# Patient Record
Sex: MALE | Race: WHITE | Employment: FULL TIME | URBAN - METROPOLITAN AREA
[De-identification: names, ages, dates, MRNs, and addresses within clinical notes are randomized per-mention and may not be internally consistent; named-entity substitution may affect disease eponyms.]

---

## 2017-01-31 ENCOUNTER — OFFICE VISIT (OUTPATIENT)
Dept: ENT CLINIC | Age: 67
End: 2017-01-31

## 2017-01-31 VITALS — DIASTOLIC BLOOD PRESSURE: 87 MMHG | SYSTOLIC BLOOD PRESSURE: 137 MMHG | TEMPERATURE: 98.2 F | HEIGHT: 68 IN

## 2017-01-31 DIAGNOSIS — K14.6 TONGUE PAIN: Primary | ICD-10-CM

## 2017-01-31 PROCEDURE — 99212 OFFICE O/P EST SF 10 MIN: CPT | Performed by: OTOLARYNGOLOGY

## 2017-03-09 ENCOUNTER — OFFICE VISIT (OUTPATIENT)
Dept: ENT CLINIC | Age: 67
End: 2017-03-09

## 2017-03-09 VITALS — TEMPERATURE: 98.1 F | HEIGHT: 68 IN | SYSTOLIC BLOOD PRESSURE: 139 MMHG | DIASTOLIC BLOOD PRESSURE: 79 MMHG

## 2017-03-09 DIAGNOSIS — K14.6 TONGUE PAIN: Primary | ICD-10-CM

## 2017-03-09 PROCEDURE — 99212 OFFICE O/P EST SF 10 MIN: CPT | Performed by: OTOLARYNGOLOGY

## 2017-07-18 ENCOUNTER — OFFICE VISIT (OUTPATIENT)
Dept: ENT CLINIC | Age: 67
End: 2017-07-18

## 2017-07-18 VITALS
SYSTOLIC BLOOD PRESSURE: 138 MMHG | BODY MASS INDEX: 30.01 KG/M2 | TEMPERATURE: 97.7 F | DIASTOLIC BLOOD PRESSURE: 85 MMHG | HEIGHT: 68 IN | WEIGHT: 198 LBS

## 2017-07-18 DIAGNOSIS — K14.6 TONGUE PAIN: Primary | ICD-10-CM

## 2017-07-18 DIAGNOSIS — Q38.6: ICD-10-CM

## 2017-07-18 PROCEDURE — 99212 OFFICE O/P EST SF 10 MIN: CPT | Performed by: OTOLARYNGOLOGY

## 2017-11-09 ENCOUNTER — TELEPHONE (OUTPATIENT)
Dept: ENT CLINIC | Age: 67
End: 2017-11-09

## 2017-11-13 ENCOUNTER — OFFICE VISIT (OUTPATIENT)
Dept: INTERNAL MEDICINE | Age: 67
End: 2017-11-13

## 2017-11-13 VITALS
DIASTOLIC BLOOD PRESSURE: 92 MMHG | SYSTOLIC BLOOD PRESSURE: 142 MMHG | OXYGEN SATURATION: 97 % | BODY MASS INDEX: 30.32 KG/M2 | RESPIRATION RATE: 16 BRPM | WEIGHT: 199.4 LBS | HEART RATE: 86 BPM

## 2017-11-13 DIAGNOSIS — R73.9 HYPERGLYCEMIA: ICD-10-CM

## 2017-11-13 DIAGNOSIS — E03.4 HYPOTHYROIDISM DUE TO ACQUIRED ATROPHY OF THYROID: ICD-10-CM

## 2017-11-13 DIAGNOSIS — Z12.5 SCREENING PSA (PROSTATE SPECIFIC ANTIGEN): ICD-10-CM

## 2017-11-13 DIAGNOSIS — R00.2 HEART PALPITATIONS: ICD-10-CM

## 2017-11-13 DIAGNOSIS — Z00.00 WELL ADULT EXAM: Primary | ICD-10-CM

## 2017-11-13 DIAGNOSIS — Z00.00 WELL ADULT EXAM: ICD-10-CM

## 2017-11-13 DIAGNOSIS — Z12.11 SCREEN FOR COLON CANCER: ICD-10-CM

## 2017-11-13 DIAGNOSIS — E78.2 MIXED HYPERLIPIDEMIA: ICD-10-CM

## 2017-11-13 DIAGNOSIS — E55.9 VITAMIN D DEFICIENCY: ICD-10-CM

## 2017-11-13 LAB
A/G RATIO: 2 (ref 1.1–2.2)
ALBUMIN SERPL-MCNC: 4.7 G/DL (ref 3.4–5)
ALP BLD-CCNC: 74 U/L (ref 40–129)
ALT SERPL-CCNC: 22 U/L (ref 10–40)
ANION GAP SERPL CALCULATED.3IONS-SCNC: 16 MMOL/L (ref 3–16)
AST SERPL-CCNC: 21 U/L (ref 15–37)
BASOPHILS ABSOLUTE: 0.1 K/UL (ref 0–0.2)
BASOPHILS RELATIVE PERCENT: 0.8 %
BILIRUB SERPL-MCNC: 0.6 MG/DL (ref 0–1)
BUN BLDV-MCNC: 19 MG/DL (ref 7–20)
CALCIUM SERPL-MCNC: 9 MG/DL (ref 8.3–10.6)
CHLORIDE BLD-SCNC: 104 MMOL/L (ref 99–110)
CHOLESTEROL, TOTAL: 170 MG/DL (ref 0–199)
CO2: 24 MMOL/L (ref 21–32)
CREAT SERPL-MCNC: 0.8 MG/DL (ref 0.8–1.3)
EOSINOPHILS ABSOLUTE: 0 K/UL (ref 0–0.6)
EOSINOPHILS RELATIVE PERCENT: 0.3 %
GFR AFRICAN AMERICAN: >60
GFR NON-AFRICAN AMERICAN: >60
GLOBULIN: 2.3 G/DL
GLUCOSE BLD-MCNC: 88 MG/DL (ref 70–99)
HCT VFR BLD CALC: 45.6 % (ref 40.5–52.5)
HDLC SERPL-MCNC: 49 MG/DL (ref 40–60)
HEMOGLOBIN: 15.2 G/DL (ref 13.5–17.5)
LDL CHOLESTEROL CALCULATED: 95 MG/DL
LYMPHOCYTES ABSOLUTE: 1.7 K/UL (ref 1–5.1)
LYMPHOCYTES RELATIVE PERCENT: 21.8 %
MCH RBC QN AUTO: 32.7 PG (ref 26–34)
MCHC RBC AUTO-ENTMCNC: 33.3 G/DL (ref 31–36)
MCV RBC AUTO: 98.2 FL (ref 80–100)
MONOCYTES ABSOLUTE: 0.7 K/UL (ref 0–1.3)
MONOCYTES RELATIVE PERCENT: 9.2 %
NEUTROPHILS ABSOLUTE: 5.2 K/UL (ref 1.7–7.7)
NEUTROPHILS RELATIVE PERCENT: 67.9 %
PDW BLD-RTO: 14.2 % (ref 12.4–15.4)
PLATELET # BLD: 169 K/UL (ref 135–450)
PMV BLD AUTO: 8.3 FL (ref 5–10.5)
POTASSIUM SERPL-SCNC: 4.5 MMOL/L (ref 3.5–5.1)
PROSTATE SPECIFIC ANTIGEN: 2.72 NG/ML (ref 0–4)
RBC # BLD: 4.65 M/UL (ref 4.2–5.9)
SODIUM BLD-SCNC: 144 MMOL/L (ref 136–145)
TOTAL PROTEIN: 7 G/DL (ref 6.4–8.2)
TRIGL SERPL-MCNC: 131 MG/DL (ref 0–150)
VITAMIN D 25-HYDROXY: 22.5 NG/ML
VLDLC SERPL CALC-MCNC: 26 MG/DL
WBC # BLD: 7.7 K/UL (ref 4–11)

## 2017-11-13 PROCEDURE — 99397 PER PM REEVAL EST PAT 65+ YR: CPT | Performed by: INTERNAL MEDICINE

## 2017-11-13 PROCEDURE — 93000 ELECTROCARDIOGRAM COMPLETE: CPT | Performed by: INTERNAL MEDICINE

## 2017-11-13 ASSESSMENT — ENCOUNTER SYMPTOMS
BACK PAIN: 0
NAUSEA: 0
EYE REDNESS: 0
SHORTNESS OF BREATH: 0
CHEST TIGHTNESS: 0
ABDOMINAL PAIN: 0

## 2017-11-13 NOTE — PROGRESS NOTES
and nausea. Endocrine: Negative for cold intolerance, heat intolerance, polydipsia and polyuria. Genitourinary: Negative for dysuria and hematuria. Musculoskeletal: Positive for arthralgias and myalgias. Negative for back pain and neck pain. Skin: Negative for rash and wound. Allergic/Immunologic: Negative for environmental allergies. Neurological: Negative for dizziness and headaches. Hematological: Negative for adenopathy. Does not bruise/bleed easily. Psychiatric/Behavioral: Negative for agitation. The patient is not nervous/anxious. Objective:   Physical Exam   Constitutional: He appears well-developed and well-nourished. HENT:   Right Ear: External ear normal.   Left Ear: External ear normal.   Mouth/Throat: Oropharynx is clear and moist. No oropharyngeal exudate. Eyes: Conjunctivae are normal.   Neck: Normal range of motion. Cardiovascular: Normal rate and regular rhythm. Pulmonary/Chest: Effort normal and breath sounds normal.   Abdominal: Soft. Bowel sounds are normal. He exhibits no distension. There is no tenderness. There is no rebound. Genitourinary: Prostate normal.   Genitourinary Comments: Small pea sized rectal canal growth. Musculoskeletal: Normal range of motion. He exhibits edema. Lymphadenopathy:     He has no cervical adenopathy. Neurological: He is alert. Psychiatric: He has a normal mood and affect. Assessment and plan       1. Well adult exam  Stable. See orders. - EKG 12 Lead  - Lipid Panel; Future  - Comprehensive Metabolic Panel; Future  - CBC WITH AUTO DIFFERENTIAL; Future  - Hemoglobin A1C; Future    2. Hypothyroidism due to acquired atrophy of thyroid  Stable. On replacement check labs. 3. Vitamin D deficiency  Stable. Check labs. - Vitamin D 25 Hydroxy    4. Heart palpitations  Stable. Pvcs on ekg. 5. Mixed hyperlipidemia  Stable. Check labs. 6. Hyperglycemia  Stable. Check labs. - Hemoglobin A1C; Future    7.  Screening

## 2017-11-14 LAB
ESTIMATED AVERAGE GLUCOSE: 122.6 MG/DL
HBA1C MFR BLD: 5.9 %

## 2017-11-29 ENCOUNTER — OFFICE VISIT (OUTPATIENT)
Dept: ENT CLINIC | Age: 67
End: 2017-11-29

## 2017-11-29 VITALS
HEART RATE: 102 BPM | HEIGHT: 68 IN | SYSTOLIC BLOOD PRESSURE: 134 MMHG | TEMPERATURE: 98.4 F | WEIGHT: 194.4 LBS | BODY MASS INDEX: 29.46 KG/M2 | DIASTOLIC BLOOD PRESSURE: 84 MMHG

## 2017-11-29 DIAGNOSIS — K14.8 LESION OF TONGUE: Primary | ICD-10-CM

## 2017-11-29 PROCEDURE — 99212 OFFICE O/P EST SF 10 MIN: CPT | Performed by: OTOLARYNGOLOGY

## 2017-11-29 NOTE — PROGRESS NOTES
FOLLOW UP VISIT:      INTERIM HISTORY: Lesion of left oral tongue which patient noticed 2 weeks ago. Painless. Not growing. Does not bleed. Patient has never smoked. PAST MEDICAL HISTORY:   History   Smoking Status    Never Smoker   Smokeless Tobacco    Never Used                                                    History   Alcohol Use No                                                    Current Outpatient Prescriptions:     levothyroxine (SYNTHROID) 50 MCG tablet, TAKE 1 TABLET EVERY DAY, Disp: 90 tablet, Rfl: 0    vitamin D 1000 UNITS CAPS, Take 2,000 Units by mouth, Disp: , Rfl:                                                  Past Medical History:   Diagnosis Date    Hyperlipidemia     Scalp lesion 2017    squamous cell Ca    Thyroid disease                                                     Past Surgical History:   Procedure Laterality Date    COLONOSCOPY  4/4/2005    MOHS SURGERY  2016    Dr Leah Bal, scalp lesion , squamous cell cancer          REVIEW OF SYSTEMS:  Al pertinent positive and negative findings included in HPI. Otherwise, all other systems are reviewed and are negative    PHYSICAL EXAMINATION:   GENERAL: wdwn- no acute distress  COMMUNICATION :  Normal voice  MENTAL STATUS:  Normal  HEAD AND FACE:  Normal  EXTERNAL EARS AND NOSE:  Normal  FACIAL MUSCLES:  Normal  LIPS, TEETH, GINGIVA:  Normal mucosa  PHARYNX:  1 cm smooth round firm lesion of the lateral oral tongue. White. Raised not tender. IMPRESSION: Lesion of oral tongue. PLAN: Patient has concerned about squamous cell carcinoma I have offered him excision although I think the possibility of this being malignant is remote. He prefers to watch it right now.       FOLLOW-UP: prn

## 2018-01-22 RX ORDER — LEVOTHYROXINE SODIUM 0.05 MG/1
TABLET ORAL
Qty: 90 TABLET | Refills: 0 | Status: SHIPPED | OUTPATIENT
Start: 2018-01-22 | End: 2018-04-17 | Stop reason: SDUPTHER

## 2018-02-23 ENCOUNTER — PROCEDURE VISIT (OUTPATIENT)
Dept: ENT CLINIC | Age: 68
End: 2018-02-23

## 2018-02-23 VITALS
WEIGHT: 198.8 LBS | DIASTOLIC BLOOD PRESSURE: 84 MMHG | HEIGHT: 68 IN | SYSTOLIC BLOOD PRESSURE: 139 MMHG | TEMPERATURE: 97.8 F | HEART RATE: 75 BPM | BODY MASS INDEX: 30.13 KG/M2

## 2018-02-23 DIAGNOSIS — K14.8 LESION OF TONGUE: Primary | ICD-10-CM

## 2018-02-23 PROCEDURE — 41110 EXCISION OF TONGUE LESION: CPT | Performed by: OTOLARYNGOLOGY

## 2018-02-23 NOTE — PROGRESS NOTES
OPERATIVE NOTE    PREOPERATIVE DIAGNOSIS:  Lesion left oral tongue, growing. POSTOPERATIVE DIAGNOSIS:  Same      PROCEDURE:  Excise lesion left oral tongue. SURGEON:  Joe [place]    ANAESTHESIA:  Lidocaine 1% with epinephrine 1:100,000. FINDINGS:  Lesion left lateral oral tongue topically anesthetized with lidocaine and then injected with local anesthetic. Lesion grasped with cup forceps and excised with the scissors. Silver nitrate cautery to base. Specimen sent for permanent pathologic section. FOLLOW UP:  One week.

## 2018-03-01 ENCOUNTER — OFFICE VISIT (OUTPATIENT)
Dept: ENT CLINIC | Age: 68
End: 2018-03-01

## 2018-03-01 DIAGNOSIS — K14.8 LESION OF TONGUE: Primary | ICD-10-CM

## 2018-03-01 PROCEDURE — 99024 POSTOP FOLLOW-UP VISIT: CPT | Performed by: OTOLARYNGOLOGY

## 2018-03-01 NOTE — PROGRESS NOTES
6 days following excision lesion oral tongue. Pathology: Condyloma, benign. Excision site is healing well. No pain. Follow-up: As needed.

## 2018-04-18 RX ORDER — LEVOTHYROXINE SODIUM 0.05 MG/1
TABLET ORAL
Qty: 90 TABLET | Refills: 0 | Status: SHIPPED | OUTPATIENT
Start: 2018-04-18 | End: 2018-07-18 | Stop reason: SDUPTHER

## 2018-05-17 ENCOUNTER — OFFICE VISIT (OUTPATIENT)
Dept: ENT CLINIC | Age: 68
End: 2018-05-17

## 2018-05-17 VITALS
HEIGHT: 68 IN | TEMPERATURE: 97.1 F | BODY MASS INDEX: 30.49 KG/M2 | DIASTOLIC BLOOD PRESSURE: 84 MMHG | WEIGHT: 201.2 LBS | SYSTOLIC BLOOD PRESSURE: 136 MMHG | HEART RATE: 75 BPM

## 2018-05-17 DIAGNOSIS — K14.8 LESION OF TONGUE: Primary | ICD-10-CM

## 2018-05-17 PROCEDURE — 99999 PR OFFICE/OUTPT VISIT,PROCEDURE ONLY: CPT | Performed by: OTOLARYNGOLOGY

## 2018-05-17 PROCEDURE — 41110 EXCISION OF TONGUE LESION: CPT | Performed by: OTOLARYNGOLOGY

## 2018-09-24 ENCOUNTER — OFFICE VISIT (OUTPATIENT)
Dept: ENT CLINIC | Age: 68
End: 2018-09-24
Payer: COMMERCIAL

## 2018-09-24 VITALS
DIASTOLIC BLOOD PRESSURE: 90 MMHG | BODY MASS INDEX: 30.4 KG/M2 | HEART RATE: 74 BPM | SYSTOLIC BLOOD PRESSURE: 147 MMHG | WEIGHT: 200.6 LBS | HEIGHT: 68 IN

## 2018-09-24 DIAGNOSIS — K13.79 LESION OF SOFT PALATE: Primary | ICD-10-CM

## 2018-09-24 PROCEDURE — 42800 BIOPSY OF THROAT: CPT | Performed by: OTOLARYNGOLOGY

## 2018-09-24 ASSESSMENT — ENCOUNTER SYMPTOMS
SINUS PRESSURE: 0
EYE REDNESS: 0
CHOKING: 0
EYE PAIN: 0
SHORTNESS OF BREATH: 0
COUGH: 0
NAUSEA: 0
SORE THROAT: 0
SINUS PAIN: 0
STRIDOR: 0
PHOTOPHOBIA: 0
TROUBLE SWALLOWING: 0
VOICE CHANGE: 0
EYE ITCHING: 0
FACIAL SWELLING: 0
DIARRHEA: 0
RHINORRHEA: 0

## 2018-09-24 NOTE — PROGRESS NOTES
[facility]  [address] [address] [facility], [address] [place] [address] [place]  P: [phone]  F: [phone]       Patient     Aniya West  1950    Chief Complaint     Chief Complaint   Patient presents with    Mouth Lesions     Pt is here today due to lesions that popped up Sat suddenly, not painful. Getting bigger. Pt is on doxy. 100mg qd for blepharitis       History of Present Illness     Zara Knott is a pleasant 70year old male who presents for new onset oropharyngeal lesion. He noticed a bump and whitish patch on his soft palate a few days ago after eating. Denies pain, otalgia, dysphagia, bleeding from the area. No history of alcohol or tobacco use. Denies neck masses. Denies recent weight loss. He has had tonsillectomy in the past. 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 No other complaints today.     Past Medical History     Past Medical History:   Diagnosis Date    Hyperlipidemia     Scalp lesion 2017    squamous cell Ca    Thyroid disease        Past Surgical History     Past Surgical History:   Procedure Laterality Date    COLONOSCOPY  4/4/2005   Amrik Rivera MOHS SURGERY  2016    Dr Rohan Hills, scalp lesion , squamous cell cancer        Family History     Family History   Problem Relation Age of Onset    No Known Problems Mother     No Known Problems Father        Social History     Social History     Social History    Marital status: Single     Spouse name: N/A    Number of children: N/A    Years of education: N/A     Occupational History           Social History Main Topics    Smoking status: Never Smoker    Smokeless tobacco: Never Used    Alcohol use No    Drug use: No    Sexual activity: Not on file     Other Topics Concern    Not on file     Social History Narrative    No narrative on file       Allergies     No Known Allergies    Medications     Current Outpatient Prescriptions   Medication Sig

## 2018-09-24 NOTE — PATIENT INSTRUCTIONS
Call the office with any excessive bleeding from the biopsy site. You may take tylenol or ibuprofen for pain. Call with any other questions or concerns.

## 2018-10-12 ENCOUNTER — OFFICE VISIT (OUTPATIENT)
Dept: ENT CLINIC | Age: 68
End: 2018-10-12
Payer: COMMERCIAL

## 2018-10-12 VITALS
HEART RATE: 73 BPM | SYSTOLIC BLOOD PRESSURE: 143 MMHG | DIASTOLIC BLOOD PRESSURE: 86 MMHG | HEIGHT: 70 IN | WEIGHT: 203 LBS | BODY MASS INDEX: 29.06 KG/M2

## 2018-10-12 DIAGNOSIS — H60.8X3 CHRONIC ECZEMATOUS OTITIS EXTERNA OF BOTH EARS: ICD-10-CM

## 2018-10-12 DIAGNOSIS — K13.79 LESION OF SOFT PALATE: Primary | ICD-10-CM

## 2018-10-12 PROCEDURE — 99213 OFFICE O/P EST LOW 20 MIN: CPT | Performed by: OTOLARYNGOLOGY

## 2018-10-12 RX ORDER — FLUOCINOLONE ACETONIDE 0.11 MG/ML
4 OIL AURICULAR (OTIC) 2 TIMES DAILY
Qty: 1 BOTTLE | Refills: 2 | Status: SHIPPED | OUTPATIENT
Start: 2018-10-12 | End: 2019-10-18

## 2018-10-12 ASSESSMENT — ENCOUNTER SYMPTOMS
PHOTOPHOBIA: 0
TROUBLE SWALLOWING: 0
SINUS PAIN: 0
COUGH: 0
STRIDOR: 0
SORE THROAT: 0
FACIAL SWELLING: 0
RHINORRHEA: 0
CHOKING: 0
EYE PAIN: 0
VOICE CHANGE: 0
NAUSEA: 0
SINUS PRESSURE: 0
COLOR CHANGE: 0
SHORTNESS OF BREATH: 0
EYE ITCHING: 0
DIARRHEA: 0
EYE REDNESS: 0

## 2018-10-12 NOTE — PROGRESS NOTES
[facility]  [address] [facility], [address]  P: [phone]  F: [phone]       Patient     Nakia Check  1950    Chief Complaint     Chief Complaint   Patient presents with    Follow-up     tongues lesion, pt is doing well       History of Present Illness     Nathalie is a 59-year-old male here for 2 week follow-up for oral biopsy lesion. Pathology showed benign salivary tissue with inflammation. She complains of no pain in the mouth at this time. He does endorse some bilateral ear canal itching for which she is use alcohol and vinegar drops in the past.  However the itching continues to return.     Past Medical History     Past Medical History:   Diagnosis Date    Hyperlipidemia     Scalp lesion 2017    squamous cell Ca    Thyroid disease        Past Surgical History     Past Surgical History:   Procedure Laterality Date    COLONOSCOPY  4/4/2005    MOHS SURGERY  2016    Dr Ryan Carrillo, scalp lesion , squamous cell cancer        Family History     Family History   Problem Relation Age of Onset    No Known Problems Mother     No Known Problems Father        Social History     Social History     Social History    Marital status: Single     Spouse name: N/A    Number of children: N/A    Years of education: N/A     Occupational History           Social History Main Topics    Smoking status: Never Smoker    Smokeless tobacco: Never Used    Alcohol use No    Drug use: No    Sexual activity: Not on file     Other Topics Concern    Not on file     Social History Narrative    No narrative on file       Allergies     No Known Allergies    Medications     Current Outpatient Prescriptions   Medication Sig Dispense Refill    fluocinolone (DERMOTIC) 0.01 % OIL oil Place 4 drops in ear(s) 2 times daily 1 Bottle 2    levothyroxine (SYNTHROID) 50 MCG tablet TAKE 1 TABLET EVERY DAY 90 tablet 1    lidocaine viscous (XYLOCAINE) 2 % solution Take 5ml by

## 2018-10-22 ENCOUNTER — OFFICE VISIT (OUTPATIENT)
Dept: INTERNAL MEDICINE CLINIC | Age: 68
End: 2018-10-22
Payer: COMMERCIAL

## 2018-10-22 VITALS
DIASTOLIC BLOOD PRESSURE: 84 MMHG | OXYGEN SATURATION: 98 % | BODY MASS INDEX: 30.62 KG/M2 | HEIGHT: 68 IN | HEART RATE: 83 BPM | SYSTOLIC BLOOD PRESSURE: 132 MMHG | WEIGHT: 202 LBS | RESPIRATION RATE: 16 BRPM

## 2018-10-22 DIAGNOSIS — Z00.00 WELL ADULT EXAM: Primary | ICD-10-CM

## 2018-10-22 DIAGNOSIS — E55.9 VITAMIN D DEFICIENCY: ICD-10-CM

## 2018-10-22 DIAGNOSIS — E03.4 HYPOTHYROIDISM DUE TO ACQUIRED ATROPHY OF THYROID: ICD-10-CM

## 2018-10-22 DIAGNOSIS — Z12.5 SCREENING PSA (PROSTATE SPECIFIC ANTIGEN): ICD-10-CM

## 2018-10-22 PROCEDURE — 99397 PER PM REEVAL EST PAT 65+ YR: CPT | Performed by: INTERNAL MEDICINE

## 2018-10-22 RX ORDER — DOXYCYCLINE HYCLATE 100 MG/1
100 CAPSULE ORAL DAILY
COMMUNITY
End: 2019-10-18

## 2018-10-22 ASSESSMENT — ENCOUNTER SYMPTOMS
SHORTNESS OF BREATH: 0
BACK PAIN: 0
EYE REDNESS: 0
CHEST TIGHTNESS: 0
NAUSEA: 0
ABDOMINAL PAIN: 0

## 2018-10-22 ASSESSMENT — PATIENT HEALTH QUESTIONNAIRE - PHQ9
1. LITTLE INTEREST OR PLEASURE IN DOING THINGS: 0
SUM OF ALL RESPONSES TO PHQ QUESTIONS 1-9: 0
SUM OF ALL RESPONSES TO PHQ9 QUESTIONS 1 & 2: 0
2. FEELING DOWN, DEPRESSED OR HOPELESS: 0
SUM OF ALL RESPONSES TO PHQ QUESTIONS 1-9: 0

## 2018-12-06 DIAGNOSIS — Z13.1 DIABETES MELLITUS SCREENING: Primary | ICD-10-CM

## 2018-12-07 DIAGNOSIS — Z13.1 DIABETES MELLITUS SCREENING: ICD-10-CM

## 2018-12-08 LAB
ESTIMATED AVERAGE GLUCOSE: 119.8 MG/DL
HBA1C MFR BLD: 5.8 %

## 2018-12-17 RX ORDER — DOXYCYCLINE 100 MG/1
CAPSULE ORAL
Qty: 20 CAPSULE | Refills: 0 | Status: SHIPPED | OUTPATIENT
Start: 2018-12-17 | End: 2019-10-18

## 2019-01-11 RX ORDER — LEVOTHYROXINE SODIUM 0.05 MG/1
TABLET ORAL
Qty: 90 TABLET | Refills: 1 | Status: SHIPPED | OUTPATIENT
Start: 2019-01-11 | End: 2019-07-22 | Stop reason: SDUPTHER

## 2019-07-22 RX ORDER — LEVOTHYROXINE SODIUM 0.05 MG/1
TABLET ORAL
Qty: 90 TABLET | Refills: 1 | Status: SHIPPED | OUTPATIENT
Start: 2019-07-22 | End: 2020-01-21

## 2019-09-20 ENCOUNTER — TELEPHONE (OUTPATIENT)
Dept: ENT CLINIC | Age: 69
End: 2019-09-20

## 2019-09-20 ENCOUNTER — OFFICE VISIT (OUTPATIENT)
Dept: ENT CLINIC | Age: 69
End: 2019-09-20
Payer: COMMERCIAL

## 2019-09-20 VITALS
BODY MASS INDEX: 30.77 KG/M2 | HEART RATE: 80 BPM | HEIGHT: 68 IN | WEIGHT: 203 LBS | TEMPERATURE: 97.4 F | SYSTOLIC BLOOD PRESSURE: 146 MMHG | DIASTOLIC BLOOD PRESSURE: 89 MMHG

## 2019-09-20 DIAGNOSIS — H65.91 MUCOID OTITIS MEDIA OF RIGHT EAR WITH EFFUSION: Primary | ICD-10-CM

## 2019-09-20 DIAGNOSIS — H72.91 PERFORATION OF RIGHT TYMPANIC MEMBRANE: ICD-10-CM

## 2019-09-20 DIAGNOSIS — J30.89 ALLERGIC RHINITIS DUE TO OTHER ALLERGIC TRIGGER, UNSPECIFIED SEASONALITY: ICD-10-CM

## 2019-09-20 PROCEDURE — 99213 OFFICE O/P EST LOW 20 MIN: CPT | Performed by: OTOLARYNGOLOGY

## 2019-09-20 PROCEDURE — 92504 EAR MICROSCOPY EXAMINATION: CPT | Performed by: OTOLARYNGOLOGY

## 2019-09-20 RX ORDER — OFLOXACIN 3 MG/ML
4 SOLUTION AURICULAR (OTIC) 2 TIMES DAILY
Qty: 1 BOTTLE | Refills: 0 | Status: SHIPPED | OUTPATIENT
Start: 2019-09-20 | End: 2019-09-27

## 2019-09-20 ASSESSMENT — ENCOUNTER SYMPTOMS
NAUSEA: 0
EYE REDNESS: 0
FACIAL SWELLING: 0
SINUS PAIN: 0
SHORTNESS OF BREATH: 0
SORE THROAT: 0
COUGH: 0
EYE ITCHING: 0
STRIDOR: 0
VOICE CHANGE: 0
COLOR CHANGE: 0
DIARRHEA: 0
RHINORRHEA: 1
TROUBLE SWALLOWING: 0
PHOTOPHOBIA: 0
CHOKING: 0
EYE PAIN: 0
SINUS PRESSURE: 0

## 2019-09-20 NOTE — PROGRESS NOTES
Respiratory: Negative for cough, choking, shortness of breath and stridor. Gastrointestinal: Negative for diarrhea and nausea. Musculoskeletal: Negative for neck pain and neck stiffness. Skin: Negative for color change and rash. Neurological: Negative for dizziness, facial asymmetry and light-headedness. Hematological: Negative for adenopathy. Psychiatric/Behavioral: Negative for agitation and confusion. PhysicalExam     Vitals:    09/20/19 1302   BP: (!) 146/89   Pulse: 80   Temp: 97.4 °F (36.3 °C)       Physical Exam   Constitutional: He is oriented to person, place, and time. He appears well-developed and well-nourished. HENT:   Head: Normocephalic and atraumatic. Right Ear: External ear and ear canal normal. No drainage. Tympanic membrane is perforated. A middle ear effusion is present. Left Ear: Tympanic membrane, external ear and ear canal normal. No drainage. Tympanic membrane is not perforated. No middle ear effusion. Nose: No mucosal edema, rhinorrhea or septal deviation. No epistaxis. Mouth/Throat: Uvula is midline, oropharynx is clear and moist and mucous membranes are normal. No trismus in the jaw. Normal dentition. No oropharyngeal exudate. Eyes: Pupils are equal, round, and reactive to light. EOM are normal. Right eye exhibits no discharge. Left eye exhibits no discharge. No scleral icterus. Neck: Phonation normal. Neck supple. No tracheal deviation present. No thyromegaly present. Pulmonary/Chest: Effort normal. No stridor. No respiratory distress. Lymphadenopathy:     He has no cervical adenopathy. Neurological: He is alert and oriented to person, place, and time. No cranial nerve deficit. Skin: Skin is warm and dry. Psychiatric: He has a normal mood and affect. His behavior is normal.         Procedure     Otomicroscopy    An operating microscope was utilized to visualize the external auditory canals using a 4mm speculum.   Right external auditory canal

## 2019-10-18 ENCOUNTER — OFFICE VISIT (OUTPATIENT)
Dept: ENT CLINIC | Age: 69
End: 2019-10-18
Payer: COMMERCIAL

## 2019-10-18 VITALS
HEART RATE: 81 BPM | DIASTOLIC BLOOD PRESSURE: 90 MMHG | HEIGHT: 68 IN | TEMPERATURE: 97.4 F | SYSTOLIC BLOOD PRESSURE: 142 MMHG | WEIGHT: 205 LBS | BODY MASS INDEX: 31.07 KG/M2

## 2019-10-18 DIAGNOSIS — H65.91 MUCOID OTITIS MEDIA OF RIGHT EAR WITH EFFUSION: Primary | ICD-10-CM

## 2019-10-18 DIAGNOSIS — H72.91 PERFORATION OF RIGHT TYMPANIC MEMBRANE: ICD-10-CM

## 2019-10-18 PROCEDURE — 92504 EAR MICROSCOPY EXAMINATION: CPT | Performed by: OTOLARYNGOLOGY

## 2019-10-18 PROCEDURE — 99213 OFFICE O/P EST LOW 20 MIN: CPT | Performed by: OTOLARYNGOLOGY

## 2019-10-18 ASSESSMENT — ENCOUNTER SYMPTOMS
EYE ITCHING: 0
RHINORRHEA: 0
DIARRHEA: 0
EYE REDNESS: 0
EYE PAIN: 0
TROUBLE SWALLOWING: 0
SINUS PAIN: 0
VOICE CHANGE: 0
NAUSEA: 0
CHOKING: 0
SORE THROAT: 0
SINUS PRESSURE: 0
STRIDOR: 0
FACIAL SWELLING: 0
PHOTOPHOBIA: 0
COLOR CHANGE: 0
SHORTNESS OF BREATH: 0
COUGH: 0

## 2019-11-06 ENCOUNTER — OFFICE VISIT (OUTPATIENT)
Dept: INTERNAL MEDICINE CLINIC | Age: 69
End: 2019-11-06
Payer: COMMERCIAL

## 2019-11-06 VITALS
SYSTOLIC BLOOD PRESSURE: 136 MMHG | HEIGHT: 68 IN | RESPIRATION RATE: 16 BRPM | DIASTOLIC BLOOD PRESSURE: 80 MMHG | OXYGEN SATURATION: 97 % | HEART RATE: 81 BPM | BODY MASS INDEX: 31.52 KG/M2 | WEIGHT: 208 LBS

## 2019-11-06 DIAGNOSIS — Z12.5 SCREENING PSA (PROSTATE SPECIFIC ANTIGEN): ICD-10-CM

## 2019-11-06 DIAGNOSIS — Z00.00 WELL ADULT EXAM: ICD-10-CM

## 2019-11-06 DIAGNOSIS — E03.4 HYPOTHYROIDISM DUE TO ACQUIRED ATROPHY OF THYROID: ICD-10-CM

## 2019-11-06 DIAGNOSIS — Z00.00 WELL ADULT EXAM: Primary | ICD-10-CM

## 2019-11-06 DIAGNOSIS — E55.9 VITAMIN D DEFICIENCY: ICD-10-CM

## 2019-11-06 LAB
A/G RATIO: 2.3 (ref 1.1–2.2)
ALBUMIN SERPL-MCNC: 4.9 G/DL (ref 3.4–5)
ALP BLD-CCNC: 94 U/L (ref 40–129)
ALT SERPL-CCNC: 29 U/L (ref 10–40)
ANION GAP SERPL CALCULATED.3IONS-SCNC: 16 MMOL/L (ref 3–16)
AST SERPL-CCNC: 24 U/L (ref 15–37)
BASOPHILS ABSOLUTE: 0 K/UL (ref 0–0.2)
BASOPHILS RELATIVE PERCENT: 0.7 %
BILIRUB SERPL-MCNC: 0.3 MG/DL (ref 0–1)
BUN BLDV-MCNC: 17 MG/DL (ref 7–20)
CALCIUM SERPL-MCNC: 9 MG/DL (ref 8.3–10.6)
CHLORIDE BLD-SCNC: 107 MMOL/L (ref 99–110)
CHOLESTEROL, TOTAL: 169 MG/DL (ref 0–199)
CO2: 21 MMOL/L (ref 21–32)
CREAT SERPL-MCNC: 0.7 MG/DL (ref 0.8–1.3)
EOSINOPHILS ABSOLUTE: 0 K/UL (ref 0–0.6)
EOSINOPHILS RELATIVE PERCENT: 0.7 %
GFR AFRICAN AMERICAN: >60
GFR NON-AFRICAN AMERICAN: >60
GLOBULIN: 2.1 G/DL
GLUCOSE BLD-MCNC: 88 MG/DL (ref 70–99)
HCT VFR BLD CALC: 43.8 % (ref 40.5–52.5)
HDLC SERPL-MCNC: 60 MG/DL (ref 40–60)
HEMOGLOBIN: 15 G/DL (ref 13.5–17.5)
LDL CHOLESTEROL CALCULATED: 88 MG/DL
LYMPHOCYTES ABSOLUTE: 2.3 K/UL (ref 1–5.1)
LYMPHOCYTES RELATIVE PERCENT: 35.1 %
MCH RBC QN AUTO: 33.3 PG (ref 26–34)
MCHC RBC AUTO-ENTMCNC: 34.2 G/DL (ref 31–36)
MCV RBC AUTO: 97.4 FL (ref 80–100)
MONOCYTES ABSOLUTE: 0.6 K/UL (ref 0–1.3)
MONOCYTES RELATIVE PERCENT: 9.1 %
NEUTROPHILS ABSOLUTE: 3.6 K/UL (ref 1.7–7.7)
NEUTROPHILS RELATIVE PERCENT: 54.4 %
PDW BLD-RTO: 14.2 % (ref 12.4–15.4)
PLATELET # BLD: 149 K/UL (ref 135–450)
PMV BLD AUTO: 8.2 FL (ref 5–10.5)
POTASSIUM SERPL-SCNC: 4 MMOL/L (ref 3.5–5.1)
PROSTATE SPECIFIC ANTIGEN: 2.51 NG/ML (ref 0–4)
RBC # BLD: 4.5 M/UL (ref 4.2–5.9)
SODIUM BLD-SCNC: 144 MMOL/L (ref 136–145)
T4 FREE: 0.9 NG/DL (ref 0.9–1.8)
TOTAL PROTEIN: 7 G/DL (ref 6.4–8.2)
TRIGL SERPL-MCNC: 106 MG/DL (ref 0–150)
TSH SERPL DL<=0.05 MIU/L-ACNC: 2.67 UIU/ML (ref 0.27–4.2)
VITAMIN D 25-HYDROXY: 36.9 NG/ML
VLDLC SERPL CALC-MCNC: 21 MG/DL
WBC # BLD: 6.5 K/UL (ref 4–11)

## 2019-11-06 PROCEDURE — 99397 PER PM REEVAL EST PAT 65+ YR: CPT | Performed by: INTERNAL MEDICINE

## 2019-11-06 ASSESSMENT — PATIENT HEALTH QUESTIONNAIRE - PHQ9
SUM OF ALL RESPONSES TO PHQ9 QUESTIONS 1 & 2: 0
1. LITTLE INTEREST OR PLEASURE IN DOING THINGS: 0
SUM OF ALL RESPONSES TO PHQ QUESTIONS 1-9: 0
2. FEELING DOWN, DEPRESSED OR HOPELESS: 0
SUM OF ALL RESPONSES TO PHQ QUESTIONS 1-9: 0

## 2019-11-06 ASSESSMENT — ENCOUNTER SYMPTOMS
NAUSEA: 0
EYE REDNESS: 0
BACK PAIN: 0
SHORTNESS OF BREATH: 0
ABDOMINAL PAIN: 0
CHEST TIGHTNESS: 0

## 2019-11-07 LAB
ESTIMATED AVERAGE GLUCOSE: 116.9 MG/DL
HBA1C MFR BLD: 5.7 %

## 2019-12-20 ENCOUNTER — OFFICE VISIT (OUTPATIENT)
Dept: ENT CLINIC | Age: 69
End: 2019-12-20
Payer: COMMERCIAL

## 2019-12-20 VITALS
DIASTOLIC BLOOD PRESSURE: 85 MMHG | TEMPERATURE: 97.5 F | WEIGHT: 205 LBS | HEIGHT: 68 IN | HEART RATE: 79 BPM | BODY MASS INDEX: 31.07 KG/M2 | SYSTOLIC BLOOD PRESSURE: 147 MMHG

## 2019-12-20 DIAGNOSIS — H65.91 MUCOID OTITIS MEDIA OF RIGHT EAR WITH EFFUSION: Primary | ICD-10-CM

## 2019-12-20 DIAGNOSIS — K13.21 LEUKOPLAKIA, TONGUE: ICD-10-CM

## 2019-12-20 PROCEDURE — 99213 OFFICE O/P EST LOW 20 MIN: CPT | Performed by: OTOLARYNGOLOGY

## 2019-12-20 ASSESSMENT — ENCOUNTER SYMPTOMS
PHOTOPHOBIA: 0
DIARRHEA: 0
SHORTNESS OF BREATH: 0
STRIDOR: 0
SINUS PRESSURE: 0
EYE PAIN: 0
CHOKING: 0
SORE THROAT: 0
EYE REDNESS: 0
EYE ITCHING: 0
COUGH: 0
TROUBLE SWALLOWING: 0
COLOR CHANGE: 0
NAUSEA: 0
FACIAL SWELLING: 0
VOICE CHANGE: 0
SINUS PAIN: 0
RHINORRHEA: 0

## 2020-01-21 RX ORDER — LEVOTHYROXINE SODIUM 0.05 MG/1
TABLET ORAL
Qty: 90 TABLET | Refills: 1 | Status: SHIPPED | OUTPATIENT
Start: 2020-01-21 | End: 2020-07-17

## 2020-02-06 ENCOUNTER — OFFICE VISIT (OUTPATIENT)
Dept: ENT CLINIC | Age: 70
End: 2020-02-06
Payer: COMMERCIAL

## 2020-02-06 VITALS
TEMPERATURE: 98.5 F | DIASTOLIC BLOOD PRESSURE: 87 MMHG | BODY MASS INDEX: 32.34 KG/M2 | SYSTOLIC BLOOD PRESSURE: 141 MMHG | HEART RATE: 91 BPM | HEIGHT: 68 IN | WEIGHT: 213.4 LBS

## 2020-02-06 PROCEDURE — 99213 OFFICE O/P EST LOW 20 MIN: CPT | Performed by: OTOLARYNGOLOGY

## 2020-02-06 NOTE — PROGRESS NOTES
FOLLOW UP VISIT:      INTERIM HISTORY: Benign lesion of the tongue excised 2 years ago. He now has an area of concern on the left side of the tongue which has been painful for several months. It has not grown. It does not bleed. It is anterior to the area where the biopsy was taken 2 years ago. PAST MEDICAL HISTORY:   Social History     Tobacco Use   Smoking Status Never Smoker   Smokeless Tobacco Never Used                                                    Social History     Substance and Sexual Activity   Alcohol Use No                                                    Current Outpatient Medications:     levothyroxine (SYNTHROID) 50 MCG tablet, TAKE 1 TABLET EVERY DAY, Disp: 90 tablet, Rfl: 1    vitamin D 1000 UNITS CAPS, Take 2,000 Units by mouth, Disp: , Rfl:                                                  Past Medical History:   Diagnosis Date    Hyperlipidemia     Scalp lesion 2017    squamous cell Ca    Thyroid disease                                                     Past Surgical History:   Procedure Laterality Date    COLONOSCOPY  4/4/2005    COLONOSCOPY  01/05/2018    small polyps 5-10 years adenomatousvs hyperplastic    MOHS SURGERY  2016    Dr Nova Shane, scalp lesion , squamous cell cancer     SINUS SURGERY      TONSILLECTOMY         FAMILY HISTORY: Family history reviewed and except as pertinent to the interim history is not contributory. REVIEW OF SYSTEMS:  All pertinent positive and negative findings included in HPI. Otherwise, all other systems are reviewed and are negative    PHYSICAL EXAMINATION:   GENERAL: wdwn- no acute distress  RESPIRATORY: No stridor. COMMUNICATION :  Normal voice  MENTAL STATUS: Alert and oriented x3  HEAD AND FACE: No skin lesions of the face. EXTERNAL EARS AND NOSE: The external ears and nasal pyramid are normal.  FACIAL MUSCLES: All branches of the facial nerve intact. FACE PALPATION: Zygomatic arches and orbital rims are intact.   No tenderness over the sinuses. EXTRAOCULAR MUSCLES: Intact with full range of motion. OTOSCOPY:  Normal tympanic membranes, middle ear spaces, and external auditory canals. TUNING FORKS:  Rinne ++ Landers midline at 512 Hz  INTRANASAL:  Septum midline, turbinates normal, meati clear. LIPS, TEETH, GINGIVA:  Normal mucosa  PHARYNX:  Normal.  Specifically no lesion on the tongue. Palpation of the tongue reveals no induration. NECK:  No masses. LYMPH NODES: No cervical lymphadenopathy. SALIVARY GLANDS: Parotid and submandibular glands normal.  THYROID: No goiter or thyroid nodules palpable. IMPRESSION: Tongue examination today is normal by inspection and by palpation. PLAN: Patient reassured that her oral cavity exam is normal.    FOLLOW-UP: As needed.

## 2020-05-13 ENCOUNTER — HOSPITAL ENCOUNTER (INPATIENT)
Age: 70
LOS: 2 days | Discharge: HOME | DRG: 661 | End: 2020-05-15
Attending: EMERGENCY MEDICINE | Admitting: HOSPITALIST
Payer: MEDICARE

## 2020-05-13 ENCOUNTER — APPOINTMENT (OUTPATIENT)
Dept: CT IMAGING | Age: 70
DRG: 661 | End: 2020-05-13
Payer: MEDICARE

## 2020-05-13 ENCOUNTER — TELEPHONE (OUTPATIENT)
Dept: INTERNAL MEDICINE CLINIC | Age: 70
End: 2020-05-13

## 2020-05-13 ENCOUNTER — OFFICE VISIT (OUTPATIENT)
Dept: INTERNAL MEDICINE CLINIC | Age: 70
End: 2020-05-13
Payer: COMMERCIAL

## 2020-05-13 VITALS
DIASTOLIC BLOOD PRESSURE: 90 MMHG | SYSTOLIC BLOOD PRESSURE: 168 MMHG | WEIGHT: 212 LBS | BODY MASS INDEX: 32.23 KG/M2 | TEMPERATURE: 98.1 F

## 2020-05-13 PROBLEM — N20.0 KIDNEY STONE: Status: ACTIVE | Noted: 2020-05-13

## 2020-05-13 LAB
ALBUMIN SERPL-MCNC: 4.8 G/DL (ref 3.4–5)
ALP BLD-CCNC: 80 U/L (ref 40–129)
ALT SERPL-CCNC: 30 U/L (ref 10–40)
ANION GAP SERPL CALCULATED.3IONS-SCNC: 14 MMOL/L (ref 3–16)
AST SERPL-CCNC: 28 U/L (ref 15–37)
BACTERIA: ABNORMAL /HPF
BASOPHILS ABSOLUTE: 0 K/UL (ref 0–0.2)
BASOPHILS RELATIVE PERCENT: 0.3 %
BILIRUB SERPL-MCNC: 0.8 MG/DL (ref 0–1)
BILIRUBIN DIRECT: <0.2 MG/DL (ref 0–0.3)
BILIRUBIN URINE: NEGATIVE
BILIRUBIN, INDIRECT: NORMAL MG/DL (ref 0–1)
BLOOD, URINE: ABNORMAL
BUN BLDV-MCNC: 21 MG/DL (ref 7–20)
CALCIUM SERPL-MCNC: 9 MG/DL (ref 8.3–10.6)
CHLORIDE BLD-SCNC: 99 MMOL/L (ref 99–110)
CLARITY: CLEAR
CO2: 22 MMOL/L (ref 21–32)
COLOR: YELLOW
CREAT SERPL-MCNC: 1.2 MG/DL (ref 0.8–1.3)
EOSINOPHILS ABSOLUTE: 0 K/UL (ref 0–0.6)
EOSINOPHILS RELATIVE PERCENT: 0 %
EPITHELIAL CELLS, UA: ABNORMAL /HPF (ref 0–5)
GFR AFRICAN AMERICAN: >60
GFR NON-AFRICAN AMERICAN: 60
GLUCOSE BLD-MCNC: 144 MG/DL (ref 70–99)
GLUCOSE URINE: NEGATIVE MG/DL
HCT VFR BLD CALC: 43 % (ref 40.5–52.5)
HEMOGLOBIN: 15 G/DL (ref 13.5–17.5)
KETONES, URINE: ABNORMAL MG/DL
LEUKOCYTE ESTERASE, URINE: NEGATIVE
LIPASE: 23 U/L (ref 13–60)
LYMPHOCYTES ABSOLUTE: 1.1 K/UL (ref 1–5.1)
LYMPHOCYTES RELATIVE PERCENT: 7.6 %
MCH RBC QN AUTO: 33.6 PG (ref 26–34)
MCHC RBC AUTO-ENTMCNC: 34.9 G/DL (ref 31–36)
MCV RBC AUTO: 96.3 FL (ref 80–100)
MICROSCOPIC EXAMINATION: YES
MONOCYTES ABSOLUTE: 0.8 K/UL (ref 0–1.3)
MONOCYTES RELATIVE PERCENT: 5.5 %
NEUTROPHILS ABSOLUTE: 12.7 K/UL (ref 1.7–7.7)
NEUTROPHILS RELATIVE PERCENT: 86.6 %
NITRITE, URINE: NEGATIVE
PDW BLD-RTO: 13.9 % (ref 12.4–15.4)
PH UA: 5.5 (ref 5–8)
PLATELET # BLD: 175 K/UL (ref 135–450)
PMV BLD AUTO: 8.2 FL (ref 5–10.5)
POTASSIUM REFLEX MAGNESIUM: 3.6 MMOL/L (ref 3.5–5.1)
PROTEIN UA: ABNORMAL MG/DL
RBC # BLD: 4.46 M/UL (ref 4.2–5.9)
RBC UA: ABNORMAL /HPF (ref 0–4)
SODIUM BLD-SCNC: 135 MMOL/L (ref 136–145)
SPECIFIC GRAVITY UA: >=1.03 (ref 1–1.03)
TOTAL PROTEIN: 7.4 G/DL (ref 6.4–8.2)
URINE TYPE: ABNORMAL
UROBILINOGEN, URINE: 0.2 E.U./DL
WBC # BLD: 14.6 K/UL (ref 4–11)
WBC UA: ABNORMAL /HPF (ref 0–5)

## 2020-05-13 PROCEDURE — 87086 URINE CULTURE/COLONY COUNT: CPT

## 2020-05-13 PROCEDURE — 85025 COMPLETE CBC W/AUTO DIFF WBC: CPT

## 2020-05-13 PROCEDURE — 2580000003 HC RX 258: Performed by: STUDENT IN AN ORGANIZED HEALTH CARE EDUCATION/TRAINING PROGRAM

## 2020-05-13 PROCEDURE — 80076 HEPATIC FUNCTION PANEL: CPT

## 2020-05-13 PROCEDURE — 80048 BASIC METABOLIC PNL TOTAL CA: CPT

## 2020-05-13 PROCEDURE — 6360000004 HC RX CONTRAST MEDICATION: Performed by: NURSE PRACTITIONER

## 2020-05-13 PROCEDURE — 99285 EMERGENCY DEPT VISIT HI MDM: CPT

## 2020-05-13 PROCEDURE — 2580000003 HC RX 258: Performed by: NURSE PRACTITIONER

## 2020-05-13 PROCEDURE — 0T768DZ DILATION OF RIGHT URETER WITH INTRALUMINAL DEVICE, VIA NATURAL OR ARTIFICIAL OPENING ENDOSCOPIC: ICD-10-PCS | Performed by: UROLOGY

## 2020-05-13 PROCEDURE — 0TC68ZZ EXTIRPATION OF MATTER FROM RIGHT URETER, VIA NATURAL OR ARTIFICIAL OPENING ENDOSCOPIC: ICD-10-PCS | Performed by: UROLOGY

## 2020-05-13 PROCEDURE — 81001 URINALYSIS AUTO W/SCOPE: CPT

## 2020-05-13 PROCEDURE — 99213 OFFICE O/P EST LOW 20 MIN: CPT | Performed by: INTERNAL MEDICINE

## 2020-05-13 PROCEDURE — 6370000000 HC RX 637 (ALT 250 FOR IP): Performed by: STUDENT IN AN ORGANIZED HEALTH CARE EDUCATION/TRAINING PROGRAM

## 2020-05-13 PROCEDURE — 74177 CT ABD & PELVIS W/CONTRAST: CPT

## 2020-05-13 PROCEDURE — 96375 TX/PRO/DX INJ NEW DRUG ADDON: CPT

## 2020-05-13 PROCEDURE — 1200000000 HC SEMI PRIVATE

## 2020-05-13 PROCEDURE — 96376 TX/PRO/DX INJ SAME DRUG ADON: CPT

## 2020-05-13 PROCEDURE — BT1D1ZZ FLUOROSCOPY OF RIGHT KIDNEY, URETER AND BLADDER USING LOW OSMOLAR CONTRAST: ICD-10-PCS | Performed by: UROLOGY

## 2020-05-13 PROCEDURE — 99222 1ST HOSP IP/OBS MODERATE 55: CPT | Performed by: HOSPITALIST

## 2020-05-13 PROCEDURE — 83690 ASSAY OF LIPASE: CPT

## 2020-05-13 PROCEDURE — 6360000002 HC RX W HCPCS: Performed by: NURSE PRACTITIONER

## 2020-05-13 PROCEDURE — 96374 THER/PROPH/DIAG INJ IV PUSH: CPT

## 2020-05-13 PROCEDURE — 6360000002 HC RX W HCPCS: Performed by: STUDENT IN AN ORGANIZED HEALTH CARE EDUCATION/TRAINING PROGRAM

## 2020-05-13 RX ORDER — 0.9 % SODIUM CHLORIDE 0.9 %
1000 INTRAVENOUS SOLUTION INTRAVENOUS ONCE
Status: COMPLETED | OUTPATIENT
Start: 2020-05-13 | End: 2020-05-13

## 2020-05-13 RX ORDER — ACETAMINOPHEN 325 MG/1
650 TABLET ORAL EVERY 6 HOURS PRN
Status: DISCONTINUED | OUTPATIENT
Start: 2020-05-13 | End: 2020-05-15 | Stop reason: HOSPADM

## 2020-05-13 RX ORDER — ONDANSETRON 2 MG/ML
4 INJECTION INTRAMUSCULAR; INTRAVENOUS EVERY 6 HOURS PRN
Status: DISCONTINUED | OUTPATIENT
Start: 2020-05-13 | End: 2020-05-15 | Stop reason: HOSPADM

## 2020-05-13 RX ORDER — NIFEDIPINE 30 MG/1
30 TABLET, FILM COATED, EXTENDED RELEASE ORAL DAILY
Status: DISCONTINUED | OUTPATIENT
Start: 2020-05-13 | End: 2020-05-15 | Stop reason: HOSPADM

## 2020-05-13 RX ORDER — KETOROLAC TROMETHAMINE 30 MG/ML
15 INJECTION, SOLUTION INTRAMUSCULAR; INTRAVENOUS ONCE
Status: COMPLETED | OUTPATIENT
Start: 2020-05-13 | End: 2020-05-13

## 2020-05-13 RX ORDER — MORPHINE SULFATE 4 MG/ML
4 INJECTION, SOLUTION INTRAMUSCULAR; INTRAVENOUS
Status: COMPLETED | OUTPATIENT
Start: 2020-05-13 | End: 2020-05-13

## 2020-05-13 RX ORDER — ACETAMINOPHEN 650 MG/1
650 SUPPOSITORY RECTAL EVERY 6 HOURS PRN
Status: DISCONTINUED | OUTPATIENT
Start: 2020-05-13 | End: 2020-05-15 | Stop reason: HOSPADM

## 2020-05-13 RX ORDER — TAMSULOSIN HYDROCHLORIDE 0.4 MG/1
0.4 CAPSULE ORAL DAILY
Status: DISCONTINUED | OUTPATIENT
Start: 2020-05-13 | End: 2020-05-15 | Stop reason: HOSPADM

## 2020-05-13 RX ORDER — SODIUM CHLORIDE 9 MG/ML
INJECTION, SOLUTION INTRAVENOUS CONTINUOUS
Status: DISCONTINUED | OUTPATIENT
Start: 2020-05-13 | End: 2020-05-14

## 2020-05-13 RX ORDER — PROMETHAZINE HYDROCHLORIDE 12.5 MG/1
12.5 TABLET ORAL EVERY 6 HOURS PRN
Status: DISCONTINUED | OUTPATIENT
Start: 2020-05-13 | End: 2020-05-15 | Stop reason: HOSPADM

## 2020-05-13 RX ORDER — SODIUM CHLORIDE 0.9 % (FLUSH) 0.9 %
10 SYRINGE (ML) INJECTION PRN
Status: DISCONTINUED | OUTPATIENT
Start: 2020-05-13 | End: 2020-05-15 | Stop reason: HOSPADM

## 2020-05-13 RX ORDER — ONDANSETRON 2 MG/ML
4 INJECTION INTRAMUSCULAR; INTRAVENOUS EVERY 30 MIN PRN
Status: DISCONTINUED | OUTPATIENT
Start: 2020-05-13 | End: 2020-05-15 | Stop reason: HOSPADM

## 2020-05-13 RX ORDER — POLYETHYLENE GLYCOL 3350 17 G/17G
17 POWDER, FOR SOLUTION ORAL DAILY PRN
Status: DISCONTINUED | OUTPATIENT
Start: 2020-05-13 | End: 2020-05-15 | Stop reason: HOSPADM

## 2020-05-13 RX ORDER — LEVOTHYROXINE SODIUM 0.05 MG/1
50 TABLET ORAL DAILY
Status: DISCONTINUED | OUTPATIENT
Start: 2020-05-14 | End: 2020-05-15 | Stop reason: HOSPADM

## 2020-05-13 RX ORDER — SODIUM CHLORIDE 0.9 % (FLUSH) 0.9 %
10 SYRINGE (ML) INJECTION EVERY 12 HOURS SCHEDULED
Status: DISCONTINUED | OUTPATIENT
Start: 2020-05-13 | End: 2020-05-15 | Stop reason: HOSPADM

## 2020-05-13 RX ADMIN — DEXTROSE MONOHYDRATE 1 G: 5 INJECTION INTRAVENOUS at 15:52

## 2020-05-13 RX ADMIN — KETOROLAC TROMETHAMINE 15 MG: 30 INJECTION, SOLUTION INTRAMUSCULAR at 11:52

## 2020-05-13 RX ADMIN — SODIUM CHLORIDE 1000 ML: 9 INJECTION, SOLUTION INTRAVENOUS at 11:52

## 2020-05-13 RX ADMIN — HYDROMORPHONE HYDROCHLORIDE 1 MG: 1 INJECTION, SOLUTION INTRAMUSCULAR; INTRAVENOUS; SUBCUTANEOUS at 12:52

## 2020-05-13 RX ADMIN — HYDROMORPHONE HYDROCHLORIDE 0.25 MG: 1 INJECTION, SOLUTION INTRAMUSCULAR; INTRAVENOUS; SUBCUTANEOUS at 23:27

## 2020-05-13 RX ADMIN — TAMSULOSIN HYDROCHLORIDE 0.4 MG: 0.4 CAPSULE ORAL at 18:01

## 2020-05-13 RX ADMIN — IOPAMIDOL 80 ML: 755 INJECTION, SOLUTION INTRAVENOUS at 12:40

## 2020-05-13 RX ADMIN — SODIUM CHLORIDE: 9 INJECTION, SOLUTION INTRAVENOUS at 17:52

## 2020-05-13 RX ADMIN — ENOXAPARIN SODIUM 40 MG: 40 INJECTION SUBCUTANEOUS at 17:56

## 2020-05-13 RX ADMIN — MORPHINE SULFATE 4 MG: 4 INJECTION INTRAVENOUS at 11:52

## 2020-05-13 RX ADMIN — ONDANSETRON 4 MG: 2 INJECTION INTRAMUSCULAR; INTRAVENOUS at 11:52

## 2020-05-13 ASSESSMENT — PAIN DESCRIPTION - PROGRESSION: CLINICAL_PROGRESSION: NOT CHANGED

## 2020-05-13 ASSESSMENT — ENCOUNTER SYMPTOMS
EYE REDNESS: 0
ABDOMINAL PAIN: 1
ABDOMINAL PAIN: 1
SHORTNESS OF BREATH: 0
BACK PAIN: 1
NAUSEA: 0
BACK PAIN: 1
NAUSEA: 0
DIARRHEA: 0
CHEST TIGHTNESS: 0
RESPIRATORY NEGATIVE: 1
VOMITING: 0

## 2020-05-13 ASSESSMENT — PAIN DESCRIPTION - LOCATION
LOCATION: ABDOMEN;BACK
LOCATION: ABDOMEN;BACK

## 2020-05-13 ASSESSMENT — PAIN DESCRIPTION - ONSET: ONSET: ON-GOING

## 2020-05-13 ASSESSMENT — PAIN DESCRIPTION - DESCRIPTORS: DESCRIPTORS: ACHING;SHARP

## 2020-05-13 ASSESSMENT — PAIN SCALES - GENERAL
PAINLEVEL_OUTOF10: 0
PAINLEVEL_OUTOF10: 2
PAINLEVEL_OUTOF10: 5
PAINLEVEL_OUTOF10: 9

## 2020-05-13 ASSESSMENT — PAIN DESCRIPTION - FREQUENCY: FREQUENCY: CONTINUOUS

## 2020-05-13 ASSESSMENT — PAIN DESCRIPTION - PAIN TYPE
TYPE: ACUTE PAIN
TYPE: ACUTE PAIN

## 2020-05-13 ASSESSMENT — PAIN DESCRIPTION - ORIENTATION: ORIENTATION: RIGHT

## 2020-05-13 ASSESSMENT — PAIN - FUNCTIONAL ASSESSMENT: PAIN_FUNCTIONAL_ASSESSMENT: PREVENTS OR INTERFERES SOME ACTIVE ACTIVITIES AND ADLS

## 2020-05-13 NOTE — H&P
Internal Medicine  PGY 1  History & Physical      CC R flank and RLQ pain    History Obtained From:  patient    HISTORY OF PRESENT ILLNESS:  Zev Meadows is a 78 y.o. male past medical history of thyroid disease, hyperlipidemia; who presents to the emergency department with a complaint of right lower quadrant abdominal cramping and right lower back pain. Patient states symptoms started on Monday, however seem to dissipate on their own Tuesday he seemed fine, and then today the pain seemed to worsen. Patient states it is constant is a cramping pain. Patient denies history of abdominal surgeries, denies known history of kidney stones however states he has a familial history. Denies recent fevers, chills, sweats, nausea vomiting or diarrhea, urinary changes or hematuria. Denies injuries or falls. Pain 9/10, no exacerbating or alleviating factors, has not tried any home medications. CT found R UPJ 5mm obstructing stone with mild/moderate hydronephrosis. Pain controlled with Dilaudid. Patient will be admitted for urology procedure in the am and pain control. Past Medical History:        Diagnosis Date    Hyperlipidemia     Kidney stone 5/13/2020    Scalp lesion 2017    squamous cell Ca    Thyroid disease    ·     Past Surgical History:        Procedure Laterality Date    COLONOSCOPY  4/4/2005    COLONOSCOPY  01/05/2018    small polyps 5-10 years adenomatousvs hyperplastic    MOHS SURGERY  2016    Dr Tan Hammonds, scalp lesion , squamous cell cancer     SINUS SURGERY      TONSILLECTOMY     ·     No current facility-administered medications on file prior to encounter. Current Outpatient Medications on File Prior to Encounter   Medication Sig Dispense Refill    levothyroxine (SYNTHROID) 50 MCG tablet TAKE 1 TABLET EVERY DAY 90 tablet 1    vitamin D 1000 UNITS CAPS Take 2,000 Units by mouth       Allergies:  Patient has no known allergies.     Social History:   · TOBACCO:   reports that he has never

## 2020-05-13 NOTE — TELEPHONE ENCOUNTER
The patient is coming in for ov today but he is asking if he can advil for servere pain, please advise

## 2020-05-13 NOTE — ED PROVIDER NOTES
[facility]  EMERGENCY DEPARTMENT ENCOUNTER          NURSE PRACTITIONER NOTE       Date of evaluation: 5/13/2020    Chief Complaint     Flank Pain and Abdominal Cramping      History of Present Illness     Fifi Healy is a 78 y.o. male past medical history of thyroid disease, hyperlipidemia; who presents to the emergency department with a complaint of right lower quadrant abdominal cramping and right lower back pain. Patient states symptoms started on Monday, however seem to dissipate on their own Tuesday he seemed fine, and then today the pain seemed to worsen. Patient states it is constant is a cramping pain, is actively guarding his right lower abdomen. During HPI patient has episodes of worsening pain with moaning. Patient was seen at his PCPs office Dr. Etta Talley earlier today, and instructed to come in for further evaluation. Patient denies history of abdominal surgeries, denies known history of kidney stones however states he has a familial history. Denies recent fevers, chills, sweats, nausea vomiting or diarrhea, urinary changes or hematuria. Denies injuries or falls. Pain 9/10, no exacerbating or alleviating factors, has not tried any home medications. Review of Systems     Review of Systems   Constitutional: Negative. Respiratory: Negative. Cardiovascular: Negative. Gastrointestinal: Positive for abdominal pain. Negative for diarrhea, nausea and vomiting. Genitourinary: Positive for flank pain. Musculoskeletal: Positive for back pain. Skin: Negative. Allergic/Immunologic: Negative for immunocompromised state. Hematological: Does not bruise/bleed easily. Psychiatric/Behavioral: Negative. Past Medical, Surgical, Family, and Social History     He has a past medical history of Hyperlipidemia, Scalp lesion, and Thyroid disease. He has a past surgical history that includes Colonoscopy (4/4/2005); Mohs surgery (2016); Colonoscopy (01/05/2018);  Tonsillectomy; and 2.  2 mm nonobstructing calculus in the left kidney. 3.  Uncomplicated mild colonic diverticulosis. 4.  Bilateral femoral head avascular necrosis; early/focal articular surface collapse in the right femoral head. 5.  Hepatic steatosis. 6.  Fat-containing inguinal and umbilical hernias.           LABS:   Results for orders placed or performed during the hospital encounter of 05/13/20   CBC auto differential   Result Value Ref Range    WBC 14.6 (H) 4.0 - 11.0 K/uL    RBC 4.46 4.20 - 5.90 M/uL    Hemoglobin 15.0 13.5 - 17.5 g/dL    Hematocrit 43.0 40.5 - 52.5 %    MCV 96.3 80.0 - 100.0 fL    MCH 33.6 26.0 - 34.0 pg    MCHC 34.9 31.0 - 36.0 g/dL    RDW 13.9 12.4 - 15.4 %    Platelets 372 482 - 079 K/uL    MPV 8.2 5.0 - 10.5 fL    Neutrophils % 86.6 %    Lymphocytes % 7.6 %    Monocytes % 5.5 %    Eosinophils % 0.0 %    Basophils % 0.3 %    Neutrophils Absolute 12.7 (H) 1.7 - 7.7 K/uL    Lymphocytes Absolute 1.1 1.0 - 5.1 K/uL    Monocytes Absolute 0.8 0.0 - 1.3 K/uL    Eosinophils Absolute 0.0 0.0 - 0.6 K/uL    Basophils Absolute 0.0 0.0 - 0.2 K/uL   Basic Metabolic Panel w/ Reflex to MG   Result Value Ref Range    Sodium 135 (L) 136 - 145 mmol/L    Potassium reflex Magnesium 3.6 3.5 - 5.1 mmol/L    Chloride 99 99 - 110 mmol/L    CO2 22 21 - 32 mmol/L    Anion Gap 14 3 - 16    Glucose 144 (H) 70 - 99 mg/dL    BUN 21 (H) 7 - 20 mg/dL    CREATININE 1.2 0.8 - 1.3 mg/dL    GFR Non-African American 60 (A) >60    GFR African American >60 >60    Calcium 9.0 8.3 - 10.6 mg/dL   Hepatic function panel (LFTs)   Result Value Ref Range    Total Protein 7.4 6.4 - 8.2 g/dL    Alb 4.8 3.4 - 5.0 g/dL    Alkaline Phosphatase 80 40 - 129 U/L    ALT 30 10 - 40 U/L    AST 28 15 - 37 U/L    Total Bilirubin 0.8 0.0 - 1.0 mg/dL    Bilirubin, Direct <0.2 0.0 - 0.3 mg/dL    Bilirubin, Indirect see below 0.0 - 1.0 mg/dL   Lipase   Result Value Ref Range    Lipase 23.0 13.0 - 60.0 U/L   Urinalysis, reflex to microscopic (Lab)   Result Value Ref Range    Color, UA Yellow Straw/Yellow    Clarity, UA Clear Clear    Glucose, Ur Negative Negative mg/dL    Bilirubin Urine Negative Negative    Ketones, Urine TRACE (A) Negative mg/dL    Specific Gravity, UA >=1.030 1.005 - 1.030    Blood, Urine MODERATE (A) Negative    pH, UA 5.5 5.0 - 8.0    Protein, UA TRACE (A) Negative mg/dL    Urobilinogen, Urine 0.2 <2.0 E.U./dL    Nitrite, Urine Negative Negative    Leukocyte Esterase, Urine Negative Negative    Microscopic Examination YES     Urine Type NotGiven    Microscopic Urinalysis   Result Value Ref Range    WBC, UA 6-9 (A) 0 - 5 /HPF    RBC, UA 3-4 0 - 4 /HPF    Epithelial Cells, UA 2-5 0 - 5 /HPF    Bacteria, UA 1+ (A) None Seen /HPF       RECENT VITALS:  BP: (!) 168/77, Temp: 98 °F (36.7 °C), Pulse: 87, Resp: 18, SpO2: 92 %     ED Course     Nursing Notes, Past Medical Hx, Past Surgical Hx, Social Hx, Allergies, and Family Hx were reviewed. The patient was given the following medications:  Orders Placed This Encounter   Medications    0.9 % sodium chloride bolus    ketorolac (TORADOL) injection 15 mg    ondansetron (ZOFRAN) injection 4 mg    morphine injection 4 mg    iopamidol (ISOVUE-370) 76 % injection 80 mL    HYDROmorphone (DILAUDID) injection 1 mg    cefTRIAXone (ROCEPHIN) 1 g IVPB in 50 mL D5W minibag            CONSULTS:  IP CONSULT TO UROLOGY  IP CONSULT TO HOSPITALIST    MEDICAL DECISION MAKING / ASSESSMENT / Parvin Gurdeep is a 78 y.o. male who presents with complaints as noted in HPI. Patient presents the emerge department with a complaint of worsening right lower abdominal pain and right-sided back pain. Sent from PCPs office. Upon arrival patient denied 10 pain, actively guarding his right lower quadrant. No reproducible CVA tenderness. He is afebrile, hemodynamically stable and overall well-appearing.   Patient had an IV placed, was initially given Toradol and Zofran for pain without control, subsequently

## 2020-05-13 NOTE — PROGRESS NOTES
Subjective:      Patient ID: Selene Durham is a 78 y.o. male    Chief Complaint   Patient presents with    Back Pain     over right kidney and radiates into pelic area, started monday thought  a stone and radiates dowsn       Abdominal Pain   This is a new problem. Episode onset: 2 days  The pain is located in the RLQ. The pain is at a severity of 7/10. The pain is severe. The quality of the pain is colicky and aching. The abdominal pain radiates to the right flank and back. Pertinent negatives include no arthralgias, dysuria, fever, headaches, hematuria or nausea. Associated symptoms comments: No fever , no vomiting, no hematuria, appetite normal . The pain is aggravated by palpation. The pain is relieved by nothing. The treatment provided no relief. Hypothyroidism, skin cancer        Current Outpatient Medications on File Prior to Visit   Medication Sig Dispense Refill    levothyroxine (SYNTHROID) 50 MCG tablet TAKE 1 TABLET EVERY DAY 90 tablet 1    vitamin D 1000 UNITS CAPS Take 2,000 Units by mouth       No current facility-administered medications on file prior to visit. No Known Allergies    Review of Systems   Constitutional: Negative for appetite change, chills, fatigue, fever and unexpected weight change. HENT: Negative for hearing loss. Eyes: Negative for redness and visual disturbance. Respiratory: Negative for chest tightness and shortness of breath. Cardiovascular: Negative for chest pain and palpitations. Gastrointestinal: Positive for abdominal pain. Negative for nausea. Endocrine: Negative for polydipsia and polyuria. Genitourinary: Negative for dysuria, hematuria and urgency. Musculoskeletal: Positive for back pain. Negative for arthralgias and neck pain. Skin: Negative for rash and wound. Recent skin treatments, wounds right arm    Neurological: Negative for dizziness and headaches. Hematological: Negative for adenopathy. Does not bruise/bleed easily. Psychiatric/Behavioral: Negative for agitation. The patient is not nervous/anxious. Objective:   Physical Exam  Constitutional:       Appearance: He is obese. Cardiovascular:      Rate and Rhythm: Normal rate and regular rhythm. Pulmonary:      Effort: Pulmonary effort is normal.      Breath sounds: Normal breath sounds. Abdominal:      General: Abdomen is flat. Bowel sounds are normal.      Palpations: Abdomen is soft. There is no mass. Tenderness: There is abdominal tenderness (right lower quadrant tenderness). There is guarding. There is no right CVA tenderness or rebound. Skin:     Comments: Right arm wounds    Neurological:      General: No focal deficit present. Mental Status: He is alert and oriented to person, place, and time. Psychiatric:      Comments: Anxious          Assessment and plan       1. Right lower quadrant abdominal pain  2 days of right lower back pain and right lower quadrant abdominal pain. No fever, hematuria, nausea or vomiting. Normal appetite. Pain level 8 out of 10. Referral to emergency room for further evaluation. This could represent acute appendicitis, or acute kidney stone. Will need further testing. 2. Acute right-sided low back pain without sciatica  History of lower back pain with worsened acute right lower back pain. This pain could radiate to the right lower abdomen. Referral to emergency for evaluation.

## 2020-05-13 NOTE — ED PROVIDER NOTES
ED Attending Attestation Note     Date of evaluation: 5/13/2020    This patient was seen by the advance practice provider. I have seen and examined the patient, agree with the workup, evaluation, management and diagnosis. The care plan has been discussed. My assessment reveals patient with RLQ pain two days ago then today. Found to have 5-6mm right UPJ stone and difficult to control pain. Urology consulted and patient will be admitted.      Roseann Lozano MD  05/13/20 [id number]

## 2020-05-14 ENCOUNTER — ANESTHESIA (OUTPATIENT)
Dept: OPERATING ROOM | Age: 70
DRG: 661 | End: 2020-05-14
Payer: MEDICARE

## 2020-05-14 ENCOUNTER — APPOINTMENT (OUTPATIENT)
Dept: GENERAL RADIOLOGY | Age: 70
DRG: 661 | End: 2020-05-14
Payer: MEDICARE

## 2020-05-14 ENCOUNTER — ANESTHESIA EVENT (OUTPATIENT)
Dept: OPERATING ROOM | Age: 70
DRG: 661 | End: 2020-05-14
Payer: MEDICARE

## 2020-05-14 VITALS
RESPIRATION RATE: 15 BRPM | TEMPERATURE: 97.3 F | SYSTOLIC BLOOD PRESSURE: 97 MMHG | DIASTOLIC BLOOD PRESSURE: 52 MMHG | OXYGEN SATURATION: 96 %

## 2020-05-14 LAB
ANION GAP SERPL CALCULATED.3IONS-SCNC: 9 MMOL/L (ref 3–16)
BASOPHILS ABSOLUTE: 0 K/UL (ref 0–0.2)
BASOPHILS RELATIVE PERCENT: 0.3 %
BUN BLDV-MCNC: 23 MG/DL (ref 7–20)
CALCIUM SERPL-MCNC: 8 MG/DL (ref 8.3–10.6)
CHLORIDE BLD-SCNC: 105 MMOL/L (ref 99–110)
CO2: 23 MMOL/L (ref 21–32)
CREAT SERPL-MCNC: 1.5 MG/DL (ref 0.8–1.3)
EOSINOPHILS ABSOLUTE: 0 K/UL (ref 0–0.6)
EOSINOPHILS RELATIVE PERCENT: 0.4 %
GFR AFRICAN AMERICAN: 56
GFR NON-AFRICAN AMERICAN: 46
GLUCOSE BLD-MCNC: 113 MG/DL (ref 70–99)
HCT VFR BLD CALC: 36.8 % (ref 40.5–52.5)
HEMOGLOBIN: 12.7 G/DL (ref 13.5–17.5)
LYMPHOCYTES ABSOLUTE: 1.9 K/UL (ref 1–5.1)
LYMPHOCYTES RELATIVE PERCENT: 19.3 %
MCH RBC QN AUTO: 33.9 PG (ref 26–34)
MCHC RBC AUTO-ENTMCNC: 34.6 G/DL (ref 31–36)
MCV RBC AUTO: 98 FL (ref 80–100)
MONOCYTES ABSOLUTE: 0.9 K/UL (ref 0–1.3)
MONOCYTES RELATIVE PERCENT: 9.8 %
NEUTROPHILS ABSOLUTE: 6.8 K/UL (ref 1.7–7.7)
NEUTROPHILS RELATIVE PERCENT: 70.2 %
PDW BLD-RTO: 14.1 % (ref 12.4–15.4)
PLATELET # BLD: 140 K/UL (ref 135–450)
PMV BLD AUTO: 7.8 FL (ref 5–10.5)
POTASSIUM REFLEX MAGNESIUM: 4.4 MMOL/L (ref 3.5–5.1)
RBC # BLD: 3.76 M/UL (ref 4.2–5.9)
SARS-COV-2, NAAT: NOT DETECTED
SODIUM BLD-SCNC: 137 MMOL/L (ref 136–145)
URINE CULTURE, ROUTINE: NORMAL
WBC # BLD: 9.6 K/UL (ref 4–11)

## 2020-05-14 PROCEDURE — U0002 COVID-19 LAB TEST NON-CDC: HCPCS

## 2020-05-14 PROCEDURE — C1769 GUIDE WIRE: HCPCS | Performed by: UROLOGY

## 2020-05-14 PROCEDURE — 74420 UROGRAPHY RTRGR +-KUB: CPT

## 2020-05-14 PROCEDURE — 6370000000 HC RX 637 (ALT 250 FOR IP): Performed by: STUDENT IN AN ORGANIZED HEALTH CARE EDUCATION/TRAINING PROGRAM

## 2020-05-14 PROCEDURE — 6360000002 HC RX W HCPCS: Performed by: STUDENT IN AN ORGANIZED HEALTH CARE EDUCATION/TRAINING PROGRAM

## 2020-05-14 PROCEDURE — 80048 BASIC METABOLIC PNL TOTAL CA: CPT

## 2020-05-14 PROCEDURE — C1758 CATHETER, URETERAL: HCPCS | Performed by: UROLOGY

## 2020-05-14 PROCEDURE — 6360000004 HC RX CONTRAST MEDICATION: Performed by: UROLOGY

## 2020-05-14 PROCEDURE — 3600000014 HC SURGERY LEVEL 4 ADDTL 15MIN: Performed by: UROLOGY

## 2020-05-14 PROCEDURE — 2720000010 HC SURG SUPPLY STERILE: Performed by: UROLOGY

## 2020-05-14 PROCEDURE — 2580000003 HC RX 258: Performed by: NURSE ANESTHETIST, CERTIFIED REGISTERED

## 2020-05-14 PROCEDURE — 85025 COMPLETE CBC W/AUTO DIFF WBC: CPT

## 2020-05-14 PROCEDURE — 88300 SURGICAL PATH GROSS: CPT

## 2020-05-14 PROCEDURE — 2709999900 HC NON-CHARGEABLE SUPPLY: Performed by: UROLOGY

## 2020-05-14 PROCEDURE — 82365 CALCULUS SPECTROSCOPY: CPT

## 2020-05-14 PROCEDURE — 2580000003 HC RX 258: Performed by: UROLOGY

## 2020-05-14 PROCEDURE — 3700000001 HC ADD 15 MINUTES (ANESTHESIA): Performed by: UROLOGY

## 2020-05-14 PROCEDURE — 6360000002 HC RX W HCPCS: Performed by: NURSE ANESTHETIST, CERTIFIED REGISTERED

## 2020-05-14 PROCEDURE — 1200000000 HC SEMI PRIVATE

## 2020-05-14 PROCEDURE — 2580000003 HC RX 258: Performed by: STUDENT IN AN ORGANIZED HEALTH CARE EDUCATION/TRAINING PROGRAM

## 2020-05-14 PROCEDURE — 3600000004 HC SURGERY LEVEL 4 BASE: Performed by: UROLOGY

## 2020-05-14 PROCEDURE — 7100000000 HC PACU RECOVERY - FIRST 15 MIN: Performed by: UROLOGY

## 2020-05-14 PROCEDURE — 99232 SBSQ HOSP IP/OBS MODERATE 35: CPT | Performed by: HOSPITALIST

## 2020-05-14 PROCEDURE — 2500000003 HC RX 250 WO HCPCS: Performed by: NURSE ANESTHETIST, CERTIFIED REGISTERED

## 2020-05-14 PROCEDURE — C2617 STENT, NON-COR, TEM W/O DEL: HCPCS | Performed by: UROLOGY

## 2020-05-14 PROCEDURE — 7100000001 HC PACU RECOVERY - ADDTL 15 MIN: Performed by: UROLOGY

## 2020-05-14 PROCEDURE — 3700000000 HC ANESTHESIA ATTENDED CARE: Performed by: UROLOGY

## 2020-05-14 PROCEDURE — 36415 COLL VENOUS BLD VENIPUNCTURE: CPT

## 2020-05-14 DEVICE — STENT URET 6FR L24CM PERCFLX HYDR+ DBL PGTL THRD 2: Type: IMPLANTABLE DEVICE | Site: URETER | Status: FUNCTIONAL

## 2020-05-14 RX ORDER — PROCHLORPERAZINE EDISYLATE 5 MG/ML
5 INJECTION INTRAMUSCULAR; INTRAVENOUS
Status: DISCONTINUED | OUTPATIENT
Start: 2020-05-14 | End: 2020-05-14 | Stop reason: HOSPADM

## 2020-05-14 RX ORDER — HYDROCODONE BITARTRATE AND ACETAMINOPHEN 5; 325 MG/1; MG/1
1 TABLET ORAL
Status: DISCONTINUED | OUTPATIENT
Start: 2020-05-14 | End: 2020-05-14 | Stop reason: HOSPADM

## 2020-05-14 RX ORDER — MEPERIDINE HYDROCHLORIDE 25 MG/ML
12.5 INJECTION INTRAMUSCULAR; INTRAVENOUS; SUBCUTANEOUS EVERY 5 MIN PRN
Status: DISCONTINUED | OUTPATIENT
Start: 2020-05-14 | End: 2020-05-14 | Stop reason: HOSPADM

## 2020-05-14 RX ORDER — PROPOFOL 10 MG/ML
INJECTION, EMULSION INTRAVENOUS PRN
Status: DISCONTINUED | OUTPATIENT
Start: 2020-05-14 | End: 2020-05-14 | Stop reason: SDUPTHER

## 2020-05-14 RX ORDER — GLYCOPYRROLATE 0.2 MG/ML
INJECTION INTRAMUSCULAR; INTRAVENOUS PRN
Status: DISCONTINUED | OUTPATIENT
Start: 2020-05-14 | End: 2020-05-14 | Stop reason: SDUPTHER

## 2020-05-14 RX ORDER — SODIUM CHLORIDE 9 MG/ML
INJECTION, SOLUTION INTRAVENOUS CONTINUOUS
Status: DISCONTINUED | OUTPATIENT
Start: 2020-05-14 | End: 2020-05-15 | Stop reason: HOSPADM

## 2020-05-14 RX ORDER — MAGNESIUM HYDROXIDE 1200 MG/15ML
LIQUID ORAL PRN
Status: DISCONTINUED | OUTPATIENT
Start: 2020-05-14 | End: 2020-05-14 | Stop reason: HOSPADM

## 2020-05-14 RX ORDER — ONDANSETRON 2 MG/ML
INJECTION INTRAMUSCULAR; INTRAVENOUS PRN
Status: DISCONTINUED | OUTPATIENT
Start: 2020-05-14 | End: 2020-05-14 | Stop reason: SDUPTHER

## 2020-05-14 RX ORDER — SODIUM CHLORIDE, SODIUM LACTATE, POTASSIUM CHLORIDE, CALCIUM CHLORIDE 600; 310; 30; 20 MG/100ML; MG/100ML; MG/100ML; MG/100ML
INJECTION, SOLUTION INTRAVENOUS CONTINUOUS PRN
Status: DISCONTINUED | OUTPATIENT
Start: 2020-05-14 | End: 2020-05-14 | Stop reason: SDUPTHER

## 2020-05-14 RX ORDER — DIPHENHYDRAMINE HYDROCHLORIDE 50 MG/ML
12.5 INJECTION INTRAMUSCULAR; INTRAVENOUS
Status: DISCONTINUED | OUTPATIENT
Start: 2020-05-14 | End: 2020-05-14 | Stop reason: HOSPADM

## 2020-05-14 RX ORDER — HYDRALAZINE HYDROCHLORIDE 20 MG/ML
5 INJECTION INTRAMUSCULAR; INTRAVENOUS EVERY 10 MIN PRN
Status: DISCONTINUED | OUTPATIENT
Start: 2020-05-14 | End: 2020-05-14 | Stop reason: HOSPADM

## 2020-05-14 RX ORDER — FENTANYL CITRATE 50 UG/ML
INJECTION, SOLUTION INTRAMUSCULAR; INTRAVENOUS PRN
Status: DISCONTINUED | OUTPATIENT
Start: 2020-05-14 | End: 2020-05-14 | Stop reason: SDUPTHER

## 2020-05-14 RX ORDER — LIDOCAINE HYDROCHLORIDE 20 MG/ML
INJECTION, SOLUTION INTRAVENOUS PRN
Status: DISCONTINUED | OUTPATIENT
Start: 2020-05-14 | End: 2020-05-14 | Stop reason: SDUPTHER

## 2020-05-14 RX ORDER — ONDANSETRON 2 MG/ML
4 INJECTION INTRAMUSCULAR; INTRAVENOUS
Status: DISCONTINUED | OUTPATIENT
Start: 2020-05-14 | End: 2020-05-14 | Stop reason: HOSPADM

## 2020-05-14 RX ORDER — 0.9 % SODIUM CHLORIDE 0.9 %
500 INTRAVENOUS SOLUTION INTRAVENOUS
Status: DISCONTINUED | OUTPATIENT
Start: 2020-05-14 | End: 2020-05-14 | Stop reason: HOSPADM

## 2020-05-14 RX ADMIN — SODIUM CHLORIDE 990 ML: 9 INJECTION, SOLUTION INTRAVENOUS at 20:08

## 2020-05-14 RX ADMIN — Medication 10 ML: at 08:19

## 2020-05-14 RX ADMIN — PROPOFOL 100 MG: 10 INJECTION, EMULSION INTRAVENOUS at 18:39

## 2020-05-14 RX ADMIN — HYDROMORPHONE HYDROCHLORIDE 0.5 MG: 1 INJECTION, SOLUTION INTRAMUSCULAR; INTRAVENOUS; SUBCUTANEOUS at 02:27

## 2020-05-14 RX ADMIN — ONDANSETRON 4 MG: 2 INJECTION INTRAMUSCULAR; INTRAVENOUS at 18:34

## 2020-05-14 RX ADMIN — HYDROMORPHONE HYDROCHLORIDE 0.5 MG: 1 INJECTION, SOLUTION INTRAMUSCULAR; INTRAVENOUS; SUBCUTANEOUS at 06:24

## 2020-05-14 RX ADMIN — HYDROMORPHONE HYDROCHLORIDE 0.5 MG: 1 INJECTION, SOLUTION INTRAMUSCULAR; INTRAVENOUS; SUBCUTANEOUS at 17:05

## 2020-05-14 RX ADMIN — PHENYLEPHRINE HYDROCHLORIDE 100 MCG: 10 INJECTION, SOLUTION INTRAMUSCULAR; INTRAVENOUS; SUBCUTANEOUS at 18:48

## 2020-05-14 RX ADMIN — LIDOCAINE HYDROCHLORIDE 100 MG: 20 INJECTION, SOLUTION INTRAVENOUS at 18:34

## 2020-05-14 RX ADMIN — TAMSULOSIN HYDROCHLORIDE 0.4 MG: 0.4 CAPSULE ORAL at 08:18

## 2020-05-14 RX ADMIN — SODIUM CHLORIDE: 9 INJECTION, SOLUTION INTRAVENOUS at 10:09

## 2020-05-14 RX ADMIN — PHENYLEPHRINE HYDROCHLORIDE 200 MCG: 10 INJECTION, SOLUTION INTRAMUSCULAR; INTRAVENOUS; SUBCUTANEOUS at 18:51

## 2020-05-14 RX ADMIN — ACETAMINOPHEN 650 MG: 325 TABLET ORAL at 01:14

## 2020-05-14 RX ADMIN — HYDROMORPHONE HYDROCHLORIDE 1 MG: 1 INJECTION, SOLUTION INTRAMUSCULAR; INTRAVENOUS; SUBCUTANEOUS at 10:08

## 2020-05-14 RX ADMIN — Medication 10 ML: at 21:00

## 2020-05-14 RX ADMIN — HYDROMORPHONE HYDROCHLORIDE 1 MG: 1 INJECTION, SOLUTION INTRAMUSCULAR; INTRAVENOUS; SUBCUTANEOUS at 13:48

## 2020-05-14 RX ADMIN — ACETAMINOPHEN 650 MG: 325 TABLET ORAL at 23:54

## 2020-05-14 RX ADMIN — LEVOTHYROXINE SODIUM 50 MCG: 0.05 TABLET ORAL at 06:24

## 2020-05-14 RX ADMIN — SODIUM CHLORIDE, SODIUM LACTATE, POTASSIUM CHLORIDE, AND CALCIUM CHLORIDE: 600; 310; 30; 20 INJECTION, SOLUTION INTRAVENOUS at 18:34

## 2020-05-14 RX ADMIN — CEFTRIAXONE 1 G: 1 INJECTION, POWDER, FOR SOLUTION INTRAMUSCULAR; INTRAVENOUS at 13:50

## 2020-05-14 RX ADMIN — PHENYLEPHRINE HYDROCHLORIDE 100 MCG: 10 INJECTION, SOLUTION INTRAMUSCULAR; INTRAVENOUS; SUBCUTANEOUS at 18:53

## 2020-05-14 RX ADMIN — HYDROMORPHONE HYDROCHLORIDE 0.25 MG: 1 INJECTION, SOLUTION INTRAMUSCULAR; INTRAVENOUS; SUBCUTANEOUS at 22:07

## 2020-05-14 RX ADMIN — GLYCOPYRROLATE 0.4 MG: 0.2 INJECTION INTRAMUSCULAR; INTRAVENOUS at 18:52

## 2020-05-14 RX ADMIN — FENTANYL CITRATE 100 MCG: 50 INJECTION INTRAMUSCULAR; INTRAVENOUS at 18:39

## 2020-05-14 RX ADMIN — LIDOCAINE HYDROCHLORIDE 100 MG: 20 INJECTION, SOLUTION INTRAVENOUS at 18:36

## 2020-05-14 ASSESSMENT — PAIN DESCRIPTION - PROGRESSION
CLINICAL_PROGRESSION: NOT CHANGED
CLINICAL_PROGRESSION: GRADUALLY IMPROVING
CLINICAL_PROGRESSION: NOT CHANGED
CLINICAL_PROGRESSION: GRADUALLY WORSENING

## 2020-05-14 ASSESSMENT — PAIN SCALES - GENERAL
PAINLEVEL_OUTOF10: 3
PAINLEVEL_OUTOF10: 0
PAINLEVEL_OUTOF10: 0
PAINLEVEL_OUTOF10: 3
PAINLEVEL_OUTOF10: 7
PAINLEVEL_OUTOF10: 3
PAINLEVEL_OUTOF10: 7
PAINLEVEL_OUTOF10: 0
PAINLEVEL_OUTOF10: 4
PAINLEVEL_OUTOF10: 7
PAINLEVEL_OUTOF10: 5
PAINLEVEL_OUTOF10: 0
PAINLEVEL_OUTOF10: 3
PAINLEVEL_OUTOF10: 0
PAINLEVEL_OUTOF10: 7
PAINLEVEL_OUTOF10: 0
PAINLEVEL_OUTOF10: 0
PAINLEVEL_OUTOF10: 1
PAINLEVEL_OUTOF10: 7

## 2020-05-14 ASSESSMENT — PULMONARY FUNCTION TESTS
PIF_VALUE: 4
PIF_VALUE: 3
PIF_VALUE: 0
PIF_VALUE: 3
PIF_VALUE: 24
PIF_VALUE: 4
PIF_VALUE: 27
PIF_VALUE: 0
PIF_VALUE: 29
PIF_VALUE: 8
PIF_VALUE: 3
PIF_VALUE: 0
PIF_VALUE: 3
PIF_VALUE: 3
PIF_VALUE: 5
PIF_VALUE: 4
PIF_VALUE: 3
PIF_VALUE: 0
PIF_VALUE: 3
PIF_VALUE: 32
PIF_VALUE: 21
PIF_VALUE: 1
PIF_VALUE: 35
PIF_VALUE: 3
PIF_VALUE: 0
PIF_VALUE: 3
PIF_VALUE: 21
PIF_VALUE: 4
PIF_VALUE: 6
PIF_VALUE: 21
PIF_VALUE: 4
PIF_VALUE: 3
PIF_VALUE: 4
PIF_VALUE: 1
PIF_VALUE: 4
PIF_VALUE: 22
PIF_VALUE: 0
PIF_VALUE: 3
PIF_VALUE: 4
PIF_VALUE: 3
PIF_VALUE: 17
PIF_VALUE: 3
PIF_VALUE: 18
PIF_VALUE: 21
PIF_VALUE: 2
PIF_VALUE: 3
PIF_VALUE: 4
PIF_VALUE: 3
PIF_VALUE: 35
PIF_VALUE: 4
PIF_VALUE: 3
PIF_VALUE: 3
PIF_VALUE: 22
PIF_VALUE: 3
PIF_VALUE: 1
PIF_VALUE: 35
PIF_VALUE: 23
PIF_VALUE: 35

## 2020-05-14 ASSESSMENT — PAIN - FUNCTIONAL ASSESSMENT
PAIN_FUNCTIONAL_ASSESSMENT: ACTIVITIES ARE NOT PREVENTED
PAIN_FUNCTIONAL_ASSESSMENT: ACTIVITIES ARE NOT PREVENTED
PAIN_FUNCTIONAL_ASSESSMENT: PREVENTS OR INTERFERES SOME ACTIVE ACTIVITIES AND ADLS
PAIN_FUNCTIONAL_ASSESSMENT: ACTIVITIES ARE NOT PREVENTED

## 2020-05-14 ASSESSMENT — PAIN DESCRIPTION - DESCRIPTORS
DESCRIPTORS: ACHING;SHARP
DESCRIPTORS: SHARP;CONSTANT
DESCRIPTORS: ACHING;SHARP
DESCRIPTORS: DULL

## 2020-05-14 ASSESSMENT — PAIN DESCRIPTION - ONSET
ONSET: ON-GOING

## 2020-05-14 ASSESSMENT — PAIN DESCRIPTION - LOCATION
LOCATION: BACK;PENIS
LOCATION: ABDOMEN;BACK
LOCATION: BACK;PENIS
LOCATION: ABDOMEN

## 2020-05-14 ASSESSMENT — LIFESTYLE VARIABLES: SMOKING_STATUS: 0

## 2020-05-14 ASSESSMENT — PAIN DESCRIPTION - PAIN TYPE
TYPE: SURGICAL PAIN
TYPE: ACUTE PAIN
TYPE: SURGICAL PAIN
TYPE: ACUTE PAIN

## 2020-05-14 ASSESSMENT — PAIN DESCRIPTION - ORIENTATION
ORIENTATION: RIGHT;LEFT
ORIENTATION: RIGHT
ORIENTATION: RIGHT;LEFT
ORIENTATION: RIGHT

## 2020-05-14 ASSESSMENT — PAIN DESCRIPTION - FREQUENCY
FREQUENCY: CONTINUOUS

## 2020-05-14 NOTE — ANESTHESIA PRE PROCEDURE
with ureteropelvic junction (UPJ) obstruction Q62.11    Right flank pain R10.9    Elevated blood pressure reading without diagnosis of hypertension R03.0    MI (acute kidney injury) ([address] N17.9       Past Medical History:        Diagnosis Date    Hyperlipidemia     Kidney stone 5/13/2020    Scalp lesion 2017    squamous cell Ca    Thyroid disease        Past Surgical History:        Procedure Laterality Date    COLONOSCOPY  4/4/2005    COLONOSCOPY  01/05/2018    small polyps 5-10 years adenomatousvs hyperplastic    MOHS SURGERY  2016    Dr Boo Reza, scalp lesion , squamous cell cancer     SINUS SURGERY      TONSILLECTOMY         Social History:    Social History     Tobacco Use    Smoking status: Never Smoker    Smokeless tobacco: Never Used   Substance Use Topics    Alcohol use: No                                Counseling given: Not Answered      Vital Signs (Current):   Vitals:    05/14/20 0710 05/14/20 0814 05/14/20 1045 05/14/20 1446   BP: (!) 90/54 121/67 115/69 (!) 106/52   Pulse: 71 70 77 82   Resp: 18 18 18 16   Temp: 98.1 °F (36.7 °C)  98.1 °F (36.7 °C) 98.2 °F (36.8 °C)   TempSrc: Oral  Oral Oral   SpO2: 96%  91% 92%   Weight:       Height:                                                  BP Readings from Last 3 Encounters:   05/14/20 (!) 106/52   05/13/20 (!) 168/90   02/06/20 (!) 141/87       NPO Status: Time of last liquid consumption: 2300                        Time of last solid consumption: 2300                                                      BMI:   Wt Readings from Last 3 Encounters:   05/13/20 210 lb (95.3 kg)   05/13/20 212 lb (96.2 kg)   02/06/20 213 lb 6.4 oz (96.8 kg)     Body mass index is 31.93 kg/m².     CBC:   Lab Results   Component Value Date    WBC 9.6 05/14/2020    RBC 3.76 05/14/2020    HGB 12.7 05/14/2020    HCT 36.8 05/14/2020    MCV 98.0 05/14/2020    RDW 14.1 05/14/2020     05/14/2020       CMP:   Lab Results   Component Value Date     05/14/2020

## 2020-05-14 NOTE — CONSULTS
JULI 45 05/14/2020     PT/INR:  No results found for: PROTIME, INR  PTT:  No results found for: APTT[APTT      Antibiotic Therapy: rocephin    Imaging: ct scan  Impression       1.  Obstructing 5 mm calculus in the right UPJ causing mild to moderate hydronephrosis, delayed nephrogram and perinephric stranding. 2.  2 mm nonobstructing calculus in the left kidney. 3.  Uncomplicated mild colonic diverticulosis. 4.  Bilateral femoral head avascular necrosis; early/focal articular surface collapse in the right femoral head. 5.  Hepatic steatosis. 6.  Fat-containing inguinal and umbilical hernias. Impression/Plan: 79 yo with right ureteral stone and colic, right hydro and elevated creatinine  -unfortunately, there was a communication error and urology was not aware he was here  -we are rushing asap to the OR to do a right ureteroscopy.  The stone is very high and he understands he may need a second procedure    Dennis Lopez MD

## 2020-05-14 NOTE — BRIEF OP NOTE
Brief Postoperative Note      Patient: Melanie Emmanuel  YOB: 1950  MRN: [id number]    Date of Procedure: 5/14/2020    Pre-Op Diagnosis: Right Obstructive Kidney Stone    Post-Op Diagnosis: Same       Procedure(s):  CYSTOSCOPY RIGHT URETEROSCOPY/LASER LITHOTRIPSY AND RIGHT STENT INSERTION    Surgeon(s):  Joshua Coker MD    Assistant:  * No surgical staff found *    Anesthesia: General    Estimated Blood Loss (mL): less than 50     Complications: None    Specimens:   * No specimens in log *    Implants:  * No implants in log *      Drains: * No LDAs found *    Findings: STONE AT RT UPJ. LASERED IN THE KIDNEY. FRAGMENTS REMOVED AS MUCH AS POSSIBLE. PLAN: KEEP OVERNIGHT. CHECK RENAL FXN IN AM. REMOVE STENT IN >7 DAYS AS OUTPATIENT.     Electronically signed by Terra Mejia MD on 5/14/2020 at 6:46 PM

## 2020-05-14 NOTE — PROGRESS NOTES
Rapid COVID-19 collected. Patient tolerated well. Specimen walked to lab by this RN.  Laura Bernal R.N.

## 2020-05-14 NOTE — PROGRESS NOTES
Patient alert and orientated. VSS. Patient pain controlled with IV dilaudid. Patient tolerates ambulation well, voiding adequately overnight. Patient NPO for procedure today. IV fluids currently running. Patient denies all other needs at this time. Fall precautions in place. Call light within reach. Will continue to assess and monitor.

## 2020-05-14 NOTE — PROGRESS NOTES
Progress Note    Admit Date: 5/13/2020  Day: 1  Diet: Diet NPO, After Midnight    CC: RLQ pain    Interval history: No acute events overnight. Pt examined bedside this am. Pain well controlled and denies any complaints. Will have procedure for kidney stone today. HPI: Radha Velasquez a 78 y. o. male past medical history of thyroid disease, hyperlipidemia; who presents to the emergency department with a complaint of right lower quadrant abdominal cramping and right lower back pain. Patient states symptoms started on Monday, however seem to dissipate on their own Tuesday he seemed fine, and then today the pain seemed to worsen. Patient states it is constant is a cramping pain. Patient denies history of abdominal surgeries, denies known history of kidney stones however states he has a familial history. Denies recent fevers, chills, sweats, nausea vomiting or diarrhea, urinary changes or hematuria. Denies injuries or falls. Pain 9/10, no exacerbating or alleviating factors, has not tried any home medications.     CT found R UPJ 5mm obstructing stone with mild/moderate hydronephrosis. Pain controlled with Dilaudid. Patient will be admitted for urology procedure in the am and pain control.     Medications:     Scheduled Meds:   levothyroxine  50 mcg Oral Daily    sodium chloride flush  10 mL Intravenous 2 times per day    [Held by provider] enoxaparin  40 mg Subcutaneous Daily    cefTRIAXone (ROCEPHIN) IV  1 g Intravenous Q24H    tamsulosin  0.4 mg Oral Daily    [Held by provider] NIFEdipine  30 mg Oral Daily     Continuous Infusions:   sodium chloride 125 mL/hr at 05/14/20 1009     PRN Meds:ondansetron, sodium chloride flush, acetaminophen **OR** acetaminophen, polyethylene glycol, promethazine **OR** ondansetron, HYDROmorphone **OR** HYDROmorphone    Objective:   Vitals:   T-max:  Patient Vitals for the past 8 hrs:   BP Temp Temp src Pulse Resp SpO2   05/14/20 0814 121/67 -- -- 70 18 --   05/14/20 0710 (!) 90/54 98.1 °F (36.7 °C) Oral 71 18 96 %   05/14/20 0257 (!) 62/53 98 °F (36.7 °C) Oral 70 16 94 %     No intake or output data in the 24 hours ending 05/14/20 1039    Review of Systems   All other systems reviewed and are negative. Physical Exam  Constitutional:       General: He is not in acute distress. Appearance: Normal appearance. HENT:      Head: Normocephalic. Mouth/Throat:      Mouth: Mucous membranes are moist.   Cardiovascular:      Rate and Rhythm: Normal rate and regular rhythm. Pulses: Normal pulses. Heart sounds: Normal heart sounds. Pulmonary:      Effort: Pulmonary effort is normal.      Breath sounds: Normal breath sounds. Abdominal:      General: Abdomen is flat. Bowel sounds are normal.      Palpations: Abdomen is soft. Tenderness: There is abdominal tenderness (RLQ). Musculoskeletal:         General: No tenderness. Right lower leg: No edema. Left lower leg: No edema. Skin:     General: Skin is warm. Capillary Refill: Capillary refill takes less than 2 seconds. Neurological:      General: No focal deficit present. Mental Status: He is alert. Psychiatric:         Mood and Affect: Mood normal.         LABS:    CBC:   Recent Labs     05/13/20  1147 05/14/20  0501   WBC 14.6* 9.6   HGB 15.0 12.7*   HCT 43.0 36.8*    140   MCV 96.3 98.0     Renal:    Recent Labs     05/13/20  1147 05/14/20  0501   * 137   K 3.6 4.4   CL 99 105   CO2 22 23   BUN 21* 23*   CREATININE 1.2 1.5*   GLUCOSE 144* 113*   CALCIUM 9.0 8.0*   ANIONGAP 14 9     Hepatic:   Recent Labs     05/13/20  1147   AST 28   ALT 30   BILITOT 0.8   BILIDIR <0.2   PROT 7.4   LABALBU 4.8   ALKPHOS 80     Troponin: No results for input(s): TROPONINI in the last 72 hours. BNP: No results for input(s): BNP in the last 72 hours. Lipids: No results for input(s): CHOL, HDL in the last 72 hours.     Invalid input(s): LDLCALCU, TRIGLYCERIDE  ABGs:  No results for input(s):

## 2020-05-15 VITALS
BODY MASS INDEX: 31.83 KG/M2 | RESPIRATION RATE: 18 BRPM | DIASTOLIC BLOOD PRESSURE: 73 MMHG | HEART RATE: 78 BPM | WEIGHT: 210 LBS | SYSTOLIC BLOOD PRESSURE: 121 MMHG | OXYGEN SATURATION: 93 % | TEMPERATURE: 98.6 F | HEIGHT: 68 IN

## 2020-05-15 LAB
ANION GAP SERPL CALCULATED.3IONS-SCNC: 9 MMOL/L (ref 3–16)
BASOPHILS ABSOLUTE: 0 K/UL (ref 0–0.2)
BASOPHILS RELATIVE PERCENT: 0.4 %
BUN BLDV-MCNC: 16 MG/DL (ref 7–20)
CALCIUM SERPL-MCNC: 8.3 MG/DL (ref 8.3–10.6)
CHLORIDE BLD-SCNC: 107 MMOL/L (ref 99–110)
CO2: 22 MMOL/L (ref 21–32)
CREAT SERPL-MCNC: 1 MG/DL (ref 0.8–1.3)
EOSINOPHILS ABSOLUTE: 0 K/UL (ref 0–0.6)
EOSINOPHILS RELATIVE PERCENT: 0.4 %
GFR AFRICAN AMERICAN: >60
GFR NON-AFRICAN AMERICAN: >60
GLUCOSE BLD-MCNC: 106 MG/DL (ref 70–99)
HCT VFR BLD CALC: 37.5 % (ref 40.5–52.5)
HEMOGLOBIN: 12.9 G/DL (ref 13.5–17.5)
LYMPHOCYTES ABSOLUTE: 1.6 K/UL (ref 1–5.1)
LYMPHOCYTES RELATIVE PERCENT: 18.2 %
MCH RBC QN AUTO: 33.9 PG (ref 26–34)
MCHC RBC AUTO-ENTMCNC: 34.4 G/DL (ref 31–36)
MCV RBC AUTO: 98.7 FL (ref 80–100)
MONOCYTES ABSOLUTE: 0.8 K/UL (ref 0–1.3)
MONOCYTES RELATIVE PERCENT: 9.2 %
NEUTROPHILS ABSOLUTE: 6.2 K/UL (ref 1.7–7.7)
NEUTROPHILS RELATIVE PERCENT: 71.8 %
PDW BLD-RTO: 13.7 % (ref 12.4–15.4)
PLATELET # BLD: 128 K/UL (ref 135–450)
PMV BLD AUTO: 7.9 FL (ref 5–10.5)
POTASSIUM REFLEX MAGNESIUM: 3.9 MMOL/L (ref 3.5–5.1)
RBC # BLD: 3.8 M/UL (ref 4.2–5.9)
SODIUM BLD-SCNC: 138 MMOL/L (ref 136–145)
WBC # BLD: 8.7 K/UL (ref 4–11)

## 2020-05-15 PROCEDURE — 85025 COMPLETE CBC W/AUTO DIFF WBC: CPT

## 2020-05-15 PROCEDURE — 99238 HOSP IP/OBS DSCHRG MGMT 30/<: CPT | Performed by: HOSPITALIST

## 2020-05-15 PROCEDURE — 6360000002 HC RX W HCPCS: Performed by: UROLOGY

## 2020-05-15 PROCEDURE — 36415 COLL VENOUS BLD VENIPUNCTURE: CPT

## 2020-05-15 PROCEDURE — 6370000000 HC RX 637 (ALT 250 FOR IP): Performed by: UROLOGY

## 2020-05-15 PROCEDURE — 80048 BASIC METABOLIC PNL TOTAL CA: CPT

## 2020-05-15 PROCEDURE — 2580000003 HC RX 258: Performed by: UROLOGY

## 2020-05-15 RX ORDER — CEPHALEXIN 500 MG/1
500 CAPSULE ORAL 4 TIMES DAILY
Qty: 16 CAPSULE | Refills: 0 | Status: SHIPPED | OUTPATIENT
Start: 2020-05-15 | End: 2020-05-19

## 2020-05-15 RX ORDER — OXYCODONE HYDROCHLORIDE AND ACETAMINOPHEN 5; 325 MG/1; MG/1
1 TABLET ORAL EVERY 6 HOURS PRN
Qty: 15 TABLET | Refills: 0 | Status: SHIPPED | OUTPATIENT
Start: 2020-05-15 | End: 2020-05-19

## 2020-05-15 RX ADMIN — Medication 10 ML: at 08:13

## 2020-05-15 RX ADMIN — TAMSULOSIN HYDROCHLORIDE 0.4 MG: 0.4 CAPSULE ORAL at 08:12

## 2020-05-15 RX ADMIN — LEVOTHYROXINE SODIUM 50 MCG: 0.05 TABLET ORAL at 07:16

## 2020-05-15 RX ADMIN — ACETAMINOPHEN 650 MG: 325 TABLET ORAL at 08:12

## 2020-05-15 RX ADMIN — ENOXAPARIN SODIUM 40 MG: 40 INJECTION SUBCUTANEOUS at 08:12

## 2020-05-15 ASSESSMENT — PAIN SCALES - GENERAL
PAINLEVEL_OUTOF10: 3
PAINLEVEL_OUTOF10: 0
PAINLEVEL_OUTOF10: 0

## 2020-05-15 ASSESSMENT — PAIN DESCRIPTION - DESCRIPTORS: DESCRIPTORS: ACHING

## 2020-05-15 ASSESSMENT — PAIN DESCRIPTION - ORIENTATION: ORIENTATION: RIGHT;LEFT

## 2020-05-15 ASSESSMENT — PAIN DESCRIPTION - PROGRESSION: CLINICAL_PROGRESSION: NOT CHANGED

## 2020-05-15 ASSESSMENT — PAIN DESCRIPTION - ONSET: ONSET: ON-GOING

## 2020-05-15 ASSESSMENT — PAIN DESCRIPTION - FREQUENCY: FREQUENCY: CONTINUOUS

## 2020-05-15 ASSESSMENT — PAIN DESCRIPTION - PAIN TYPE: TYPE: SURGICAL PAIN

## 2020-05-15 NOTE — PLAN OF CARE
Problem: Pain:  Goal: Pain level will decrease  Description: Pain level will decrease  5/15/2020 0210 by Cristine Blakely RN  Outcome: Ongoing     Problem: Falls - Risk of:  Goal: Will remain free from falls  Description: Will remain free from falls  5/15/2020 0210 by Cristine Blakely RN  Outcome: Ongoing

## 2020-05-15 NOTE — CARE COORDINATION
Case Management Assessment            Discharge Note                    Date / Time of Note: 5/15/2020 9:30 AM                  Discharge Note Completed by: Karel Jb    Patient Name: Nader Bishop   YOB: 1950  Diagnosis: Kidney stone [N20.0]  Kidney stone [N20.0]   Date / Time: 5/13/2020 11:27 AM    Current PCP: Moe Ward MD  Clinic patient: No    Hospitalization in the last 30 days: No    Advance Directives:  Code Status: Full Code  [place] DNR form completed and on chart: No    Financial:  Payor: [employer] / Plan: [employer] / Product Type: *No Product type* /      Pharmacy:    [facility], [address] [phone] Arlene Eugene [phone]  [address] [place] [id number]  Phone: [phone] Fax: [phone]    [id number] [place], [address] [facility] [phone] Arlene Eugene [phone]  [address] [place] [address]  Phone: [phone] Fax: [phone]      Assistance purchasing medications?: Potential Assistance Purchasing Medications: No  Assistance provided by Case Management: None at this time    Does patient want to participate in local refill/ meds to beds program?: No    Meds To Beds General Rules:  1. Can ONLY be done Monday- Friday between 8:30am-5pm  2. Prescription(s) must be in pharmacy by 3pm to be filled same day  3. Copy of patient's insurance/ prescription drug card and patient face sheet must be sent along with the prescription(s)  4. Cost of Rx cannot be added to hospital bill. If financial assistance is needed, please contact unit  or ;  or  CANNOT provide pharmacy voucher for patients co-pays  5.  Patients can then  the prescription on their way out of the hospital at discharge, or pharmacy can deliver to the bedside if staff is available. (payment due at time of pick-up or delivery - cash, check, or card accepted)     Able to afford home medications/

## 2020-05-15 NOTE — PROGRESS NOTES
From OR to [facility] accompanied by kj mckeon and monitors applied. intraop meds discussed. Pt arrives with oral airway in place, per faces scale 0/10 pain,.

## 2020-05-15 NOTE — PROGRESS NOTES
Oral airway removed, pt had urge to void, sat on the side of the bed and attempted to urinate without success via urinal.

## 2020-05-15 NOTE — PROGRESS NOTES
Discharge order received. Patient informed of discharge order. Discharge instructions reviewed with patient. Copy of discharge instructions given to patient. Signed prescriptions given to patient. Patient verbalized understanding, denies needs or questions at this time. IV  removed. All patient belongings packed and sent with patient upon discharge.

## 2020-05-15 NOTE — DISCHARGE SUMMARY
admitted for urology procedure in the am and pain control. Hospital Course:  Patient underwent stent placement at the R J performed by urology. Tolerated procedure well and was able to be discharged. Given Rx for Keflex to finish a 7 day course of abx and 4 day supply of Percocet for pain control. Patient will follow up with urology in 7 days. Physical Exam Performed:     /73   Pulse 78   Temp 98.6 °F (37 °C) (Oral)   Resp 18   Ht 5' 8\" (1.727 m)   Wt 210 lb (95.3 kg)   SpO2 93%   BMI 31.93 kg/m²       Physical Exam  Constitutional:       General: He is not in acute distress. Appearance: Normal appearance. HENT:      Head: Normocephalic. Mouth/Throat:      Mouth: Mucous membranes are moist.   Cardiovascular:      Rate and Rhythm: Normal rate and regular rhythm. Pulses: Normal pulses. Heart sounds: Normal heart sounds. Pulmonary:      Effort: Pulmonary effort is normal.      Breath sounds: Normal breath sounds. Abdominal:      General: Abdomen is flat. Bowel sounds are normal.      Palpations: Abdomen is soft. Tenderness: There is abdominal tenderness (Mild RLQ). Musculoskeletal:         General: No tenderness. Right lower leg: No edema. Left lower leg: No edema. Skin:     General: Skin is warm. Capillary Refill: Capillary refill takes less than 2 seconds. Neurological:      General: No focal deficit present. Mental Status: He is alert. Psychiatric:         Mood and Affect: Mood normal.           Significant Diagnostic Studies    Labs:   For convenience and continuity at follow-up the following most recent labs are provided:    CBC:    Lab Results   Component Value Date    WBC 8.7 05/15/2020    HGB 12.9 05/15/2020    HCT 37.5 05/15/2020     05/15/2020       Renal:    Lab Results   Component Value Date     05/15/2020    K 3.9 05/15/2020     05/15/2020    CO2 22 05/15/2020    BUN 16 05/15/2020    CREATININE 1.0 05/15/2020 CALCIUM 8.3 05/15/2020       Radiology:   FL RETROGRADE PYELOGRAM W WO KUB   Final Result      As above. CT ABDOMEN PELVIS W IV CONTRAST Additional Contrast? None   Final Result      1. Obstructing 5 mm calculus in the right UPJ causing mild to moderate hydronephrosis, delayed nephrogram and perinephric stranding. 2.  2 mm nonobstructing calculus in the left kidney. 3.  Uncomplicated mild colonic diverticulosis. 4.  Bilateral femoral head avascular necrosis; early/focal articular surface collapse in the right femoral head. 5.  Hepatic steatosis. 6.  Fat-containing inguinal and umbilical hernias. Consults:     IP CONSULT TO UROLOGY  IP CONSULT TO PRIMARY CARE PROVIDER  IP CONSULT TO RESIDENT INTERNAL MEDICINE        Condition at Discharge: Stable    Discharge Instructions/Follow-up:   See dc instructions on chart      Activity: activity as tolerated    Diet: DIET GENERAL;       Discharge Medications:        Medication List      START taking these medications    cephALEXin 500 MG capsule  Commonly known as:  Keflex  Take 1 capsule by mouth 4 times daily for 4 days     oxyCODONE-acetaminophen 5-325 MG per tablet  Commonly known as:  Percocet  Take 1 tablet by mouth every 6 hours as needed for Pain for up to 4 days. Intended supply: 3 days. Take lowest dose possible to manage pain        CHANGE how you take these medications    levothyroxine 50 MCG tablet  Commonly known as:  SYNTHROID  TAKE 1 TABLET EVERY DAY  What changed:  See the new instructions.         CONTINUE taking these medications    vitamin D 25 MCG (1000 UT) Caps           Where to Get Your Medications      These medications were sent to [id number], [address] [place] [address], [address] [place] [address] Avenue    Phone:  [phone]   · cephALEXin 500 MG capsule     You can get these medications from any pharmacy    Bring a paper prescription for each of these medications  · oxyCODONE-acetaminophen 5-325 MG per tablet           Time Spent on discharge 40 minutes in the examination, evaluation, counseling and review of medications and discharge plan with the patient and/or caregiver. The patient Massiel Salazar was advised to consult their PCP/ or call 911 to proceed to nearest ED in the event if symptoms are not getting better and are getting worse . The note was completed using EMR. Every effort was made to ensure accuracy; however, inadvertent computerized transcription errors may be present. Electronically Signed:  Michi Puri DO   5/15/2020     Addendum to Resident H& P/Progress note:  I have personally seen,examined and evaluated the patient. I have reviewed the current history, physical findings, labs and assessment and plan and agree with note as documented by resident MD ( )  The patient underwent successful cystoscopy, right retrograde pyelogram, right ureteroscopy, laser lithotripsy, stone basket extraction, and placement of a 6 x 24 cm double-J stent on 5/14/2020. Postoperative condition is good. Serum creatinine is down to 1.0.   Follow-up with Dr. Stevie Jacobs in 7-10 days for a cystoscopy and right stent removal..  Blood pressure has normalized with good pain control, resolution of obstructive uropathy after right proximal ureteral stone removal    March Hodgkin, MD, [address]

## 2020-05-15 NOTE — PROGRESS NOTES
Patient A&Ox4, VSS. Patient voiding adequately, reports mild discomfort, controlled with Tylenol. Patient ambulating in room and hallways with stand by assist, tolerates well. Patient anticipating discharge home today. Will continue to monitor. (4) no impairment

## 2020-05-19 ENCOUNTER — TELEPHONE (OUTPATIENT)
Dept: INTERNAL MEDICINE CLINIC | Age: 70
End: 2020-05-19

## 2020-05-19 LAB
CALCULI COMPOSITION: NORMAL
MASS: 4 MG
STONE DESCRIPTION: NORMAL
STONE NUMBER: 2
STONE SIZE: NORMAL MM

## 2020-05-29 ENCOUNTER — OFFICE VISIT (OUTPATIENT)
Dept: INTERNAL MEDICINE CLINIC | Age: 70
End: 2020-05-29
Payer: COMMERCIAL

## 2020-05-29 VITALS
BODY MASS INDEX: 31.22 KG/M2 | OXYGEN SATURATION: 98 % | WEIGHT: 206 LBS | TEMPERATURE: 97.7 F | DIASTOLIC BLOOD PRESSURE: 88 MMHG | HEIGHT: 68 IN | HEART RATE: 89 BPM | SYSTOLIC BLOOD PRESSURE: 138 MMHG

## 2020-05-29 PROCEDURE — 1111F DSCHRG MED/CURRENT MED MERGE: CPT | Performed by: INTERNAL MEDICINE

## 2020-05-29 PROCEDURE — 99213 OFFICE O/P EST LOW 20 MIN: CPT | Performed by: INTERNAL MEDICINE

## 2020-05-29 ASSESSMENT — PATIENT HEALTH QUESTIONNAIRE - PHQ9
2. FEELING DOWN, DEPRESSED OR HOPELESS: 0
SUM OF ALL RESPONSES TO PHQ QUESTIONS 1-9: 0
SUM OF ALL RESPONSES TO PHQ QUESTIONS 1-9: 0
SUM OF ALL RESPONSES TO PHQ9 QUESTIONS 1 & 2: 0
1. LITTLE INTEREST OR PLEASURE IN DOING THINGS: 0

## 2020-05-29 ASSESSMENT — ENCOUNTER SYMPTOMS
NAUSEA: 0
BACK PAIN: 0
SHORTNESS OF BREATH: 0
ABDOMINAL PAIN: 0
EYE REDNESS: 0
CHEST TIGHTNESS: 0

## 2020-07-06 ENCOUNTER — OFFICE VISIT (OUTPATIENT)
Dept: ENT CLINIC | Age: 70
End: 2020-07-06
Payer: COMMERCIAL

## 2020-07-06 VITALS
TEMPERATURE: 98.4 F | BODY MASS INDEX: 31.95 KG/M2 | DIASTOLIC BLOOD PRESSURE: 89 MMHG | WEIGHT: 210.8 LBS | HEIGHT: 68 IN | HEART RATE: 93 BPM | SYSTOLIC BLOOD PRESSURE: 144 MMHG

## 2020-07-06 PROCEDURE — 99213 OFFICE O/P EST LOW 20 MIN: CPT | Performed by: OTOLARYNGOLOGY

## 2020-07-06 RX ORDER — NAPROXEN 500 MG/1
500 TABLET ORAL 2 TIMES DAILY WITH MEALS
Qty: 60 TABLET | Refills: 2 | Status: SHIPPED | OUTPATIENT
Start: 2020-07-06 | End: 2020-09-28 | Stop reason: ALTCHOICE

## 2020-09-28 ENCOUNTER — OFFICE VISIT (OUTPATIENT)
Dept: INTERNAL MEDICINE CLINIC | Age: 70
End: 2020-09-28
Payer: COMMERCIAL

## 2020-09-28 VITALS
SYSTOLIC BLOOD PRESSURE: 146 MMHG | WEIGHT: 208.2 LBS | DIASTOLIC BLOOD PRESSURE: 90 MMHG | BODY MASS INDEX: 31.55 KG/M2 | OXYGEN SATURATION: 96 % | HEART RATE: 96 BPM | TEMPERATURE: 97 F | HEIGHT: 68 IN

## 2020-09-28 DIAGNOSIS — Z00.00 WELL ADULT EXAM: ICD-10-CM

## 2020-09-28 DIAGNOSIS — Z12.5 SCREENING PSA (PROSTATE SPECIFIC ANTIGEN): ICD-10-CM

## 2020-09-28 DIAGNOSIS — E03.4 HYPOTHYROIDISM DUE TO ACQUIRED ATROPHY OF THYROID: ICD-10-CM

## 2020-09-28 PROCEDURE — 99397 PER PM REEVAL EST PAT 65+ YR: CPT | Performed by: INTERNAL MEDICINE

## 2020-09-28 ASSESSMENT — ENCOUNTER SYMPTOMS
NAUSEA: 0
EYE REDNESS: 0
BACK PAIN: 0
SHORTNESS OF BREATH: 0
CHEST TIGHTNESS: 0
ABDOMINAL PAIN: 0

## 2020-09-28 NOTE — PROGRESS NOTES
Subjective:      Patient ID: Meghann Record is a 69 y.o. male    Chief Complaint   Patient presents with    Annual Exam       HPI     Well check. He has noted some higher bp readings here , and better at home. 120/85 , 128/83. Current Outpatient Medications on File Prior to Visit   Medication Sig Dispense Refill    levothyroxine (SYNTHROID) 50 MCG tablet Take 1 tablet by mouth nightly 90 tablet 3    vitamin D 1000 UNITS CAPS Take 2,000 Units by mouth nightly        No current facility-administered medications on file prior to visit.         No Known Allergies    Past Medical History:   Diagnosis Date    Hyperlipidemia     Kidney stone 5/13/2020    Scalp lesion 2017    squamous cell Ca    Thyroid disease      Past Surgical History:   Procedure Laterality Date    COLONOSCOPY  4/4/2005    COLONOSCOPY  01/05/2018    small polyps 5-10 years adenomatousvs hyperplastic    CYSTOSCOPY Right 5/14/2020    CYSTOSCOPY, RIGHT RETROGRADE PYLOGRAM, RIGHT URETEROSCOPY, LASER, STONE MANIPULATION AND EXTRACTION AND RIGHT STENT INSERTION performed by Oz Larkin [facility] at [facility]  2016    Dr Aristeo Delarosa, scalp lesion , squamous cell cancer     SINUS SURGERY      TONSILLECTOMY       Social History     Socioeconomic History    Marital status: Single     Spouse name: Not on file    Number of children: Not on file    Years of education: Not on file    Highest education level: Not on file   Occupational History    Occupation:     Social Needs    Financial resource strain: Not on file    Food insecurity     Worry: Not on file     Inability: Not on file   [employer] needs     Medical: Not on file     Non-medical: Not on file   Tobacco Use    Smoking status: Never Smoker    Smokeless tobacco: Never Used   Substance and Sexual Activity    Alcohol use: No    Drug use: No    Sexual activity: Not on file   Lifestyle    Physical activity     Days per week: Not on file     Minutes per session: Not on file    Stress: Not on file   Relationships    Social connections     Talks on phone: Not on file     Gets together: Not on file     Attends Restoration service: Not on file     Active member of club or organization: Not on file     Attends meetings of clubs or organizations: Not on file     Relationship status: Not on file    Intimate partner violence     Fear of current or ex partner: Not on file     Emotionally abused: Not on file     Physically abused: Not on file     Forced sexual activity: Not on file   Other Topics Concern    Not on file   Social History Narrative    Not on file     Family History   Problem Relation Age of Onset    No Known Problems Mother     No Known Problems Father      Immunization History   Administered Date(s) Administered    Influenza Virus Vaccine 09/19/2013    Influenza Whole 10/14/2015    Influenza, High Dose (Fluzone 65 yrs and older) 10/02/2014, 10/13/2016, 10/01/2018, 09/22/2019, 09/06/2020    Tdap (Boostrix, Adacel) 09/19/2012       Review of Systems   Constitutional: Negative for fatigue, fever and unexpected weight change. HENT: Negative for hearing loss. Sore left tongue. He has had this time examined by the dermatologist and the ENT doctor and the have not elected to treat any particular abnormality there. He did have a biopsy of one area of the left lateral tongue. Eyes: Negative for redness and visual disturbance. Respiratory: Negative for chest tightness and shortness of breath. Cardiovascular: Negative for chest pain and palpitations. Gastrointestinal: Negative for abdominal pain and nausea. Endocrine: Negative for polydipsia and polyuria. Genitourinary: Negative for dysuria and hematuria. Musculoskeletal: Negative for arthralgias, back pain and neck pain.         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 Mixed hyperlipidemia  Stable. Check labs. 4. Screening PSA (prostate specific antigen)  Screening.  - Psa screening;  Future

## 2020-09-29 LAB
A/G RATIO: 2.4 (ref 1.1–2.2)
ALBUMIN SERPL-MCNC: 5 G/DL (ref 3.4–5)
ALP BLD-CCNC: 81 U/L (ref 40–129)
ALT SERPL-CCNC: 29 U/L (ref 10–40)
ANION GAP SERPL CALCULATED.3IONS-SCNC: 12 MMOL/L (ref 3–16)
AST SERPL-CCNC: 24 U/L (ref 15–37)
BASOPHILS ABSOLUTE: 0 K/UL (ref 0–0.2)
BASOPHILS RELATIVE PERCENT: 0.5 %
BILIRUB SERPL-MCNC: 0.7 MG/DL (ref 0–1)
BUN BLDV-MCNC: 16 MG/DL (ref 7–20)
CALCIUM SERPL-MCNC: 9.5 MG/DL (ref 8.3–10.6)
CHLORIDE BLD-SCNC: 103 MMOL/L (ref 99–110)
CHOLESTEROL, TOTAL: 166 MG/DL (ref 0–199)
CO2: 24 MMOL/L (ref 21–32)
CREAT SERPL-MCNC: 0.9 MG/DL (ref 0.8–1.3)
EOSINOPHILS ABSOLUTE: 0 K/UL (ref 0–0.6)
EOSINOPHILS RELATIVE PERCENT: 0.5 %
ESTIMATED AVERAGE GLUCOSE: 114 MG/DL
GFR AFRICAN AMERICAN: >60
GFR NON-AFRICAN AMERICAN: >60
GLOBULIN: 2.1 G/DL
GLUCOSE BLD-MCNC: 91 MG/DL (ref 70–99)
HBA1C MFR BLD: 5.6 %
HCT VFR BLD CALC: 45.6 % (ref 40.5–52.5)
HDLC SERPL-MCNC: 42 MG/DL (ref 40–60)
HEMOGLOBIN: 15.7 G/DL (ref 13.5–17.5)
LDL CHOLESTEROL CALCULATED: 74 MG/DL
LYMPHOCYTES ABSOLUTE: 2.5 K/UL (ref 1–5.1)
LYMPHOCYTES RELATIVE PERCENT: 31.8 %
MCH RBC QN AUTO: 33.5 PG (ref 26–34)
MCHC RBC AUTO-ENTMCNC: 34.3 G/DL (ref 31–36)
MCV RBC AUTO: 97.5 FL (ref 80–100)
MONOCYTES ABSOLUTE: 0.7 K/UL (ref 0–1.3)
MONOCYTES RELATIVE PERCENT: 8.8 %
NEUTROPHILS ABSOLUTE: 4.6 K/UL (ref 1.7–7.7)
NEUTROPHILS RELATIVE PERCENT: 58.4 %
PDW BLD-RTO: 14.1 % (ref 12.4–15.4)
PLATELET # BLD: 167 K/UL (ref 135–450)
PMV BLD AUTO: 8.6 FL (ref 5–10.5)
POTASSIUM SERPL-SCNC: 4.3 MMOL/L (ref 3.5–5.1)
PROSTATE SPECIFIC ANTIGEN: 3.52 NG/ML (ref 0–4)
RBC # BLD: 4.68 M/UL (ref 4.2–5.9)
SODIUM BLD-SCNC: 139 MMOL/L (ref 136–145)
T4 FREE: 1.1 NG/DL (ref 0.9–1.8)
TOTAL PROTEIN: 7.1 G/DL (ref 6.4–8.2)
TRIGL SERPL-MCNC: 249 MG/DL (ref 0–150)
TSH SERPL DL<=0.05 MIU/L-ACNC: 4.14 UIU/ML (ref 0.27–4.2)
VLDLC SERPL CALC-MCNC: 50 MG/DL
WBC # BLD: 7.9 K/UL (ref 4–11)

## 2020-11-20 RX ORDER — NAPROXEN 500 MG/1
500 TABLET ORAL 2 TIMES DAILY WITH MEALS
Qty: 60 TABLET | Refills: 0 | Status: SHIPPED | OUTPATIENT
Start: 2020-11-20 | End: 2021-03-01 | Stop reason: ALTCHOICE

## 2021-03-01 ENCOUNTER — OFFICE VISIT (OUTPATIENT)
Dept: ENT CLINIC | Age: 71
End: 2021-03-01
Payer: COMMERCIAL

## 2021-03-01 VITALS
SYSTOLIC BLOOD PRESSURE: 131 MMHG | WEIGHT: 203.2 LBS | DIASTOLIC BLOOD PRESSURE: 86 MMHG | HEIGHT: 68 IN | BODY MASS INDEX: 30.8 KG/M2 | TEMPERATURE: 97.3 F | HEART RATE: 90 BPM

## 2021-03-01 DIAGNOSIS — K14.8 LESION OF TONGUE: Primary | ICD-10-CM

## 2021-03-01 PROCEDURE — 41100 BIOPSY OF TONGUE: CPT | Performed by: OTOLARYNGOLOGY

## 2021-03-01 PROCEDURE — 99212 OFFICE O/P EST SF 10 MIN: CPT | Performed by: OTOLARYNGOLOGY

## 2021-03-01 NOTE — PROGRESS NOTES
FOLLOW UP VISIT:    CHIEF COMPLAINT: Lesion of the tongue. INTERIM HISTORY: Long history of lesions of the oral tongue which have been biopsied in the past and found to be nonmalignant. In the past few months he has gotten some new lesions in the oral tongue. They are slightly painful. They do not bleed. He has never been a smoker. PAST MEDICAL HISTORY:   Social History     Tobacco Use   Smoking Status Never Smoker   Smokeless Tobacco Never Used                                                    Social History     Substance and Sexual Activity   Alcohol Use No                                                    Current Outpatient Medications:     levothyroxine (SYNTHROID) 50 MCG tablet, Take 1 tablet by mouth nightly, Disp: 90 tablet, Rfl: 3    vitamin D 1000 UNITS CAPS, Take 2,000 Units by mouth nightly , Disp: , Rfl:                                                  Past Medical History:   Diagnosis Date    Hyperlipidemia     Kidney stone 5/13/2020    Scalp lesion 2017    squamous cell Ca    Thyroid disease                                                     Past Surgical History:   Procedure Laterality Date    COLONOSCOPY  4/4/2005    COLONOSCOPY  01/05/2018    small polyps 5-10 years adenomatousvs hyperplastic    CYSTOSCOPY Right 5/14/2020    CYSTOSCOPY, RIGHT RETROGRADE PYLOGRAM, RIGHT URETEROSCOPY, LASER, STONE MANIPULATION AND EXTRACTION AND RIGHT STENT INSERTION performed by Cris Higgins MD at [facility]  2016    Dr Anupama Kaba, scalp lesion , squamous cell cancer     SINUS SURGERY      TONSILLECTOMY         FAMILY HISTORY: Family history reviewed and except as pertinent to the interim history is not contributory. REVIEW OF SYSTEMS:  All pertinent positive and negative findings included in HPI. Otherwise, all other systems are reviewed and are negative    PHYSICAL EXAMINATION:   GENERAL: wdwn- no acute distress  RESPIRATORY: No stridor.   COMMUNICATION :  Normal voice  MENTAL STATUS: Alert and oriented x3  HEAD AND FACE: No skin lesions of the face. EXTERNAL EARS AND NOSE: The external ears and nasal pyramid are normal.  FACIAL MUSCLES: All branches of the facial nerve intact. FACE PALPATION: Zygomatic arches and orbital rims are intact. No tenderness over the sinuses. EXTRAOCULAR MUSCLES: Intact with full range of motion. OTOSCOPY:  Normal tympanic membranes, middle ear spaces, and external auditory canals. TUNING FORKS:  Rinne ++ Landers midline at 512 Hz  INTRANASAL:  Septum midline, turbinates normal, meati clear. 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 There are 2 areas of elevated tissue  NECK:  No masses. LYMPH NODES: No cervical lymphadenopathy. SALIVARY GLANDS: Parotid and submandibular glands normal.  THYROID: No goiter or thyroid nodules palpable. IMPRESSION: Lesions of the oral tongue. OPERATIVE NOTE    PREOPERATIVE DIAGNOSIS: 2 Lesions of oral tongue. POSTOPERATIVE DIAGNOSIS: Same    PROCEDURE: Excise 2 lesions of oral tongue. SURGEON:  Alter Dryden    ANAESTHESIA: Lidocaine 1% with epinephrine 1: 100,000. FINDINGS: Mucosa of the tongue surrounding the 2 elevated lesions topically anesthetized with lidocaine and then injected with local anesthetic. Each lesions excised with the scissors flush with the mucosa of the tongue and sent for permanent pathologic section. Silver nitrate applied to the base of each excision site for hemostasis. FOLLOW UP: 5 days.

## 2021-03-05 ENCOUNTER — OFFICE VISIT (OUTPATIENT)
Dept: ENT CLINIC | Age: 71
End: 2021-03-05

## 2021-03-05 VITALS — TEMPERATURE: 96.5 F

## 2021-03-05 DIAGNOSIS — K14.8 LESION OF TONGUE: Primary | ICD-10-CM

## 2021-03-05 PROCEDURE — 99024 POSTOP FOLLOW-UP VISIT: CPT | Performed by: OTOLARYNGOLOGY

## 2021-03-05 NOTE — PROGRESS NOTES
4 days following biopsy of lesions of the left lateral oral tongue. Pathology shows squamous cell atypia. After discussion with patient, agreed to proceed to the operating room for a more extensive excision. Patient accepts the risk of tongue swelling and bleeding. Follow-up: At surgery.

## 2021-03-09 ENCOUNTER — TELEPHONE (OUTPATIENT)
Dept: ENT CLINIC | Age: 71
End: 2021-03-09

## 2021-03-10 ENCOUNTER — OFFICE VISIT (OUTPATIENT)
Dept: INTERNAL MEDICINE CLINIC | Age: 71
End: 2021-03-10
Payer: COMMERCIAL

## 2021-03-10 VITALS
OXYGEN SATURATION: 97 % | TEMPERATURE: 98 F | DIASTOLIC BLOOD PRESSURE: 80 MMHG | BODY MASS INDEX: 30.95 KG/M2 | WEIGHT: 204.2 LBS | HEIGHT: 68 IN | HEART RATE: 99 BPM | SYSTOLIC BLOOD PRESSURE: 140 MMHG

## 2021-03-10 DIAGNOSIS — K14.8 LESION OF TONGUE: ICD-10-CM

## 2021-03-10 DIAGNOSIS — E03.4 HYPOTHYROIDISM DUE TO ACQUIRED ATROPHY OF THYROID: ICD-10-CM

## 2021-03-10 DIAGNOSIS — Z01.818 PRE-OP EXAM: Primary | ICD-10-CM

## 2021-03-10 PROCEDURE — 99243 OFF/OP CNSLTJ NEW/EST LOW 30: CPT | Performed by: INTERNAL MEDICINE

## 2021-03-10 ASSESSMENT — PATIENT HEALTH QUESTIONNAIRE - PHQ9
SUM OF ALL RESPONSES TO PHQ QUESTIONS 1-9: 0
2. FEELING DOWN, DEPRESSED OR HOPELESS: 0
1. LITTLE INTEREST OR PLEASURE IN DOING THINGS: 0

## 2021-03-10 NOTE — PROGRESS NOTES
Chief Complaint   Patient presents with    Pre-op Exam     surgery on tongue on 3/19/2021 Dr. Boby Guzman is a 69 y.o. male who presents for a preoperative physical examination. He is scheduled to have  done by Dr. Laney Eid at [facility] on 3/19/21. History of Present Illness:      Lor Ziegler has had a left posterior tongue lesion for several years. Dr. Solomon Waldron wants to remove the lesion. He has lost some weight voluntarily since the first of the year. Past Medical History:   Diagnosis Date    Hyperlipidemia     Kidney stone 5/13/2020    Scalp lesion 2017    squamous cell Ca    Thyroid disease         Review of patient's past surgical history indicates:     Past Surgical History:   Procedure Laterality Date    COLONOSCOPY  4/4/2005    COLONOSCOPY  01/05/2018    small polyps 5-10 years adenomatousvs hyperplastic    CYSTOSCOPY Right 5/14/2020    CYSTOSCOPY, RIGHT RETROGRADE PYLOGRAM, RIGHT URETEROSCOPY, LASER, STONE MANIPULATION AND EXTRACTION AND RIGHT STENT INSERTION performed by Silverio Dowling MD at [facility]  2016    Dr Frida Silva, scalp lesion , squamous cell cancer     SINUS SURGERY      TONSILLECTOMY                                                     Current Outpatient Medications   Medication Sig Dispense Refill    levothyroxine (SYNTHROID) 50 MCG tablet Take 1 tablet by mouth nightly 90 tablet 3    vitamin D 1000 UNITS CAPS Take 2,000 Units by mouth nightly        No current facility-administered medications for this visit.         Allergies   Allergen Reactions    Pcn [Penicillins]        Social History     Tobacco Use    Smoking status: Never Smoker    Smokeless tobacco: Never Used   Substance Use Topics    Alcohol use: No        Family History   Problem Relation Age of Onset    No Known Problems Mother     No Known Problems Father         Review Of Systems    Skin: no abnormal pigmentation, rash, scaling, itching, masses, hair or nail changes  Eyes: negative  Ears/Nose/Throat: left tongue lesion   Respiratory: negative  Cardiovascular: negative  Gastrointestinal: negative  Genitourinary: negative  Musculoskeletal: negative  Neurologic: negative  Psychiatric: negative  Hematologic/Lymphatic/Immunologic: negative  Endocrine: negative    PHYSICAL EXAMINATION:  BP (!) 140/80   Pulse 99   Temp 98 °F (36.7 °C)   Ht 5' 8\" (1.727 m)   Wt 204 lb 3.2 oz (92.6 kg)   SpO2 97%   BMI 31.05 kg/m²   General appearance - healthy, alert, no distress  Skin - Skin color, texture, turgor normal. No rashes or lesions. Head - Normocephalic. No masses, lesions, tenderness or abnormalities  Eyes - conjunctivae/corneas clear. PERRL, EOM's intact. Ears - External ears normal. Canals clear. TM's normal.  Nose/Sinuses - Nares normal. Septum midline. Mucosa normal. No drainage or sinus tenderness. Oropharynx - left tongue lesion   Neck - Neck supple. No adenopathy. Thyroid symmetric, normal size,  Back - Back symmetric, no curvature. ROM normal. No CVA tenderness. Lungs - Percussion normal. Good diaphragmatic excursion. Lungs clear  Heart - Regular rate and rhythm, with no rub, murmur or gallop noted. Abdomen - Abdomen soft, non-tender. BS normal. No masses, organomegaly  Extremities - Extremities normal. No deformities, edema, or skin discolora  Musculoskeletal - Spine ROM normal. Muscular strength intact. Peripheral pulses - radial=4/4,, femoral=4/4, popliteal=4/4, dorsalis pedis=4/4,  Neuro - Gait normal. Reflexes normal and symmetric. Sensation grossly normal.  No focal weakness      ASSESSMENT and PLAN:    1. Pre-op exam  Stable minimal sytemic disease and low risk surgery. 2. Lesion of tongue  Unimproved tongue lesion. 3. Hypothyroidism due to acquired atrophy of thyroid  Stable. Continue thyroid medicine. He is medically cleared for surgery and anesthesia. Copy to Dr Sarah Curry.  [place]

## 2021-03-12 ENCOUNTER — OFFICE VISIT (OUTPATIENT)
Dept: ENT CLINIC | Age: 71
End: 2021-03-12
Payer: COMMERCIAL

## 2021-03-12 VITALS
HEIGHT: 66 IN | DIASTOLIC BLOOD PRESSURE: 81 MMHG | BODY MASS INDEX: 32.56 KG/M2 | TEMPERATURE: 97 F | HEART RATE: 77 BPM | SYSTOLIC BLOOD PRESSURE: 139 MMHG | WEIGHT: 202.6 LBS

## 2021-03-12 DIAGNOSIS — K14.8 LESION OF TONGUE: Primary | ICD-10-CM

## 2021-03-12 PROCEDURE — 99212 OFFICE O/P EST SF 10 MIN: CPT | Performed by: OTOLARYNGOLOGY

## 2021-03-12 NOTE — PROGRESS NOTES
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 Biopsy 2 weeks ago showed squamous cell with atypia. Scheduled for partial glossectomy in the operating room under general anesthesia. Patient has few questions about the procedure. PAST MEDICAL HISTORY:   Social History     Tobacco Use   Smoking Status Never Smoker   Smokeless Tobacco Never Used                                                    Social History     Substance and Sexual Activity   Alcohol Use No                                                    Current Outpatient Medications:     levothyroxine (SYNTHROID) 50 MCG tablet, Take 1 tablet by mouth nightly, Disp: 90 tablet, Rfl: 3    vitamin D 1000 UNITS CAPS, Take 2,000 Units by mouth nightly , Disp: , Rfl:                                                  Past Medical History:   Diagnosis Date    Hyperlipidemia     Kidney stone 5/13/2020    Scalp lesion 2017    squamous cell Ca    Thyroid disease                                                     Past Surgical History:   Procedure Laterality Date    COLONOSCOPY  4/4/2005    COLONOSCOPY  01/05/2018    small polyps 5-10 years adenomatousvs hyperplastic    CYSTOSCOPY Right 5/14/2020    CYSTOSCOPY, RIGHT RETROGRADE PYLOGRAM, RIGHT URETEROSCOPY, LASER, STONE MANIPULATION AND EXTRACTION AND RIGHT STENT INSERTION performed by Walker Hernandez MD at [address] [place] [address]  2016    Dr Angelito Isaac, scalp lesion , squamous cell cancer     SINUS SURGERY      TONSILLECTOMY         FAMILY HISTORY: Family history reviewed and except as pertinent to the interim history is not contributory. REVIEW OF SYSTEMS:  All pertinent positive and negative findings included in HPI. Otherwise, all other systems are reviewed and are negative    PHYSICAL EXAMINATION:   GENERAL: wdwn- no acute distress  RESPIRATORY: No stridor.   COMMUNICATION :  Normal voice MENTAL STATUS: Alert and oriented x3  HEAD AND FACE: No skin lesions of the face. EXTERNAL EARS AND NOSE: The external ears and nasal pyramid are normal.  FACIAL MUSCLES: All branches of the facial nerve intact. EXTRAOCULAR MUSCLES: Intact with full range of motion. PHARYNX: Leukoplakia of the lateral border of the tongue on the left. No induration. Tongue mobility is not impaired. NECK:  No masses. LYMPH NODES: No cervical lymphadenopathy. SALIVARY GLANDS: Parotid and submandibular glands normal.  THYROID: No goiter or thyroid nodules palpable. IMPRESSION: Leukoplakia of the tongue with biopsy of the indeterminate. PLAN: Patient's questions about surgery answered. To the OR next week. FOLLOW-UP: Surgery.

## 2021-03-15 ENCOUNTER — OFFICE VISIT (OUTPATIENT)
Dept: PRIMARY CARE CLINIC | Age: 71
End: 2021-03-15
Payer: COMMERCIAL

## 2021-03-15 DIAGNOSIS — Z01.818 PREOP EXAMINATION: Primary | ICD-10-CM

## 2021-03-15 LAB — SARS-COV-2: NOT DETECTED

## 2021-03-15 PROCEDURE — G8417 CALC BMI ABV UP PARAM F/U: HCPCS | Performed by: NURSE PRACTITIONER

## 2021-03-15 PROCEDURE — G8428 CUR MEDS NOT DOCUMENT: HCPCS | Performed by: NURSE PRACTITIONER

## 2021-03-15 PROCEDURE — 99211 OFF/OP EST MAY X REQ PHY/QHP: CPT | Performed by: NURSE PRACTITIONER

## 2021-03-16 NOTE — PROGRESS NOTES
[facility] patients having surgery or anesthesia are required to be Covid tested. You will need to quarantine from the time you are tested until your surgery. PRIOR TO PROCEDURE DATE:        1. PLEASE FOLLOW ANY  GUIDELINES/ INSTRUCTIONS PRIOR TO YOUR PROCEDURE AS ADVISED BY YOUR SURGEON. 2. Arrange for someone to drive you home and be with you for the first 24 hours after discharge for your safety after your procedure for which you received sedation. Ensure it is someone we can share information with regarding your discharge. 3. You must contact your surgeon for instructions IF:   You are taking any blood thinners, aspirin, anti-inflammatory or vitamin E.   There is a change in your physical condition such as a cold, fever, rash, cuts, sores or any other infection, especially near your surgical site. 4. Do not drink alcohol the day before or day of your procedure. 5. A Pre-op History and Physical for surgery MUST be completed by your Physician or Urgent Care within 30 days of your procedure date. Please bring a copy with you on the day of your procedure and along with any other testing performed. THE DAY OF YOUR PROCEDURE:  1. Follow instructions for ARRIVAL TIME as DIRECTED BY YOUR SURGEON. I    1. Enter the MAIN entrance from [address] and follow the signs to the free [place] or [place] parking (offered free of charge 6am-5pm). 2. Enter the Main Entrance of the hospital (do not enter from the lower level of the parking garage). Upon entrance, check in with the  at the main desk on your left. If no one is available at the desk, proceed into the [facility] Waiting Room and go through the door directly into the [facility]. There is a Check-in desk ACROSS from Room 5 (marked with a sign hanging from the ceiling). The phone number for the surgery center is [phone]. 4. Please call [phone] option #2 option #2 if you have not been preregistered yet. On the day of your procedure bring your insurance card and photo ID. You will be registered at your bedside once brought back to your room. 5. DO NOT EAT ANYTHING eight hours prior to your arrival for surgery. May have 8 ounces of water 4 hours prior to your arrival for surgery. NOTE: ALL Gastric, Bariatric and Bowel surgery patients MUST follow their surgeon's instructions. 6. MEDICATIONS    Take the following medications with a SMALL sip of water: Thyroid   Use your usual dose of inhalers the morning of surgery. BRING your rescue inhaler with you to hospital.    Anesthesia does NOT want you to take insulin the morning of surgery. They will control your blood sugar while you are at the hospital. Please contact your ordering physician for instructions regarding your insulin the night before your procedure. If you have an insulin pump, please keep it set on basal rate. 7. Do not swallow water when brushing teeth. No gum, candy, mints or ice chips. Refrain from smoking or at least decrease the amount. 8. Dress in loose, comfortable clothing appropriate for redressing after your procedure. Do not wear jewelry (including body piercings), make-up (especially NO eye make-up), fingernail polish (NO toenail polish if foot/leg surgery), lotion, powders or metal hairclips. 9. Dentures, glasses, or contacts will need to be removed before your procedure. Bring cases for your glasses, contacts, dentures, or hearing aids to protect them while you are in surgery. 10. If you use a CPAP, please bring it with you on the day of your procedure. 11. We recommend that valuable personal  belongings such as cash, cell phones, e-tablets or jewelry, be left at home during your stay. The hospital will not be responsible for valuables that are not secured in the hospital safe.  However, if your insurance requires a co-pay, you may want to bring a method of payment, i.e. Check or credit card, if you wish to pay your co-pay the day of surgery. 12. If you are to stay overnight, you may bring a bag with personal items. Please have any large items you may need brought in by your family after your arrival to your hospital room. 15. If you have a Living Will or Durable Power of , please bring a copy on the day of your procedure. 15. With your permission, one family member may accompany you while you are being prepared for surgery. Once you are ready, additional family members may join you. HOW WE KEEP YOU SAFE and WORK TO PREVENT SURGICAL SITE INFECTIONS:  1. Health care workers should always check your ID bracelet to verify your name and birth date. You will be asked many times to state your name, date of birth, and allergies. 2. Health care workers should always clean their hands with soap or alcohol gel before providing care to you. It is okay to ask anyone if they cleaned their hands before they touch you. 3. You will be actively involved in verifying the type of procedure you are having and ensuring the correct surgical site. This will be confirmed multiple times prior to your procedure. Do NOT benny your surgery site UNLESS instructed to by your surgeon. 4. Do not shave or wax for 72 hours prior to procedure near your operative site. Shaving with a razor can irritate your skin and make it easier to develop an infection. On the day of your procedure, any hair that needs to be removed near the surgical site will be clipped by a healthcare worker using a special clippers designed to avoid skin irritation. 5. When you are in the operating room, your surgical site will be cleansed with a special soap, and in most cases, you will be given an antibiotic before the surgery begins. What to expect AFTER YOUR PROCEDURE:  1.  Immediately following your procedure, your will be taken to the PACU for the first phase of your recovery. Your nurse will help you recover from any potential side effects of anesthesia, such as extreme drowsiness, changes in your vital signs or breathing patterns. Nausea, headache, muscle aches, or sore throat may also occur after anesthesia. Your nurse will help you manage these potential side effects. 2. For comfort and safety, arrange to have someone at home with you for the first 24 hours after discharge. 3. You and your family will be given written instructions about your diet, activity, dressing care, medications, and return visits. 4. Once at home, should issues with nausea, pain, or bleeding occur, or should you notice any signs of infection, you should call your surgeon. 5. Always clean your hands before and after caring for your wound. Do not let your family touch your surgery site without cleaning their hands. 6. Narcotic pain medications can cause significant constipation. You may want to add a stool softener to your postoperative medication schedule or speak to your surgeon on how best to manage this SIDE EFFECT. SPECIAL INSTRUCTIONS     Thank you for allowing us to care for you. We strive to exceed your expectations in the delivery of care and service provided to you and your family. If you need to contact the [facility]  staff for any reason, please call us at [phone]    Instructions reviewed with patient during preadmission testing phone interview. Kari Ladd. 3/16/2021 .4:49 PM      ADDITIONAL EDUCATIONAL INFORMATION REVIEWED PER PHONE WITH YOU AND/OR YOUR FAMILY:

## 2021-03-18 ENCOUNTER — ANESTHESIA EVENT (OUTPATIENT)
Dept: OPERATING ROOM | Age: 71
End: 2021-03-18
Payer: COMMERCIAL

## 2021-03-18 NOTE — PROGRESS NOTES
[facility] [email or web address] / [facility] ([facility]) [address] [facility], [address]    Acknowledgment of Informed Consent for Surgical or Medical Procedure and Sedation  I agree to allow doctor(s) AMBERLY SAENZ and his/her associates or assistants, including residents and/or other qualified medical practitioner to perform the following medical treatment or procedure and to administer or direct the administration of sedation as necessary:  Procedure(s): EXCISE LESION LEFT ORAL TONGUE      My doctor has explained the following regarding the proposed procedure:   the explanation of the procedure   the benefits of the procedure   the potential problems that might occur during recuperation   the risks and side effects of the procedure which could include but are not limited to severe blood loss, infection, stroke or death   the benefits, risks and side effect of alternative procedures including the consequences of declining this procedure or any alternative procedures   the likelihood of achieving satisfactory results. I acknowledge no guarantee or assurance has been made to me regarding the results. I understand that during the course of this treatment/procedure, unforeseen conditions can occur which require an additional or different procedure. I agree to allow my physician or assistants to perform such extension of the original procedure as they may find necessary. I understand that sedation will often result in temporary impairment of memory and fine motor skills and that sedation can occasionally progress to a state of deep sedation or general anesthesia. I understand the risks of anesthesia for surgery include, but are not limited to, sore throat, hoarseness, injury to face, mouth, or teeth; nausea; headache; injury to blood vessels or nerves; death, brain damage, or paralysis.     I understand that if I have a Limitation of Treatment order in effect during my hospitalization, the order may or may not be in effect during this procedure. I give my doctor permission to give me blood or blood products. I understand that there are risks with receiving blood such as hepatitis, AIDS, fever, or allergic reaction. I acknowledge that the risks, benefits, and alternatives of this treatment have been explained to me and that no express or implied warranty has been given by the hospital, any blood bank, or any person or entity as to the blood or blood components transfused. At the discretion of my doctor, I agree to allow observers, equipment/product representatives and allow photographing, and/or televising of the procedure, provided my name or identity is maintained confidentially. I agree the hospital may dispose of or use for scientific or educational purposes any tissue, fluid, or body parts which may be removed.     ________________________________Date________Time______ am/pm  ([place] [facility])  Patient or Signature of Closest Relative or Legal Guardian    ________________________________Date________Time______am/pm      Page 1 of  1  Witness

## 2021-03-19 ENCOUNTER — HOSPITAL ENCOUNTER (OUTPATIENT)
Age: 71
Setting detail: OBSERVATION
Discharge: HOME | End: 2021-03-20
Attending: OTOLARYNGOLOGY | Admitting: OTOLARYNGOLOGY
Payer: COMMERCIAL

## 2021-03-19 ENCOUNTER — ANESTHESIA (OUTPATIENT)
Dept: OPERATING ROOM | Age: 71
End: 2021-03-19
Payer: COMMERCIAL

## 2021-03-19 VITALS — DIASTOLIC BLOOD PRESSURE: 67 MMHG | SYSTOLIC BLOOD PRESSURE: 115 MMHG | OXYGEN SATURATION: 91 %

## 2021-03-19 DIAGNOSIS — K14.8 LESION OF TONGUE: ICD-10-CM

## 2021-03-19 PROBLEM — D37.02 NEOPLASM OF UNCERTAIN BEHAVIOR OF ANTERIOR TWO-THIRDS OF TONGUE: Status: ACTIVE | Noted: 2021-03-19

## 2021-03-19 PROCEDURE — 2709999900 HC NON-CHARGEABLE SUPPLY: Performed by: OTOLARYNGOLOGY

## 2021-03-19 PROCEDURE — 3700000000 HC ANESTHESIA ATTENDED CARE: Performed by: OTOLARYNGOLOGY

## 2021-03-19 PROCEDURE — 88342 IMHCHEM/IMCYTCHM 1ST ANTB: CPT

## 2021-03-19 PROCEDURE — 7100000001 HC PACU RECOVERY - ADDTL 15 MIN: Performed by: OTOLARYNGOLOGY

## 2021-03-19 PROCEDURE — 3600000003 HC SURGERY LEVEL 3 BASE: Performed by: OTOLARYNGOLOGY

## 2021-03-19 PROCEDURE — 3600000013 HC SURGERY LEVEL 3 ADDTL 15MIN: Performed by: OTOLARYNGOLOGY

## 2021-03-19 PROCEDURE — 2580000003 HC RX 258: Performed by: OTOLARYNGOLOGY

## 2021-03-19 PROCEDURE — 2500000003 HC RX 250 WO HCPCS: Performed by: NURSE ANESTHETIST, CERTIFIED REGISTERED

## 2021-03-19 PROCEDURE — G0378 HOSPITAL OBSERVATION PER HR: HCPCS

## 2021-03-19 PROCEDURE — 2580000003 HC RX 258: Performed by: ANESTHESIOLOGY

## 2021-03-19 PROCEDURE — 3700000001 HC ADD 15 MINUTES (ANESTHESIA): Performed by: OTOLARYNGOLOGY

## 2021-03-19 PROCEDURE — 88309 TISSUE EXAM BY PATHOLOGIST: CPT

## 2021-03-19 PROCEDURE — 7100000000 HC PACU RECOVERY - FIRST 15 MIN: Performed by: OTOLARYNGOLOGY

## 2021-03-19 PROCEDURE — 2500000003 HC RX 250 WO HCPCS: Performed by: OTOLARYNGOLOGY

## 2021-03-19 PROCEDURE — 41120 PARTIAL REMOVAL OF TONGUE: CPT | Performed by: OTOLARYNGOLOGY

## 2021-03-19 PROCEDURE — 6360000002 HC RX W HCPCS: Performed by: NURSE ANESTHETIST, CERTIFIED REGISTERED

## 2021-03-19 RX ORDER — ONDANSETRON 4 MG/1
4 TABLET, ORALLY DISINTEGRATING ORAL EVERY 8 HOURS PRN
Status: DISCONTINUED | OUTPATIENT
Start: 2021-03-19 | End: 2021-03-20 | Stop reason: HOSPADM

## 2021-03-19 RX ORDER — ROCURONIUM BROMIDE 10 MG/ML
INJECTION, SOLUTION INTRAVENOUS PRN
Status: DISCONTINUED | OUTPATIENT
Start: 2021-03-19 | End: 2021-03-19 | Stop reason: SDUPTHER

## 2021-03-19 RX ORDER — OXYCODONE HYDROCHLORIDE 5 MG/1
10 TABLET ORAL EVERY 4 HOURS PRN
Status: DISCONTINUED | OUTPATIENT
Start: 2021-03-19 | End: 2021-03-20 | Stop reason: HOSPADM

## 2021-03-19 RX ORDER — VITAMIN B COMPLEX
2000 TABLET ORAL NIGHTLY
Status: DISCONTINUED | OUTPATIENT
Start: 2021-03-19 | End: 2021-03-20 | Stop reason: HOSPADM

## 2021-03-19 RX ORDER — SODIUM CHLORIDE 0.9 % (FLUSH) 0.9 %
10 SYRINGE (ML) INJECTION EVERY 12 HOURS SCHEDULED
Status: DISCONTINUED | OUTPATIENT
Start: 2021-03-19 | End: 2021-03-20 | Stop reason: HOSPADM

## 2021-03-19 RX ORDER — MAGNESIUM HYDROXIDE 1200 MG/15ML
LIQUID ORAL CONTINUOUS PRN
Status: COMPLETED | OUTPATIENT
Start: 2021-03-19 | End: 2021-03-19

## 2021-03-19 RX ORDER — ENALAPRILAT 2.5 MG/2ML
1.25 INJECTION INTRAVENOUS
Status: DISCONTINUED | OUTPATIENT
Start: 2021-03-19 | End: 2021-03-19 | Stop reason: HOSPADM

## 2021-03-19 RX ORDER — LIDOCAINE HYDROCHLORIDE 20 MG/ML
INJECTION, SOLUTION INTRAVENOUS PRN
Status: DISCONTINUED | OUTPATIENT
Start: 2021-03-19 | End: 2021-03-19 | Stop reason: SDUPTHER

## 2021-03-19 RX ORDER — MORPHINE SULFATE 2 MG/ML
4 INJECTION, SOLUTION INTRAMUSCULAR; INTRAVENOUS
Status: DISCONTINUED | OUTPATIENT
Start: 2021-03-19 | End: 2021-03-20 | Stop reason: HOSPADM

## 2021-03-19 RX ORDER — PROPOFOL 10 MG/ML
INJECTION, EMULSION INTRAVENOUS PRN
Status: DISCONTINUED | OUTPATIENT
Start: 2021-03-19 | End: 2021-03-19 | Stop reason: SDUPTHER

## 2021-03-19 RX ORDER — SODIUM CHLORIDE 0.9 % (FLUSH) 0.9 %
10 SYRINGE (ML) INJECTION PRN
Status: DISCONTINUED | OUTPATIENT
Start: 2021-03-19 | End: 2021-03-19 | Stop reason: HOSPADM

## 2021-03-19 RX ORDER — FENTANYL CITRATE 50 UG/ML
50 INJECTION, SOLUTION INTRAMUSCULAR; INTRAVENOUS EVERY 5 MIN PRN
Status: DISCONTINUED | OUTPATIENT
Start: 2021-03-19 | End: 2021-03-19 | Stop reason: HOSPADM

## 2021-03-19 RX ORDER — MORPHINE SULFATE 2 MG/ML
2 INJECTION, SOLUTION INTRAMUSCULAR; INTRAVENOUS
Status: DISCONTINUED | OUTPATIENT
Start: 2021-03-19 | End: 2021-03-20 | Stop reason: HOSPADM

## 2021-03-19 RX ORDER — SODIUM CHLORIDE, SODIUM LACTATE, POTASSIUM CHLORIDE, CALCIUM CHLORIDE 600; 310; 30; 20 MG/100ML; MG/100ML; MG/100ML; MG/100ML
INJECTION, SOLUTION INTRAVENOUS CONTINUOUS
Status: DISCONTINUED | OUTPATIENT
Start: 2021-03-19 | End: 2021-03-19

## 2021-03-19 RX ORDER — FENTANYL CITRATE 50 UG/ML
25 INJECTION, SOLUTION INTRAMUSCULAR; INTRAVENOUS EVERY 5 MIN PRN
Status: DISCONTINUED | OUTPATIENT
Start: 2021-03-19 | End: 2021-03-19 | Stop reason: HOSPADM

## 2021-03-19 RX ORDER — SODIUM CHLORIDE, SODIUM LACTATE, POTASSIUM CHLORIDE, CALCIUM CHLORIDE 600; 310; 30; 20 MG/100ML; MG/100ML; MG/100ML; MG/100ML
INJECTION, SOLUTION INTRAVENOUS CONTINUOUS
Status: DISCONTINUED | OUTPATIENT
Start: 2021-03-19 | End: 2021-03-20 | Stop reason: HOSPADM

## 2021-03-19 RX ORDER — OXYCODONE HYDROCHLORIDE 5 MG/1
5 TABLET ORAL EVERY 4 HOURS PRN
Status: DISCONTINUED | OUTPATIENT
Start: 2021-03-19 | End: 2021-03-20 | Stop reason: HOSPADM

## 2021-03-19 RX ORDER — LIDOCAINE HYDROCHLORIDE AND EPINEPHRINE 10; 10 MG/ML; UG/ML
INJECTION, SOLUTION INFILTRATION; PERINEURAL PRN
Status: DISCONTINUED | OUTPATIENT
Start: 2021-03-19 | End: 2021-03-19 | Stop reason: HOSPADM

## 2021-03-19 RX ORDER — LIDOCAINE HYDROCHLORIDE 10 MG/ML
1 INJECTION, SOLUTION EPIDURAL; INFILTRATION; INTRACAUDAL; PERINEURAL
Status: DISCONTINUED | OUTPATIENT
Start: 2021-03-19 | End: 2021-03-19 | Stop reason: HOSPADM

## 2021-03-19 RX ORDER — DEXAMETHASONE SODIUM PHOSPHATE 4 MG/ML
INJECTION, SOLUTION INTRA-ARTICULAR; INTRALESIONAL; INTRAMUSCULAR; INTRAVENOUS; SOFT TISSUE PRN
Status: DISCONTINUED | OUTPATIENT
Start: 2021-03-19 | End: 2021-03-19 | Stop reason: SDUPTHER

## 2021-03-19 RX ORDER — ONDANSETRON 2 MG/ML
4 INJECTION INTRAMUSCULAR; INTRAVENOUS
Status: DISCONTINUED | OUTPATIENT
Start: 2021-03-19 | End: 2021-03-19 | Stop reason: HOSPADM

## 2021-03-19 RX ORDER — ONDANSETRON 2 MG/ML
4 INJECTION INTRAMUSCULAR; INTRAVENOUS EVERY 6 HOURS PRN
Status: DISCONTINUED | OUTPATIENT
Start: 2021-03-19 | End: 2021-03-20 | Stop reason: HOSPADM

## 2021-03-19 RX ORDER — ONDANSETRON 2 MG/ML
INJECTION INTRAMUSCULAR; INTRAVENOUS PRN
Status: DISCONTINUED | OUTPATIENT
Start: 2021-03-19 | End: 2021-03-19 | Stop reason: SDUPTHER

## 2021-03-19 RX ORDER — SUCCINYLCHOLINE/SOD CL,ISO/PF 200MG/10ML
SYRINGE (ML) INTRAVENOUS PRN
Status: DISCONTINUED | OUTPATIENT
Start: 2021-03-19 | End: 2021-03-19

## 2021-03-19 RX ORDER — FENTANYL CITRATE 50 UG/ML
INJECTION, SOLUTION INTRAMUSCULAR; INTRAVENOUS PRN
Status: DISCONTINUED | OUTPATIENT
Start: 2021-03-19 | End: 2021-03-19 | Stop reason: SDUPTHER

## 2021-03-19 RX ORDER — SODIUM CHLORIDE 0.9 % (FLUSH) 0.9 %
10 SYRINGE (ML) INJECTION PRN
Status: DISCONTINUED | OUTPATIENT
Start: 2021-03-19 | End: 2021-03-20 | Stop reason: HOSPADM

## 2021-03-19 RX ORDER — LABETALOL HYDROCHLORIDE 5 MG/ML
5 INJECTION, SOLUTION INTRAVENOUS EVERY 10 MIN PRN
Status: DISCONTINUED | OUTPATIENT
Start: 2021-03-19 | End: 2021-03-19 | Stop reason: HOSPADM

## 2021-03-19 RX ORDER — LEVOTHYROXINE SODIUM 0.05 MG/1
50 TABLET ORAL
Status: DISCONTINUED | OUTPATIENT
Start: 2021-03-20 | End: 2021-03-20 | Stop reason: HOSPADM

## 2021-03-19 RX ORDER — HYDRALAZINE HYDROCHLORIDE 20 MG/ML
5 INJECTION INTRAMUSCULAR; INTRAVENOUS EVERY 5 MIN PRN
Status: DISCONTINUED | OUTPATIENT
Start: 2021-03-19 | End: 2021-03-19 | Stop reason: HOSPADM

## 2021-03-19 RX ORDER — SODIUM CHLORIDE 0.9 % (FLUSH) 0.9 %
10 SYRINGE (ML) INJECTION EVERY 12 HOURS SCHEDULED
Status: DISCONTINUED | OUTPATIENT
Start: 2021-03-19 | End: 2021-03-19 | Stop reason: HOSPADM

## 2021-03-19 RX ADMIN — FENTANYL CITRATE 100 MCG: 50 INJECTION, SOLUTION INTRAMUSCULAR; INTRAVENOUS at 13:39

## 2021-03-19 RX ADMIN — ONDANSETRON 4 MG: 2 INJECTION INTRAMUSCULAR; INTRAVENOUS at 13:46

## 2021-03-19 RX ADMIN — SODIUM CHLORIDE, POTASSIUM CHLORIDE, SODIUM LACTATE AND CALCIUM CHLORIDE 100 ML: 600; 310; 30; 20 INJECTION, SOLUTION INTRAVENOUS at 14:46

## 2021-03-19 RX ADMIN — DEXAMETHASONE SODIUM PHOSPHATE 10 MG: 4 INJECTION, SOLUTION INTRAMUSCULAR; INTRAVENOUS at 13:43

## 2021-03-19 RX ADMIN — LIDOCAINE HYDROCHLORIDE 100 MG: 20 INJECTION, SOLUTION INTRAVENOUS at 13:39

## 2021-03-19 RX ADMIN — ROCURONIUM BROMIDE 30 MG: 10 INJECTION INTRAVENOUS at 13:40

## 2021-03-19 RX ADMIN — SODIUM CHLORIDE, POTASSIUM CHLORIDE, SODIUM LACTATE AND CALCIUM CHLORIDE: 600; 310; 30; 20 INJECTION, SOLUTION INTRAVENOUS at 13:25

## 2021-03-19 RX ADMIN — SUGAMMADEX 200 MG: 100 INJECTION, SOLUTION INTRAVENOUS at 14:09

## 2021-03-19 RX ADMIN — PROPOFOL 200 MG: 10 INJECTION, EMULSION INTRAVENOUS at 13:39

## 2021-03-19 RX ADMIN — SODIUM CHLORIDE, POTASSIUM CHLORIDE, SODIUM LACTATE AND CALCIUM CHLORIDE: 600; 310; 30; 20 INJECTION, SOLUTION INTRAVENOUS at 11:40

## 2021-03-19 ASSESSMENT — PAIN SCALES - GENERAL
PAINLEVEL_OUTOF10: 0

## 2021-03-19 ASSESSMENT — PULMONARY FUNCTION TESTS
PIF_VALUE: 2
PIF_VALUE: 1
PIF_VALUE: 16
PIF_VALUE: 16
PIF_VALUE: 3
PIF_VALUE: 2
PIF_VALUE: 0
PIF_VALUE: 16
PIF_VALUE: 16
PIF_VALUE: 2
PIF_VALUE: 17
PIF_VALUE: 1
PIF_VALUE: 0
PIF_VALUE: 17
PIF_VALUE: 16
PIF_VALUE: 17
PIF_VALUE: 16
PIF_VALUE: 17
PIF_VALUE: 16
PIF_VALUE: 3
PIF_VALUE: 2
PIF_VALUE: 19
PIF_VALUE: 17
PIF_VALUE: 0
PIF_VALUE: 16
PIF_VALUE: 3
PIF_VALUE: 13
PIF_VALUE: 3
PIF_VALUE: 17

## 2021-03-19 ASSESSMENT — PAIN - FUNCTIONAL ASSESSMENT: PAIN_FUNCTIONAL_ASSESSMENT: 0-10

## 2021-03-19 NOTE — ANESTHESIA PRE PROCEDURE
Department of Anesthesiology  Preprocedure Note       Name:  Ghazal Mitchell   Age:  69 y.o.  :  1950                                          MRN:  [id number]         Date:  3/19/2021      Surgeon: Sandra Ellison): Andrew Hu MD    Procedure: Procedure(s):  EXCISE LESION LEFT ORAL TONGUE    Medications prior to admission:   Prior to Admission medications    Medication Sig Start Date End Date Taking? Authorizing Provider   levothyroxine (SYNTHROID) 50 MCG tablet Take 1 tablet by mouth nightly 20   Dennis Domingo MD   vitamin D 1000 UNITS CAPS Take 2,000 Units by mouth nightly     Historical Provider, MD       Current medications:    Current Facility-Administered Medications   Medication Dose Route Frequency Provider Last Rate Last Admin    lactated ringers infusion   Intravenous Continuous Miguel Ángel Segundo MD        lactated ringers infusion   Intravenous Continuous Aviva Alvarado MD        sodium chloride flush 0.9 % injection 10 mL  10 mL Intravenous 2 times per day Aviva Alvarado MD        sodium chloride flush 0.9 % injection 10 mL  10 mL Intravenous PRN Aviva Alvarado MD        lidocaine PF 1 % injection 1 mL  1 mL Intradermal Once PRN Aviva Alvarado MD           Allergies:     Allergies   Allergen Reactions    Pcn [Penicillins] Other (See Comments)     Childhood       Problem List:    Patient Active Problem List   Diagnosis Code    Hypothyroidism E03.9    Vitamin D deficiency E55.9    Heart palpitations R00.2    Myalgia M79.10    Pain of both wrist joints M25.531, M25.532    Paresthesias R20.2    Pes planus of both feet M21.41, M21.42    Pain in joint M25.50    Sicca syndrome ([address] M35.00    Kidney stone N20.0    Hydronephrosis with ureteropelvic junction (UPJ) obstruction Q62.11    Right flank pain R10.9    Elevated blood pressure reading without diagnosis of hypertension R03.0    MI (acute kidney injury) ([address] N17.9       Past Medical History:        Diagnosis Date    Hyperlipidemia     Kidney stone 5/13/2020    Scalp lesion 2017    squamous cell Ca    Thyroid disease        Past Surgical History:        Procedure Laterality Date    COLONOSCOPY  4/4/2005    COLONOSCOPY  01/05/2018    small polyps 5-10 years adenomatousvs hyperplastic    CYSTOSCOPY Right 5/14/2020    CYSTOSCOPY, RIGHT RETROGRADE PYLOGRAM, RIGHT URETEROSCOPY, LASER, STONE MANIPULATION AND EXTRACTION AND RIGHT STENT INSERTION performed by Aron Scheuermann, [facility] at [facility]  2016    Dr Cristal Lloyd, scalp lesion , squamous cell cancer     SINUS SURGERY      TONSILLECTOMY         Social History:    Social History     Tobacco Use    Smoking status: Never Smoker    Smokeless tobacco: Never Used   Substance Use Topics    Alcohol use: No                                Counseling given: Not Answered      Vital Signs (Current):   Vitals:    03/16/21 1645 03/19/21 1037   BP:  (!) 160/94   Pulse:  98   Resp:  14   Temp:  99.5 °F (37.5 °C)   TempSrc:  Oral   SpO2:  95%   Weight: 208 lb (94.3 kg) 208 lb (94.3 kg)   Height: 5' 8\" (1.727 m) 5' 8\" (1.727 m)                                              BP Readings from Last 3 Encounters:   03/19/21 (!) 160/94   03/12/21 139/81   03/10/21 (!) 140/80       NPO Status:                                                                                 BMI:   Wt Readings from Last 3 Encounters:   03/19/21 208 lb (94.3 kg)   03/12/21 202 lb 9.6 oz (91.9 kg)   03/10/21 204 lb 3.2 oz (92.6 kg)     Body mass index is 31.63 kg/m².     CBC:   Lab Results   Component Value Date    WBC 7.9 09/28/2020    RBC 4.68 09/28/2020    HGB 15.7 09/28/2020    HCT 45.6 09/28/2020    MCV 97.5 09/28/2020    RDW 14.1 09/28/2020     09/28/2020       CMP:   Lab Results   Component Value Date     09/28/2020    K 4.3 09/28/2020    K 3.9 05/15/2020     09/28/2020    CO2 24 09/28/2020    BUN 16 09/28/2020    CREATININE 0.9 09/28/2020    GFRAA >60 09/28/2020    GFRAA >60 05/08/2012    AGRATIO 2.4 09/28/2020    LABGLOM >60 09/28/2020    GLUCOSE 91 09/28/2020    PROT 7.1 09/28/2020    PROT 7.1 05/08/2012    CALCIUM 9.5 09/28/2020    BILITOT 0.7 09/28/2020    ALKPHOS 81 09/28/2020    AST 24 09/28/2020    ALT 29 09/28/2020       POC Tests: No results for input(s): POCGLU, POCNA, POCK, POCCL, POCBUN, POCHEMO, POCHCT in the last 72 hours. Coags: No results found for: PROTIME, INR, APTT    HCG (If Applicable): No results found for: PREGTESTUR, PREGSERUM, HCG, HCGQUANT     ABGs: No results found for: PHART, PO2ART, GQA0VRU, INP2FXK, BEART, M2HIUAJB     Type & Screen (If Applicable):  No results found for: LABABO, LABRH    Drug/Infectious Status (If Applicable):  No results found for: HIV, HEPCAB    COVID-19 Screening (If Applicable):   Lab Results   Component Value Date    COVID19 Not Detected 03/15/2021           Anesthesia Evaluation  Patient summary reviewed no history of anesthetic complications:   Airway: Mallampati: III  TM distance: >3 FB   Neck ROM: full  Mouth opening: > = 3 FB Dental: normal exam         Pulmonary:                              Cardiovascular:                   ROS comment: Hx of palpitations on chart but asymptomatic      Neuro/Psych:               GI/Hepatic/Renal:   (+) renal disease: kidney stones,           Endo/Other:    (+) hypothyroidism::., .                  ROS comment: Sicca syndrome (HCC Abdominal:           Vascular:                                        Anesthesia Plan      general     ASA 2       Induction: intravenous. Anesthetic plan and risks discussed with patient.                       Elaine Self MD   3/19/2021

## 2021-03-19 NOTE — BRIEF OP NOTE
Brief Postoperative Note      Patient: Jocelynn Born  YOB: 1950  MRN: [id number]    Date of Procedure: 3/19/2021    Pre-Op Diagnosis: Lesion left oral tongue K14.8    Post-Op Diagnosis: Same       Procedure; Partial resection left oral tongue  Surgeon(s): Scooby Collazo MD    Assistant:  * No surgical staff found *    Anesthesia: General    Estimated Blood Loss (mL): Minimal    Complications: None    Specimens:   ID Type Source Tests Collected by Time Destination   A : A.  PARTIAL RESECTION OF THE TONGUE <1 [facility] Tissue Tissue SURGICAL PATHOLOGY Scooby Collazo MD 3/19/2021 1353        Implants:  * No implants in log *      Drains: * No LDAs found *      Electronically signed by Scooby Collazo MD on 3/19/2021 at 2:11 PM

## 2021-03-19 NOTE — PROGRESS NOTES
From OR to pacu bay 18 accompanied by Maricruz Guzman and monitors applied. Intraop meds discussed. Pt arrives with oral airway in place. Per faces scale on arrival 0/10. Oral airway removed at 1429 and airway patent. Pt denies pain when oral airway removed.

## 2021-03-19 NOTE — PROGRESS NOTES
Admission Progress Note  3/19/2021 4:43 PM     Data: Patient to room 5309 from PACU. See doc flow sheets for assessments and screenings. Action: Patient oriented to room and call light. Patient informed of plan of care. Reviewed the patient education folder with the patient and/or family. Patient instructed to call nurse with any needs or concerns. Response:  Patient verbalized understanding of plan of care and all instructions. Bed locked and in lowest position. Call light in reach. Will continue to monitor patient per plan of care.     Daniel Islas RN Electronically signed by Daniel Islas RN on 3/19/2021 at 4:43 PM    ________________________________________________________________________

## 2021-03-19 NOTE — PROGRESS NOTES
PACU Transfer to Floor Note    Current Allergies: Pcn [penicillins]    Pt meets criteria as per Dave Score and ASPAN Standards to transfer to next phase of care. No results for input(s): POCGLU in the last 72 hours. Vitals:    03/19/21 1630   BP: (!) 148/81   Pulse: 84   Resp: 15   Temp: 97.2 °F (36.2 °C)   SpO2: 100%     Vitals within 20% of pt's admission vitals as per DAVE SCORE    SpO2: 100 %    O2 Flow Rate (L/min): 1 L/min      Intake/Output Summary (Last 24 hours) at 3/19/2021 1633  Last data filed at 3/19/2021 1630  Gross per 24 hour   Intake 1252 ml   Output    Net 1252 ml       Pain assessment:  none    Pain Level: 0    Patient was assessed for alterations to skin integrity. There were not alterations observed. Is patient incontinent: no    Handoff report given at bedside.  To RN on [address] awaiting call to  tomorrow      3/19/2021 4:33 PM

## 2021-03-20 VITALS
BODY MASS INDEX: 31.52 KG/M2 | WEIGHT: 208 LBS | DIASTOLIC BLOOD PRESSURE: 81 MMHG | TEMPERATURE: 98.1 F | OXYGEN SATURATION: 94 % | HEIGHT: 68 IN | RESPIRATION RATE: 18 BRPM | SYSTOLIC BLOOD PRESSURE: 124 MMHG | HEART RATE: 79 BPM

## 2021-03-20 PROCEDURE — 6370000000 HC RX 637 (ALT 250 FOR IP): Performed by: OTOLARYNGOLOGY

## 2021-03-20 PROCEDURE — G0378 HOSPITAL OBSERVATION PER HR: HCPCS

## 2021-03-20 PROCEDURE — 2580000003 HC RX 258: Performed by: OTOLARYNGOLOGY

## 2021-03-20 RX ORDER — OXYCODONE HYDROCHLORIDE AND ACETAMINOPHEN 5; 325 MG/1; MG/1
1 TABLET ORAL EVERY 4 HOURS PRN
Qty: 20 TABLET | Refills: 0 | Status: SHIPPED | OUTPATIENT
Start: 2021-03-20 | End: 2021-03-25

## 2021-03-20 RX ADMIN — SODIUM CHLORIDE, POTASSIUM CHLORIDE, SODIUM LACTATE AND CALCIUM CHLORIDE: 600; 310; 30; 20 INJECTION, SOLUTION INTRAVENOUS at 00:06

## 2021-03-20 RX ADMIN — OXYCODONE 5 MG: 5 TABLET ORAL at 00:06

## 2021-03-20 RX ADMIN — LEVOTHYROXINE SODIUM 50 MCG: 50 TABLET ORAL at 07:19

## 2021-03-20 RX ADMIN — OXYCODONE 5 MG: 5 TABLET ORAL at 04:46

## 2021-03-20 ASSESSMENT — PAIN SCALES - GENERAL
PAINLEVEL_OUTOF10: 4
PAINLEVEL_OUTOF10: 0
PAINLEVEL_OUTOF10: 4

## 2021-03-20 ASSESSMENT — PAIN DESCRIPTION - PAIN TYPE: TYPE: SURGICAL PAIN

## 2021-03-20 ASSESSMENT — PAIN DESCRIPTION - ORIENTATION: ORIENTATION: LEFT

## 2021-03-20 NOTE — PROGRESS NOTES
Pt given discharge instructions and prescription, had no further questions. Iv removed; no complications.

## 2021-03-20 NOTE — OP NOTE
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 OPERATION:  Partial resection left tongue, less than one-half. SURGEON:  Job Fall MD    ANESTHESIA:  General with endotracheal intubation. DESCRIPTION OF PROCEDURE:  Under satisfactory general anesthesia, the  patient's mouth was opened with a Rogers mouth gag. The oral tongue  was grasped with a perforating towel clip, and the tongue was retracted  anteriorly and to the right exposing the left oral tongue. The patient  had an exfoliative lesion of the left oral tongue which had been  biopsied in the office and found to show squamous atypia. In addition,  there was an area of leukoplakia that was anterior to this. The entire  area was excised in an elliptical fashion using a cautery pencil set at  25 cut and 25 sprays, blend one. The incision was carried deep through  the mucosa and into the musculature to make certain that we were around  the lesion. The tongue felt soft after the lesion was excised. Suction  cautery set at 30 sprays was used to cauterize the surgical base. There  was minimal bleeding. The tongue was loosely approximated with several  sutures of 2-0 Vicryl. The patient had been given 10 mg of Decadron IV  to minimize swelling of the tongue. The patient tolerated the procedure  well and was transferred to the recovery room in good condition.    Estimated blood loss was minimal.        Ladoris Phoenix, MD    D: 03/19/2021 14:16:40       T: 03/19/2021 14:28:39     [email or web address]  Job#: [id number]     Doc#: [id number]    CC:

## 2021-03-20 NOTE — PROGRESS NOTES
Pt ao4, vss. Pt denied surgical pain, n/v, sob, nor difficulty of breathing or drinking. Minimal swelling at surg site, not much changed since shift change at 7pm. Pt calls out appropriately for assistance. Tele is active. Bed alarm remains engaged.  Will continue to monitor

## 2021-03-20 NOTE — PLAN OF CARE
Problem: Airway Clearance - Ineffective:  Goal: Ability to maintain a clear airway will improve  Description: Ability to maintain a clear airway will improve  Outcome: Ongoing  Note: Pt denies difficulty breathing. Will continue to monitor and check airway status. Problem: Pain:  Goal: Pain level will decrease  Description: Pain level will decrease  Outcome: Ongoing  Note: Pt encouraged to use call light to notify staff when pain rises beyond tolerable. Pt educated on pain scale and was using call light appropriately.

## 2021-03-20 NOTE — DISCHARGE SUMMARY
[address] [facility]               [address] [facility], [address] [place] [address] Ave                               DISCHARGE SUMMARY  PATIENT NAME: Ryan Gilman                   :        1950  MED REC NO:   [id number]                          ROOM:       5309  ACCOUNT NO:   [de-identified]                           ADMIT DATE: 2021  PROVIDER:     Radha Villa MD                  DISCHARGE DATE:  2021    ADMITTING DIAGNOSIS:  Neoplasm of the tongue. SURGERY:  Partial resection of the left side of the oral tongue,  2021. FINAL SUMMARY:  This patient is a 67-year-old male who had a lesion of  the lateral border of the tongue which was biopsied in the office and  found to be squamous dysplasia. He was taken to the operating room on  2021 for partial glossectomy to remove the lesion entirely. His  postoperative course was uneventful. He had little tongue swelling. He  is discharged to home on Percocet in improved condition. He will be  followed up in the office in five days.         Curt Toledo MD    D: 2021 9:03:25       T: 2021 9:14:18     [email or web address]  Job#: [id number]     Doc#: [id number]    CC:

## 2021-03-24 ENCOUNTER — TELEPHONE (OUTPATIENT)
Dept: INTERNAL MEDICINE CLINIC | Age: 71
End: 2021-03-24

## 2021-03-24 NOTE — TELEPHONE ENCOUNTER
[facility] Transitions Initial Follow Up Call    Outreach made within 2 business days of discharge: Yes    Patient: Christopher Arevalo Patient : 1950   MRN: <[id number]>  Reason for Admission: There are no discharge diagnoses documented for the most recent discharge. Discharge Date: 3/20/21       Spoke with: July Manzano    Discharge department/facility: [facility] Patient Contact:  Was patient able to fill all prescriptions:   Was patient instructed to bring all medications to the follow-up visit:   Is patient taking all medications as directed in the discharge summary?  Yes  Does patient understand their discharge instructions: Yes  Does patient have questions or concerns that need addressed prior to 7-14 day follow up office visit: no    Scheduled appointment with PCP within 7-14 days    Follow Up  Future Appointments   Date Time Provider Amilcar Phillips   3/25/2021  1:00 PM Briana Smallwood MD [id number]       Cecilio Graves MA

## 2021-03-25 ENCOUNTER — OFFICE VISIT (OUTPATIENT)
Dept: ENT CLINIC | Age: 71
End: 2021-03-25

## 2021-03-25 DIAGNOSIS — C02.3 CARCINOMA OF ANTERIOR TWO-THIRDS OF TONGUE (HCC): Primary | ICD-10-CM

## 2021-03-25 PROCEDURE — 99024 POSTOP FOLLOW-UP VISIT: CPT | Performed by: OTOLARYNGOLOGY

## 2021-03-25 NOTE — PROGRESS NOTES
6 days following partial resection left oral tongue. Included neoplasm was read as a squamous cell carcinoma T1. Tongue is healing well. Follow-up 1 month.

## 2021-03-26 ENCOUNTER — TELEPHONE (OUTPATIENT)
Dept: ENT CLINIC | Age: 71
End: 2021-03-26

## 2021-03-26 NOTE — TELEPHONE ENCOUNTER
Had surgery, seen in office yesterday now with left ear pain. Please advise.  Patient wants to know if this is normal?

## 2021-04-26 ENCOUNTER — OFFICE VISIT (OUTPATIENT)
Dept: ENT CLINIC | Age: 71
End: 2021-04-26

## 2021-04-26 DIAGNOSIS — C02.3 CARCINOMA OF ANTERIOR TWO-THIRDS OF TONGUE (HCC): Primary | ICD-10-CM

## 2021-04-26 PROCEDURE — 99024 POSTOP FOLLOW-UP VISIT: CPT | Performed by: OTOLARYNGOLOGY

## 2021-04-26 NOTE — PROGRESS NOTES
Now 5 weeks following partial resection oral tongue for T1 squamous cell carcinoma. Margins clear. Feels a little tightness in his tongue. No pain. No limitation to swallowing. Lerry Rumps is completely healed. Palpation of the tongue is soft. Follow-up: 3 months.

## 2021-06-25 ENCOUNTER — OFFICE VISIT (OUTPATIENT)
Dept: ENT CLINIC | Age: 71
End: 2021-06-25
Payer: COMMERCIAL

## 2021-06-25 VITALS
TEMPERATURE: 98.2 F | SYSTOLIC BLOOD PRESSURE: 137 MMHG | HEIGHT: 68 IN | WEIGHT: 203 LBS | DIASTOLIC BLOOD PRESSURE: 81 MMHG | HEART RATE: 75 BPM | BODY MASS INDEX: 30.77 KG/M2

## 2021-06-25 DIAGNOSIS — Z12.81: ICD-10-CM

## 2021-06-25 DIAGNOSIS — C02.3 CARCINOMA OF ANTERIOR TWO-THIRDS OF TONGUE (HCC): Primary | ICD-10-CM

## 2021-06-25 PROCEDURE — 99213 OFFICE O/P EST LOW 20 MIN: CPT | Performed by: OTOLARYNGOLOGY

## 2021-06-25 NOTE — PROGRESS NOTES
FOLLOW UP VISIT:    CHIEF COMPLAINT: Carcinoma of the oral tongue. INTERIM HISTORY: 14 weeks following excision lesion of the left oral tongue which was a invasive moderately differentiated squamous cell carcinoma. Edges of resection were clear for tumor but there was some residual dysplasia. PAST MEDICAL HISTORY:   Social History     Tobacco Use   Smoking Status Never Smoker   Smokeless Tobacco Never Used                                                    Social History     Substance and Sexual Activity   Alcohol Use No                                                    Current Outpatient Medications:     levothyroxine (SYNTHROID) 50 MCG tablet, Take 1 tablet by mouth nightly, Disp: 90 tablet, Rfl: 3    vitamin D 1000 UNITS CAPS, Take 2,000 Units by mouth nightly , Disp: , Rfl:                                                  Past Medical History:   Diagnosis Date    Hyperlipidemia     Kidney stone 05/13/2020    Scalp lesion 2017    squamous cell Ca    Thyroid disease                                                     Past Surgical History:   Procedure Laterality Date    COLONOSCOPY  04/04/2005    COLONOSCOPY  01/05/2018    small polyps 5-10 years adenomatousvs hyperplastic    CYSTOSCOPY Right 05/14/2020    CYSTOSCOPY, RIGHT RETROGRADE PYLOGRAM, RIGHT URETEROSCOPY, LASER, STONE MANIPULATION AND EXTRACTION AND RIGHT STENT INSERTION performed by Raudel Lopez MD at [facility]  2016    Dr Devon Ignacio, scalp lesion , squamous cell cancer     MOUTH SURGERY Left 3/19/2021    EXCISE LESION LEFT ORAL TONGUE performed by Jackelyn Valdovinos MD at [address] [place] [address] Drive: Family history reviewed and except as pertinent to the interim history is not contributory. REVIEW OF SYSTEMS:  All pertinent positive and negative findings included in HPI.   Otherwise, all other systems are reviewed and are negative    PHYSICAL EXAMINATION:   GENERAL: wdwn- no acute distress  RESPIRATORY: No stridor. COMMUNICATION :  Normal voice  MENTAL STATUS: Alert and oriented x3  HEAD AND FACE: No skin lesions of the face. EXTERNAL EARS AND NOSE: The external ears and nasal pyramid are normal.  FACIAL MUSCLES: All branches of the facial nerve intact. EXTRAOCULAR MUSCLES: Intact with full range of motion. LIPS, TEETH, GINGIVA:  Normal mucosa  PHARYNX: Tongue is well-healed. There is a small amount of leukoplakia anterior to the excision site but this is soft to palpation as is the excision site. NECK:  No masses. LYMPH NODES: No cervical lymphadenopathy. SALIVARY GLANDS: Parotid and submandibular glands normal.  THYROID: No goiter or thyroid nodules palpable. IMPRESSION: Status post excision carcinoma of the tongue. No evidence of recurrent or persistent disease. PLAN: Patient reassured. FOLLOW-UP: 3 months.

## 2021-07-07 RX ORDER — LEVOTHYROXINE SODIUM 0.05 MG/1
TABLET ORAL
Qty: 90 TABLET | Refills: 3 | Status: SHIPPED | OUTPATIENT
Start: 2021-07-07 | End: 2022-07-05

## 2021-09-24 ENCOUNTER — OFFICE VISIT (OUTPATIENT)
Dept: ENT CLINIC | Age: 71
End: 2021-09-24
Payer: COMMERCIAL

## 2021-09-24 VITALS
WEIGHT: 208 LBS | DIASTOLIC BLOOD PRESSURE: 80 MMHG | HEART RATE: 85 BPM | SYSTOLIC BLOOD PRESSURE: 137 MMHG | HEIGHT: 68 IN | BODY MASS INDEX: 31.52 KG/M2 | TEMPERATURE: 97.7 F

## 2021-09-24 DIAGNOSIS — K14.8 LESION OF TONGUE: Primary | ICD-10-CM

## 2021-09-24 PROCEDURE — 99212 OFFICE O/P EST SF 10 MIN: CPT | Performed by: OTOLARYNGOLOGY

## 2021-09-24 NOTE — PROGRESS NOTES
FOLLOW UP VISIT:    CHIEF COMPLAINT: Carcinoma of the tongue    INTERIM HISTORY: 6 months following partial glossectomy left for squamous cell carcinoma of the oral tongue. Carcinoma completely excised. No tongue pain. PAST MEDICAL HISTORY:   Social History     Tobacco Use   Smoking Status Never Smoker   Smokeless Tobacco Never Used                                                    Social History     Substance and Sexual Activity   Alcohol Use No                                                    Current Outpatient Medications:     levothyroxine (SYNTHROID) 50 MCG tablet, TAKE 1 TABLET BY MOUTH EVERY DAY AT NIGHT, Disp: 90 tablet, Rfl: 3    vitamin D 1000 UNITS CAPS, Take 2,000 Units by mouth nightly , Disp: , Rfl:                                                  Past Medical History:   Diagnosis Date    Hyperlipidemia     Kidney stone 05/13/2020    Scalp lesion 2017    squamous cell Ca    Thyroid disease                                                     Past Surgical History:   Procedure Laterality Date    COLONOSCOPY  04/04/2005    COLONOSCOPY  01/05/2018    small polyps 5-10 years adenomatousvs hyperplastic    CYSTOSCOPY Right 05/14/2020    CYSTOSCOPY, RIGHT RETROGRADE PYLOGRAM, RIGHT URETEROSCOPY, LASER, STONE MANIPULATION AND EXTRACTION AND RIGHT STENT INSERTION performed by Vannessa Alejandra MD at [place] [address]    Dr Melissa Yap, scalp lesion , squamous cell cancer     MOUTH SURGERY Left 3/19/2021    EXCISE LESION LEFT ORAL TONGUE performed by Luz Levin MD at [address] [place] [address] HISTORY: Family history reviewed and except as pertinent to the interim history is not contributory. REVIEW OF SYSTEMS:  All pertinent positive and negative findings included in HPI. Otherwise, all other systems are reviewed and are negative    PHYSICAL EXAMINATION:   GENERAL: wdwn- no acute distress  RESPIRATORY: No stridor.   COMMUNICATION : Normal voice  MENTAL STATUS: Alert and oriented x3  HEAD AND FACE: No skin lesions of the face. EXTERNAL EARS AND NOSE: The external ears and nasal pyramid are normal.  FACIAL MUSCLES: All branches of the facial nerve intact. EXTRAOCULAR MUSCLES: Intact with full range of motion. LIPS, TEETH, GINGIVA:  Normal mucosa  PHARYNX: Excision site on the tongue is well-healed. Anterior to the excision site there are several areas of leukoplakia. NECK:  No masses. LYMPH NODES: No cervical lymphadenopathy. SALIVARY GLANDS: Parotid and submandibular glands normal.  THYROID: No goiter or thyroid nodules palpable. 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 PLAN: After discussion with patient, agreed to do a further resection of the leukoplakia of the left lateral border of the tongue. FOLLOW-UP: At surgery.

## 2021-09-27 NOTE — PROGRESS NOTES
[facility] patients having surgery or anesthesia are required to be Covid tested OR to have been vaccinated at least 14 days prior to your procedure. It is very important to return our call to [phone] and notify the staff of your last vaccination date otherwise you will be required to complete Covid PCR test within the 5-6 days prior to surgery & quarantine. The results will need to be faxed to PreAdmission Testing at [phone]. PRIOR TO PROCEDURE DATE:        1. PLEASE FOLLOW ANY  GUIDELINES/ INSTRUCTIONS PRIOR TO YOUR PROCEDURE AS ADVISED BY YOUR SURGEON. 2. Arrange for someone to drive you home and be with you for the first 24 hours after discharge for your safety after your procedure for which you received sedation. Ensure it is someone we can share information with regarding your discharge. 3. You must contact your surgeon for instructions IF:   You are taking any blood thinners, aspirin, anti-inflammatory or vitamin E.   There is a change in your physical condition such as a cold, fever, rash, cuts, sores or any other infection, especially near your surgical site. 4. Do not drink alcohol the day before or day of your procedure. 5. A Pre-op History and Physical for surgery MUST be completed by your Physician or Urgent Care within 30 days of your procedure date. Please bring a copy with you on the day of your procedure and along with any other testing performed. THE DAY OF YOUR PROCEDURE:  1. Follow instructions for ARRIVAL TIME as DIRECTED BY YOUR SURGEON. 2. Enter the MAIN entrance from [facility] and follow the signs to the free [employer] or [employer] parking (offered free of charge 6am-5pm). 3. Enter the Main Entrance of the hospital (do not enter from the lower level of the parking garage). Upon entrance, check in with the  at the main desk on your left. If no one is available at the desk, proceed into the [facility] Waiting Room and go through the door directly into the [facility]. There is a Check-in desk ACROSS from Room 5 (marked with a sign hanging from the ceiling). The phone number for the surgery center is [phone]. 4. Please call [phone] option #2 option #2 if you have not been preregistered yet. On the day of your procedure bring your insurance card and photo ID. You will be registered at your bedside once brought back to your room. 5. DO NOT EAT ANYTHING eight hours prior to your arrival for surgery. May have 8 ounces of water 4 hours prior to your arrival for surgery. NOTE: ALL Gastric, Bariatric and Bowel surgery patients MUST follow their surgeon's instructions. 6. MEDICATIONS    Take the following medications with a SMALL sip of water: synthroid   Bariatric patient's call surgeon if on diabetic medications as some need to be stopped 1 week preop   Use your usual dose of inhalers the morning of surgery. BRING your rescue inhaler with you to hospital.    Anesthesia does NOT want you to take insulin the morning of surgery. They will control your blood sugar while you are at the hospital. Please contact your ordering physician for instructions regarding your insulin the night before your procedure. If you have an insulin pump, please keep it set on basal rate. 7. Do not swallow water when brushing teeth. No gum, candy, mints or ice chips. Refrain from smoking or at least decrease the amount. 8. Dress in loose, comfortable clothing appropriate for redressing after your procedure. Do not wear jewelry (including body piercings), make-up (especially NO eye make-up), fingernail polish (NO toenail polish if foot/leg surgery), lotion, powders or metal hairclips. 9. Dentures, glasses, or contacts will need to be removed before your procedure.  Bring cases for your glasses, contacts, dentures, or hearing aids to protect them while you are in surgery. 10. If you use a CPAP, please bring it with you on the day of your procedure. 11. We recommend that valuable personal  belongings such as cash, cell phones, e-tablets or jewelry, be left at home during your stay. The hospital will not be responsible for valuables that are not secured in the hospital safe. However, if your insurance requires a co-pay, you may want to bring a method of payment, i.e. Check or credit card, if you wish to pay your co-pay the day of surgery. 12. If you are to stay overnight, you may bring a bag with personal items. Please have any large items you may need brought in by your family after your arrival to your hospital room. 15. If you have a Living Will or Durable Power of , please bring a copy on the day of your procedure. 15. With your permission, one family member may accompany you while you are being prepared for surgery. Once you are ready, additional family members may join you. HOW WE KEEP YOU SAFE and WORK TO PREVENT SURGICAL SITE INFECTIONS:  1. Health care workers should always check your ID bracelet to verify your name and birth date. You will be asked many times to state your name, date of birth, and allergies. 2. Health care workers should always clean their hands with soap or alcohol gel before providing care to you. It is okay to ask anyone if they cleaned their hands before they touch you. 3. You will be actively involved in verifying the type of procedure you are having and ensuring the correct surgical site. This will be confirmed multiple times prior to your procedure. Do NOT benny your surgery site UNLESS instructed to by your surgeon. 4. Do not shave or wax for 72 hours prior to procedure near your operative site. Shaving with a razor can irritate your skin and make it easier to develop an infection.  On the day of your procedure, any hair that needs to be removed near the surgical site will be PM      ADDITIONAL EDUCATIONAL INFORMATION REVIEWED PER PHONE WITH YOU AND/OR YOUR FAMILY:  No Hibiclens® Bathing Instructions   Yes Antibacterial Soap

## 2021-09-27 NOTE — PROGRESS NOTES
Place patient label inside box (if no patient label, complete below)  Name:  :  MR#:   Kayla Pool / PROCEDURE  1. I (we), Amirahvito Prasad (Patient Name) authorize Ashley Delgado MD (Provider / Keon Whitt) and/or such assistants as may be selected by him/her, to perform the following operation/procedure(s): PARTIAL GLOSSECTOMY, LEFT        Note: If unable to obtain consent prior to an emergent procedure, document the emergent reason in the medical record. This procedure has been explained to my (our) satisfaction and included in the explanation was:  A) The intended benefit, nature, and extent of the procedure to be performed;  B) The significant risks involved and the probability of success;  C) Alternative procedures and methods of treatment;  D) The dangers and probable consequences of such alternatives (including no procedure or treatment); E) The expected consequences of the procedure on my future health;  F) Whether other qualified individuals would be performing important surgical tasks and/or whether  would be present to advise or support the procedure. I (we) understand that there are other risks of infection and other serious complications in the pre-operative/procedural and postoperative/procedural stages of my (our) care. I (we) have asked all of the questions which I (we) thought were important in deciding whether or not to undergo treatment or diagnosis. These questions have been answered to my (our) satisfaction. I (we) understand that no assurance can be given that the procedure will be a success, and no guarantee or warranty of success has been given to me (us). 2. It has been explained to me (us) that during the course of the operation/procedure, unforeseen conditions may be revealed that necessitate extension of the original procedure(s) or different procedure(s) than those set forth in Paragraph 1.  I (we) authorize and request that the above-named physician, his/her assistants or his/her designees, perform procedures as necessary and desirable if deemed to be in my (our) best interest.     Revised 8/2/2021                                                                          Page 1 of 2           3. I acknowledge that health care personnel may be observing this procedure for the purpose of medical education or other specified purposes as may be necessary as requested and/or approved by my (our) physician. 4. I (we) consent to the disposal by the hospital Pathologist of the removed tissue, parts or organs in accordance with hospital policy. 5. I do ____ do not ____ consent to the use of a local infiltration pain blocking agent that will be used by my provider/surgical provider to help alleviate pain during my procedure. 6. I do ____ do not ____ consent to an emergent blood transfusion in the case of a life-threatening situation that requires blood components to be administered. This consent is valid for 24 hours from the beginning of the procedure. 7. This patient does ____ or does not ____ currently have a DNR status/order. If DNR order is in place, obtain Addendum to the Surgical Consent for ALL Patients with a DNR Order to address chaparro-operative status for limited intervention or DNR suspension.    8. I have read and fully understand the above Consent for Operation/Procedure and that all blanks were completed before I signed the consent.   _____________________________       _____________________      ____/____am/pm  Signature of Patient or legal representative      Printed Name / Relationship            Date / Time   ____________________________       _____________________      ____/____am/pm  Witness to Signature                                    Printed Name                    Date / Time     If patient is unable to sign or is a minor, complete the following)  Patient is a minor, ____ years of age, or unable to sign because:   ______________________________________________________________________________________________    [facility] If a phone consent is obtained, consent will be documented by using two health care professionals, each affirming that the consenting party has no questions and gives consent for the procedure discussed with the physician/provider.   _____________________          ____________________       _____/_____am/pm   2nd witness to phone consent        Printed name           Date / Time    Informed Consent:  I have provided the explanation described above in section 1 to the patient and/or legal representative.  I have provided the patient and/or legal representative with an opportunity to ask any questions about the proposed operation/procedure.   ___________________________          ____________________         ____/____am/pm  Provider / Proceduralist                            Printed name            Date / Time  Revised 8/2/2021                                                                      Page 2 of 2

## 2021-09-28 ENCOUNTER — OFFICE VISIT (OUTPATIENT)
Dept: INTERNAL MEDICINE CLINIC | Age: 71
End: 2021-09-28
Payer: COMMERCIAL

## 2021-09-28 VITALS
TEMPERATURE: 98.4 F | WEIGHT: 205 LBS | SYSTOLIC BLOOD PRESSURE: 138 MMHG | BODY MASS INDEX: 31.07 KG/M2 | DIASTOLIC BLOOD PRESSURE: 76 MMHG | HEIGHT: 68 IN

## 2021-09-28 DIAGNOSIS — D37.02 NEOPLASM OF UNCERTAIN BEHAVIOR OF ANTERIOR TWO-THIRDS OF TONGUE: ICD-10-CM

## 2021-09-28 DIAGNOSIS — Z01.818 PRE-OP EXAM: Primary | ICD-10-CM

## 2021-09-28 DIAGNOSIS — E03.4 HYPOTHYROIDISM DUE TO ACQUIRED ATROPHY OF THYROID: ICD-10-CM

## 2021-09-28 PROCEDURE — 99243 OFF/OP CNSLTJ NEW/EST LOW 30: CPT | Performed by: INTERNAL MEDICINE

## 2021-09-28 SDOH — ECONOMIC STABILITY: FOOD INSECURITY: WITHIN THE PAST 12 MONTHS, YOU WORRIED THAT YOUR FOOD WOULD RUN OUT BEFORE YOU GOT MONEY TO BUY MORE.: NEVER TRUE

## 2021-09-28 SDOH — ECONOMIC STABILITY: FOOD INSECURITY: WITHIN THE PAST 12 MONTHS, THE FOOD YOU BOUGHT JUST DIDN'T LAST AND YOU DIDN'T HAVE MONEY TO GET MORE.: NEVER TRUE

## 2021-09-28 ASSESSMENT — SOCIAL DETERMINANTS OF HEALTH (SDOH): HOW HARD IS IT FOR YOU TO PAY FOR THE VERY BASICS LIKE FOOD, HOUSING, MEDICAL CARE, AND HEATING?: NOT HARD AT ALL

## 2021-09-28 NOTE — PROGRESS NOTES
Chief Complaint   Patient presents with    Pre-op Exam     lesion on tongue, Dr. Calli Das, [facility] 9/30/21          Thien Kaba is a 66 y.o. male who presents for a preoperative physical examination. He is scheduled to have partial glossectomy done by Dr. Calli Das at [facility] on 9/30/21. History of Present Illness:      Brandon Escobar has a lesion on his left tongue for the last 1 year. Past Medical History:   Diagnosis Date    Cancer [facility])     skin cancer    Hyperlipidemia     Kidney stone 05/13/2020    Scalp lesion 2017    squamous cell Ca    Sinus congestion     Thyroid disease         Review of patient's past surgical history indicates:     Past Surgical History:   Procedure Laterality Date    COLONOSCOPY  04/04/2005    COLONOSCOPY  01/05/2018    small polyps 5-10 years adenomatousvs hyperplastic    CYSTOSCOPY Right 05/14/2020    CYSTOSCOPY, RIGHT RETROGRADE PYLOGRAM, RIGHT URETEROSCOPY, LASER, STONE MANIPULATION AND EXTRACTION AND RIGHT STENT INSERTION performed by Keeley Rivera MD at [facility]  2016    Dr Frank Marmolejo, scalp lesion , squamous cell cancer     MOUTH SURGERY Left 3/19/2021    EXCISE LESION LEFT ORAL TONGUE performed by Grace Taylor MD at [address] [place] [address]                                                     Current Outpatient Medications   Medication Sig Dispense Refill    levothyroxine (SYNTHROID) 50 MCG tablet TAKE 1 TABLET BY MOUTH EVERY DAY AT NIGHT 90 tablet 3    vitamin D 1000 UNITS CAPS Take 2,000 Units by mouth nightly        No current facility-administered medications for this visit.        Allergies   Allergen Reactions    Pcn [Penicillins] Other (See Comments)     Childhood BLACK TONGUE        Social History     Tobacco Use    Smoking status: Never Smoker    Smokeless tobacco: Never Used   Substance Use Topics    Alcohol use: No        Family History   Problem Relation Age of Onset    No Known Problems Mother     No Known Problems Father         Review Of Systems    Skin: no abnormal pigmentation, rash, scaling, itching, masses, hair or nail changes  Eyes: negative  Ears/Nose/Throat: left tongue lesion  Respiratory: negative  Cardiovascular: negative  Gastrointestinal: negative  Genitourinary: negative  Musculoskeletal: negative  Neurologic: negative  Psychiatric: negative  Hematologic/Lymphatic/Immunologic: negative  Endocrine: negative    PHYSICAL EXAMINATION:  /76 (Site: Left Upper Arm)   Temp 98.4 °F (36.9 °C)   Ht 5' 8\" (1.727 m)   Wt 205 lb (93 kg)   BMI 31.17 kg/m²   General appearance - healthy, alert, no distress  Skin - Skin color, texture, turgor normal. No rashes or lesions. Head - Normocephalic. No masses, lesions, tenderness or abnormalities  Eyes - conjunctivae/corneas clear. PERRL, EOM's intact. Ears - External ears normal. Canals clear. TM's normal.  Nose/Sinuses - Nares normal. Septum midline. Mucosa normal. No drainage or sinus tenderness. Oropharynx - Lips, mucosa, and tongue normal. Teeth and gums normal. Orop  Neck - Neck supple. No adenopathy. Thyroid symmetric, normal size,  Back - Back symmetric, no curvature. ROM normal. No CVA tenderness. Lungs - Percussion normal. Good diaphragmatic excursion. Lungs clear  Heart - Regular rate and rhythm, with no rub, murmur or gallop noted. Abdomen - Abdomen soft, non-tender. BS normal. No masses, organomegaly  Extremities - Extremities normal. No deformities, edema, or skin discolora  Musculoskeletal - Spine ROM normal. Muscular strength intact. Peripheral pulses - radial=4/4,, femoral=4/4, popliteal=4/4, dorsalis pedis=4/4,  Neuro - Gait normal. Reflexes normal and symmetric. Sensation grossly normal.  No focal weakness      ASSESSMENT and PLAN:    1. Pre-op exam  I was asked to examine Rocio Gonsalves by Dr. Logan Vincent prior to his tongue surgery.    Medication adjustment: Rocio Gonsalves should not take any medications on the morning of surgery  Cardioprotection: None indicated    2. 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 3. Hypothyroidism due to acquired atrophy of thyroid  stable hypothyroidism treated with thyroid replacement. He is medically cleared for surgery and anesthesia.     Copy to Dr. Sy Amaya

## 2021-09-29 ENCOUNTER — ANESTHESIA EVENT (OUTPATIENT)
Dept: OPERATING ROOM | Age: 71
End: 2021-09-29
Payer: COMMERCIAL

## 2021-09-30 ENCOUNTER — ANESTHESIA (OUTPATIENT)
Dept: OPERATING ROOM | Age: 71
End: 2021-09-30
Payer: COMMERCIAL

## 2021-09-30 ENCOUNTER — HOSPITAL ENCOUNTER (OUTPATIENT)
Age: 71
Setting detail: OUTPATIENT SURGERY
Discharge: HOME | End: 2021-09-30
Attending: OTOLARYNGOLOGY | Admitting: OTOLARYNGOLOGY
Payer: COMMERCIAL

## 2021-09-30 VITALS
HEART RATE: 81 BPM | DIASTOLIC BLOOD PRESSURE: 89 MMHG | RESPIRATION RATE: 15 BRPM | WEIGHT: 200 LBS | BODY MASS INDEX: 30.31 KG/M2 | OXYGEN SATURATION: 94 % | HEIGHT: 68 IN | TEMPERATURE: 97 F | SYSTOLIC BLOOD PRESSURE: 143 MMHG

## 2021-09-30 VITALS — TEMPERATURE: 96.8 F | DIASTOLIC BLOOD PRESSURE: 86 MMHG | OXYGEN SATURATION: 92 % | SYSTOLIC BLOOD PRESSURE: 156 MMHG

## 2021-09-30 DIAGNOSIS — K14.8 TONGUE LESION: ICD-10-CM

## 2021-09-30 DIAGNOSIS — R20.2 PARESTHESIAS: ICD-10-CM

## 2021-09-30 DIAGNOSIS — K14.8 LESION OF TONGUE: Primary | ICD-10-CM

## 2021-09-30 PROCEDURE — 2580000003 HC RX 258: Performed by: OTOLARYNGOLOGY

## 2021-09-30 PROCEDURE — 3600000014 HC SURGERY LEVEL 4 ADDTL 15MIN: Performed by: OTOLARYNGOLOGY

## 2021-09-30 PROCEDURE — 7100000011 HC PHASE II RECOVERY - ADDTL 15 MIN: Performed by: OTOLARYNGOLOGY

## 2021-09-30 PROCEDURE — 7100000001 HC PACU RECOVERY - ADDTL 15 MIN: Performed by: OTOLARYNGOLOGY

## 2021-09-30 PROCEDURE — 2580000003 HC RX 258: Performed by: ANESTHESIOLOGY

## 2021-09-30 PROCEDURE — 3700000001 HC ADD 15 MINUTES (ANESTHESIA): Performed by: OTOLARYNGOLOGY

## 2021-09-30 PROCEDURE — 6370000000 HC RX 637 (ALT 250 FOR IP): Performed by: OTOLARYNGOLOGY

## 2021-09-30 PROCEDURE — 7100000000 HC PACU RECOVERY - FIRST 15 MIN: Performed by: OTOLARYNGOLOGY

## 2021-09-30 PROCEDURE — 6360000002 HC RX W HCPCS: Performed by: NURSE ANESTHETIST, CERTIFIED REGISTERED

## 2021-09-30 PROCEDURE — 3700000000 HC ANESTHESIA ATTENDED CARE: Performed by: OTOLARYNGOLOGY

## 2021-09-30 PROCEDURE — 2500000003 HC RX 250 WO HCPCS: Performed by: NURSE ANESTHETIST, CERTIFIED REGISTERED

## 2021-09-30 PROCEDURE — 2500000003 HC RX 250 WO HCPCS: Performed by: OTOLARYNGOLOGY

## 2021-09-30 PROCEDURE — 7100000010 HC PHASE II RECOVERY - FIRST 15 MIN: Performed by: OTOLARYNGOLOGY

## 2021-09-30 PROCEDURE — 41120 PARTIAL REMOVAL OF TONGUE: CPT | Performed by: OTOLARYNGOLOGY

## 2021-09-30 PROCEDURE — 88305 TISSUE EXAM BY PATHOLOGIST: CPT

## 2021-09-30 PROCEDURE — 3600000004 HC SURGERY LEVEL 4 BASE: Performed by: OTOLARYNGOLOGY

## 2021-09-30 PROCEDURE — 2709999900 HC NON-CHARGEABLE SUPPLY: Performed by: OTOLARYNGOLOGY

## 2021-09-30 RX ORDER — ENALAPRILAT 2.5 MG/2ML
1.25 INJECTION INTRAVENOUS
Status: DISCONTINUED | OUTPATIENT
Start: 2021-09-30 | End: 2021-09-30 | Stop reason: HOSPADM

## 2021-09-30 RX ORDER — LIDOCAINE HYDROCHLORIDE AND EPINEPHRINE 10; 10 MG/ML; UG/ML
INJECTION, SOLUTION INFILTRATION; PERINEURAL PRN
Status: DISCONTINUED | OUTPATIENT
Start: 2021-09-30 | End: 2021-09-30 | Stop reason: ALTCHOICE

## 2021-09-30 RX ORDER — SODIUM CHLORIDE 9 MG/ML
25 INJECTION, SOLUTION INTRAVENOUS PRN
Status: DISCONTINUED | OUTPATIENT
Start: 2021-09-30 | End: 2021-09-30 | Stop reason: HOSPADM

## 2021-09-30 RX ORDER — SODIUM CHLORIDE, SODIUM LACTATE, POTASSIUM CHLORIDE, CALCIUM CHLORIDE 600; 310; 30; 20 MG/100ML; MG/100ML; MG/100ML; MG/100ML
INJECTION, SOLUTION INTRAVENOUS CONTINUOUS
Status: DISCONTINUED | OUTPATIENT
Start: 2021-09-30 | End: 2021-09-30 | Stop reason: HOSPADM

## 2021-09-30 RX ORDER — FENTANYL CITRATE 50 UG/ML
25 INJECTION, SOLUTION INTRAMUSCULAR; INTRAVENOUS EVERY 5 MIN PRN
Status: DISCONTINUED | OUTPATIENT
Start: 2021-09-30 | End: 2021-09-30 | Stop reason: HOSPADM

## 2021-09-30 RX ORDER — FENTANYL CITRATE 50 UG/ML
INJECTION, SOLUTION INTRAMUSCULAR; INTRAVENOUS PRN
Status: DISCONTINUED | OUTPATIENT
Start: 2021-09-30 | End: 2021-09-30 | Stop reason: SDUPTHER

## 2021-09-30 RX ORDER — SODIUM CHLORIDE 0.9 % (FLUSH) 0.9 %
10 SYRINGE (ML) INJECTION PRN
Status: DISCONTINUED | OUTPATIENT
Start: 2021-09-30 | End: 2021-09-30 | Stop reason: HOSPADM

## 2021-09-30 RX ORDER — LIDOCAINE HYDROCHLORIDE 10 MG/ML
1 INJECTION, SOLUTION EPIDURAL; INFILTRATION; INTRACAUDAL; PERINEURAL
Status: DISCONTINUED | OUTPATIENT
Start: 2021-09-30 | End: 2021-09-30 | Stop reason: HOSPADM

## 2021-09-30 RX ORDER — SUCCINYLCHOLINE/SOD CL,ISO/PF 200MG/10ML
SYRINGE (ML) INTRAVENOUS PRN
Status: DISCONTINUED | OUTPATIENT
Start: 2021-09-30 | End: 2021-09-30 | Stop reason: SDUPTHER

## 2021-09-30 RX ORDER — MAGNESIUM HYDROXIDE 1200 MG/15ML
LIQUID ORAL CONTINUOUS PRN
Status: COMPLETED | OUTPATIENT
Start: 2021-09-30 | End: 2021-09-30

## 2021-09-30 RX ORDER — PROPOFOL 10 MG/ML
INJECTION, EMULSION INTRAVENOUS PRN
Status: DISCONTINUED | OUTPATIENT
Start: 2021-09-30 | End: 2021-09-30 | Stop reason: SDUPTHER

## 2021-09-30 RX ORDER — HYDRALAZINE HYDROCHLORIDE 20 MG/ML
5 INJECTION INTRAMUSCULAR; INTRAVENOUS EVERY 5 MIN PRN
Status: DISCONTINUED | OUTPATIENT
Start: 2021-09-30 | End: 2021-09-30 | Stop reason: HOSPADM

## 2021-09-30 RX ORDER — LIDOCAINE HCL/PF 100 MG/5ML
SYRINGE (ML) INJECTION PRN
Status: DISCONTINUED | OUTPATIENT
Start: 2021-09-30 | End: 2021-09-30 | Stop reason: SDUPTHER

## 2021-09-30 RX ORDER — ONDANSETRON 2 MG/ML
INJECTION INTRAMUSCULAR; INTRAVENOUS PRN
Status: DISCONTINUED | OUTPATIENT
Start: 2021-09-30 | End: 2021-09-30 | Stop reason: SDUPTHER

## 2021-09-30 RX ORDER — ROCURONIUM BROMIDE 10 MG/ML
INJECTION, SOLUTION INTRAVENOUS PRN
Status: DISCONTINUED | OUTPATIENT
Start: 2021-09-30 | End: 2021-09-30 | Stop reason: SDUPTHER

## 2021-09-30 RX ORDER — FENTANYL CITRATE 50 UG/ML
50 INJECTION, SOLUTION INTRAMUSCULAR; INTRAVENOUS EVERY 5 MIN PRN
Status: DISCONTINUED | OUTPATIENT
Start: 2021-09-30 | End: 2021-09-30 | Stop reason: HOSPADM

## 2021-09-30 RX ORDER — SODIUM CHLORIDE 0.9 % (FLUSH) 0.9 %
10 SYRINGE (ML) INJECTION EVERY 12 HOURS SCHEDULED
Status: DISCONTINUED | OUTPATIENT
Start: 2021-09-30 | End: 2021-09-30 | Stop reason: HOSPADM

## 2021-09-30 RX ORDER — GLYCOPYRROLATE 1 MG/5 ML
SYRINGE (ML) INTRAVENOUS PRN
Status: DISCONTINUED | OUTPATIENT
Start: 2021-09-30 | End: 2021-09-30 | Stop reason: SDUPTHER

## 2021-09-30 RX ORDER — DEXAMETHASONE SODIUM PHOSPHATE 4 MG/ML
INJECTION, SOLUTION INTRA-ARTICULAR; INTRALESIONAL; INTRAMUSCULAR; INTRAVENOUS; SOFT TISSUE PRN
Status: DISCONTINUED | OUTPATIENT
Start: 2021-09-30 | End: 2021-09-30 | Stop reason: SDUPTHER

## 2021-09-30 RX ORDER — ONDANSETRON 2 MG/ML
4 INJECTION INTRAMUSCULAR; INTRAVENOUS
Status: DISCONTINUED | OUTPATIENT
Start: 2021-09-30 | End: 2021-09-30 | Stop reason: HOSPADM

## 2021-09-30 RX ORDER — LABETALOL HYDROCHLORIDE 5 MG/ML
5 INJECTION, SOLUTION INTRAVENOUS EVERY 10 MIN PRN
Status: DISCONTINUED | OUTPATIENT
Start: 2021-09-30 | End: 2021-09-30 | Stop reason: HOSPADM

## 2021-09-30 RX ORDER — OXYCODONE HYDROCHLORIDE AND ACETAMINOPHEN 5; 325 MG/1; MG/1
1 TABLET ORAL EVERY 4 HOURS PRN
Qty: 25 TABLET | Refills: 0 | Status: SHIPPED | OUTPATIENT
Start: 2021-09-30 | End: 2021-10-05

## 2021-09-30 RX ORDER — MIDAZOLAM HYDROCHLORIDE 1 MG/ML
INJECTION INTRAMUSCULAR; INTRAVENOUS PRN
Status: DISCONTINUED | OUTPATIENT
Start: 2021-09-30 | End: 2021-09-30 | Stop reason: SDUPTHER

## 2021-09-30 RX ADMIN — ROCURONIUM BROMIDE 5 MG: 10 INJECTION INTRAVENOUS at 09:25

## 2021-09-30 RX ADMIN — PROPOFOL 200 MG: 10 INJECTION, EMULSION INTRAVENOUS at 09:23

## 2021-09-30 RX ADMIN — FENTANYL CITRATE 25 MCG: 50 INJECTION, SOLUTION INTRAMUSCULAR; INTRAVENOUS at 09:22

## 2021-09-30 RX ADMIN — MIDAZOLAM HYDROCHLORIDE 1 MG: 2 INJECTION, SOLUTION INTRAMUSCULAR; INTRAVENOUS at 09:12

## 2021-09-30 RX ADMIN — PHENYLEPHRINE HYDROCHLORIDE 100 MCG: 10 INJECTION, SOLUTION INTRAMUSCULAR; INTRAVENOUS; SUBCUTANEOUS at 09:46

## 2021-09-30 RX ADMIN — Medication 140 MG: at 09:24

## 2021-09-30 RX ADMIN — ROCURONIUM BROMIDE 45 MG: 10 INJECTION INTRAVENOUS at 09:27

## 2021-09-30 RX ADMIN — PROPOFOL 50 MG: 10 INJECTION, EMULSION INTRAVENOUS at 09:24

## 2021-09-30 RX ADMIN — Medication 0.2 MG: at 09:12

## 2021-09-30 RX ADMIN — PHENYLEPHRINE HYDROCHLORIDE 100 MCG: 10 INJECTION, SOLUTION INTRAMUSCULAR; INTRAVENOUS; SUBCUTANEOUS at 09:48

## 2021-09-30 RX ADMIN — ONDANSETRON 4 MG: 2 INJECTION INTRAMUSCULAR; INTRAVENOUS at 09:30

## 2021-09-30 RX ADMIN — BENZOCAINE AND MENTHOL 1 LOZENGE: 15; 3.6 LOZENGE ORAL at 10:30

## 2021-09-30 RX ADMIN — Medication 100 MG: at 09:21

## 2021-09-30 RX ADMIN — SODIUM CHLORIDE, POTASSIUM CHLORIDE, SODIUM LACTATE AND CALCIUM CHLORIDE: 600; 310; 30; 20 INJECTION, SOLUTION INTRAVENOUS at 08:29

## 2021-09-30 RX ADMIN — SODIUM CHLORIDE, POTASSIUM CHLORIDE, SODIUM LACTATE AND CALCIUM CHLORIDE: 600; 310; 30; 20 INJECTION, SOLUTION INTRAVENOUS at 10:08

## 2021-09-30 RX ADMIN — DEXAMETHASONE SODIUM PHOSPHATE 10 MG: 4 INJECTION, SOLUTION INTRAMUSCULAR; INTRAVENOUS at 09:30

## 2021-09-30 RX ADMIN — SUGAMMADEX 200 MG: 100 INJECTION, SOLUTION INTRAVENOUS at 09:58

## 2021-09-30 RX ADMIN — PHENYLEPHRINE HYDROCHLORIDE 100 MCG: 10 INJECTION, SOLUTION INTRAMUSCULAR; INTRAVENOUS; SUBCUTANEOUS at 09:49

## 2021-09-30 ASSESSMENT — PULMONARY FUNCTION TESTS
PIF_VALUE: 13
PIF_VALUE: 12
PIF_VALUE: 17
PIF_VALUE: 24
PIF_VALUE: 14
PIF_VALUE: 0
PIF_VALUE: 14
PIF_VALUE: 13
PIF_VALUE: 1
PIF_VALUE: 4
PIF_VALUE: 12
PIF_VALUE: 17
PIF_VALUE: 27
PIF_VALUE: 1
PIF_VALUE: 17
PIF_VALUE: 2
PIF_VALUE: 17
PIF_VALUE: 17
PIF_VALUE: 25
PIF_VALUE: 17
PIF_VALUE: 14
PIF_VALUE: 17
PIF_VALUE: 12
PIF_VALUE: 14
PIF_VALUE: 17
PIF_VALUE: 14
PIF_VALUE: 17
PIF_VALUE: 14
PIF_VALUE: 14
PIF_VALUE: 17
PIF_VALUE: 15
PIF_VALUE: 17
PIF_VALUE: 1
PIF_VALUE: 17
PIF_VALUE: 1
PIF_VALUE: 17
PIF_VALUE: 16
PIF_VALUE: 17
PIF_VALUE: 1
PIF_VALUE: 17

## 2021-09-30 ASSESSMENT — PAIN SCALES - GENERAL
PAINLEVEL_OUTOF10: 0

## 2021-09-30 ASSESSMENT — PAIN - FUNCTIONAL ASSESSMENT: PAIN_FUNCTIONAL_ASSESSMENT: 0-10

## 2021-09-30 NOTE — BRIEF OP NOTE
Brief Postoperative Note      Patient: Mickey Beatty  YOB: 1950  MRN: [id number]    Date of Procedure: 9/30/2021    Pre-Op Diagnosis: HISTORY CARCINOMA TONGUE, LEUKOPLAKIA LEFT ORAL TONGUE    Post-Op Diagnosis: Same       Procedure(s):  PARTIAL RESECTION OF LEFT ORAL TONGUE    Surgeon(s): Lopez Hinojosa MD    Assistant:  Resident: Bonnie Marcus DO    Anesthesia: General    Estimated Blood Loss (mL): Minimal    Complications: None    Specimens:   ID Type Source Tests Collected by Time Destination   A : A.  PARTIAL LEFT TONGUE (suture is anterior) Tissue Tissue SURGICAL PATHOLOGY Lopez Hinojosa MD 9/30/2021 2616    B : B. SUPERIOR POSTERIOR MARGIN LEFT TONGUE Tissue Tissue SURGICAL PATHOLOGY Lopez Hinojosa MD 9/30/2021 0945        Implants:  * No implants in log *      Drains: * No LDAs found *      Electronically signed by Lopez Hinojosa MD on 9/30/2021 at 10:13 AM

## 2021-09-30 NOTE — PROGRESS NOTES
PACU Transfer to [facility]    Vitals:    09/30/21 1047   BP:    Pulse: 91   Resp:    Temp:    SpO2:      See pacu vitals flowsheet    Intake/Output Summary (Last 24 hours) at 9/30/2021 1053  Last data filed at 9/30/2021 1008  Gross per 24 hour   Intake 800 ml   Output 5 ml   Net 795 ml       Pain assessment:  No surgical pain, throat soreness from ETT. Cepacol provided with postive relief. Pain Level: 0    Patient transferred to care of [facility] DYLAN Mcneil via telephone.   9/30/2021 10:53 AM

## 2021-09-30 NOTE — OP NOTE
[address]                                OPERATIVE REPORT    PATIENT NAME: Jacky Bryant                   :        1950  MED REC NO:   [id number]                          ROOM:  ACCOUNT NO:   [de-identified]                           ADMIT DATE: 2021  PROVIDER:     Amrik Dickson MD    DATE OF PROCEDURE:  2021    PREOPERATIVE DIAGNOSIS:  History of carcinoma of the left oral tongue. POSTOPERATIVE DIAGNOSIS:  History of carcinoma of the left oral tongue. OPERATION:  Partial glossectomy, left. SURGEON:  Amrik Dickson MD    ANESTHESIA:  General with endotracheal intubation. PROCEDURE:  Under satisfactory general anesthesia, the patient's mouth  was opened with a Rogers mouth gag. The tongue was grasped with a  perforating towel clip and retracted anteriorly and to the right  exposing the area of leukoplakia on the left oral tongue. The patient  has had a carcinoma excised 6 months ago. The excision site with the  margins were clear for carcinoma, but there was an atypia anterior to  the carcinoma. The area of leukoplakia was excised in an elliptical  fashion. The incision was done using a cautery pencil set at 25 cut and  25 spray, blend 1. Care was taken to remove all tissue that showed any  leukoplakia. The incision was carried deep to the musculature with the  cautery pencil. There was one area that appeared a little more inflamed  than the rest posteriorly and superiorly and for this reason an  additional margin of posterior, superior tissue was taken. The specimen  was delivered to the pathologist personally to orient him to the  specimen. The incision was loosely approximated with multiple sutures  of 3-0 Vicryl. The patient was also given Decadron to diminish  post-resection swelling.   The patient tolerated the procedure well and  was transferred to the recovery room in good condition.   Estimated blood  loss was minimal.          Jacqueline Butcher MD    D: 09/30/2021 10:20:02       T: 09/30/2021 10:22:25     [id number]  Job#: [id number]     Doc#: [id number]    CC:

## 2021-09-30 NOTE — ANESTHESIA PRE PROCEDURE
Department of Anesthesiology  Preprocedure Note       Name:  Elisabet Siddiqi   Age:  66 y.o.  :  1950                                          MRN:  [id number]         Date:  2021      Surgeon: Va Cole): Dorothea Hansen MD    Procedure: Procedure(s):  PARTIAL GLOSSECTOMY, LEFT    Medications prior to admission:   Prior to Admission medications    Medication Sig Start Date End Date Taking? Authorizing Provider   levothyroxine (SYNTHROID) 50 MCG tablet TAKE 1 TABLET BY MOUTH EVERY DAY AT NIGHT 21  Yes Huston Saint, MD   vitamin D 1000 UNITS CAPS Take 2,000 Units by mouth nightly    Yes Historical Provider, MD       Current medications:    Current Facility-Administered Medications   Medication Dose Route Frequency Provider Last Rate Last Admin    lactated ringers infusion   IntraVENous Continuous Isela Pedro Exon, DO        lactated ringers infusion   IntraVENous Continuous Bailey Gamez MD        sodium chloride flush 0.9 % injection 10 mL  10 mL IntraVENous 2 times per day Bailey Gamez MD        sodium chloride flush 0.9 % injection 10 mL  10 mL IntraVENous PRN Bailey Gamez MD        0.9 % sodium chloride infusion  25 mL IntraVENous PRN Bailey Gamez MD        lidocaine PF 1 % injection 1 mL  1 mL IntraDERmal Once PRN Bailey Gamez MD           Allergies:     Allergies   Allergen Reactions    Pcn [Penicillins] Other (See Comments)     Childhood BLACK TONGUE        Problem List:    Patient Active Problem List   Diagnosis Code    Hypothyroidism E03.9    Vitamin D deficiency E55.9    Heart palpitations R00.2    Myalgia M79.10    Pain of both wrist joints M25.531, M25.532    Paresthesias R20.2    Pes planus of both feet M21.41, M21.42    Pain in joint M25.50    Sicca syndrome ([address] M35.00    Kidney stone N20.0    Hydronephrosis with ureteropelvic junction (UPJ) obstruction Q62.11    Right flank pain R10.9    Elevated blood pressure reading without diagnosis of kg/m².    CBC:   Lab Results   Component Value Date    WBC 7.9 09/28/2020    RBC 4.68 09/28/2020    HGB 15.7 09/28/2020    HCT 45.6 09/28/2020    MCV 97.5 09/28/2020    RDW 14.1 09/28/2020     09/28/2020       CMP:   Lab Results   Component Value Date     09/28/2020    K 4.3 09/28/2020    K 3.9 05/15/2020     09/28/2020    CO2 24 09/28/2020    BUN 16 09/28/2020    CREATININE 0.9 09/28/2020    GFRAA >60 09/28/2020    GFRAA >60 05/08/2012    AGRATIO 2.4 09/28/2020    LABGLOM >60 09/28/2020    GLUCOSE 91 09/28/2020    PROT 7.1 09/28/2020    PROT 7.1 05/08/2012    CALCIUM 9.5 09/28/2020    BILITOT 0.7 09/28/2020    ALKPHOS 81 09/28/2020    AST 24 09/28/2020    ALT 29 09/28/2020       POC Tests: No results for input(s): POCGLU, POCNA, POCK, POCCL, POCBUN, POCHEMO, POCHCT in the last 72 hours. Coags: No results found for: PROTIME, INR, APTT    HCG (If Applicable): No results found for: PREGTESTUR, PREGSERUM, HCG, HCGQUANT     ABGs: No results found for: PHART, PO2ART, VPF5YDY, SJD9HNG, BEART, M7PDATYZ     Type & Screen (If Applicable):  No results found for: LABABO, LABRH    Drug/Infectious Status (If Applicable):  No results found for: HIV, HEPCAB    COVID-19 Screening (If Applicable):   Lab Results   Component Value Date    COVID19 Not Detected 03/15/2021           Anesthesia Evaluation  Patient summary reviewed no history of anesthetic complications:   Airway: Mallampati: III  TM distance: >3 FB   Neck ROM: full  Mouth opening: > = 3 FB Dental: normal exam         Pulmonary:                              Cardiovascular:                      Neuro/Psych:               GI/Hepatic/Renal:   (+) renal disease: kidney stones,           Endo/Other:    (+) hypothyroidism: arthritis:., .                  ROS comment: Dry  eyes Abdominal:             Vascular: Other Findings:             Anesthesia Plan      general     ASA 2       Induction: intravenous.       Anesthetic plan and risks discussed with patient.                       Jose Harris MD   9/30/2021

## 2021-09-30 NOTE — ANESTHESIA POSTPROCEDURE EVALUATION
Department of Anesthesiology  Postprocedure Note    Patient: Marcin Ba  MRN: [id number]  YOB: 1950  Date of evaluation: 9/30/2021  Time:  11:29 AM     Procedure Summary     Date: 09/30/21 Room / Location: [facility] [address] / The [address] [place] [address],[address]    Anesthesia Start: 5540 Anesthesia Stop: 3260    Procedure: PARTIAL RESECTION OF LEFT ORAL TONGUE (Left ) Diagnosis:       Tongue lesion      (Tongue lesion [K14.8])    Surgeons: Yasmine Rodriguez MD Responsible Provider: Cris Martinez MD    Anesthesia Type: general ASA Status: 2          Anesthesia Type: general    Miky Phase I: Miky Score: 10    Miky Phase II: Miky Score: 10    Last vitals: Reviewed and per EMR flowsheets.        Anesthesia Post Evaluation    Patient participation: complete - patient participated  Level of consciousness: awake  Airway patency: patent  Nausea & Vomiting: no nausea and no vomiting  Complications: no  Cardiovascular status: hemodynamically stable  Respiratory status: acceptable  Hydration status: stable

## 2021-09-30 NOTE — PROGRESS NOTES
Ambulatory Surgery/Procedure Discharge Note    Vitals:    09/30/21 1057   BP: (!) 143/89   Pulse: 81   Resp: 15   Temp: 97 °F (36.1 °C)   SpO2: 94%       In: 800 [I.V.:800]  Out: 5     Restroom use offered before discharge. Yes  Pt remains safe and toileting bathroom privileges. C/o sore throat and surgical site and incision intact. Tolerates PO well and lozenges and script altogether on chart. Discharge instructions were read at bedside. Friend is only for ride . Pain assessment:  none  Pain Level: 0        Patient discharged to home/self care.  Patient discharged via wheel chair by this nurse to waiting family/S.O.       9/30/2021 11:19 AM

## 2021-09-30 NOTE — PROGRESS NOTES
From OR to [facility] accompanied by Shannan CORDOVA and monitors applied. Intraop meds discussed and pt arrives able to state needs and denies tongue pain.   C/O sore throat

## 2021-09-30 NOTE — BRIEF OP NOTE
Brief Postoperative Note      Patient: Jori Rebolledo  YOB: 1950  MRN: [id number]    Date of Procedure: 9/30/2021    Pre-Op Diagnosis: Tongue lesion [K14.8]; Leukoplakia of L tongue    Post-Op Diagnosis: Same       Procedure(s):  PARTIAL RESECTION OF LEFT ORAL TONGUE    Surgeon(s): Akira Negro MD    Assistant:  Resident: Franco Green DO    Anesthesia: General    Estimated Blood Loss (mL): Minimal    Complications: None    Specimens:   ID Type Source Tests Collected by Time Destination   A : A.  PARTIAL LEFT TONGUE (suture is anterior) Tissue Tissue SURGICAL PATHOLOGY Akira Negro MD 9/30/2021 8480    B : B. SUPERIOR POSTERIOR MARGIN LEFT TONGUE Tissue Tissue SURGICAL PATHOLOGY Akira Negro MD 9/30/2021 5323        Implants:  * No implants in log *      Drains: * No LDAs found *    Findings: partial resection of L tongue    Electronically signed by Tonia Kline DO on 9/30/2021 at 9:59 AM

## 2021-09-30 NOTE — H&P
Pillo Fernandez    [id number]    [facility] Same Day Surgery Update H & P  Department of General Surgery   Surgical Service   Pre-operative History and Physical  Last H & P within the last 30 days. DIAGNOSIS:   Tongue lesion [K14.8]    Procedure(s):  PARTIAL GLOSSECTOMY, LEFT    History obtained from: Patient interview and EHR      HISTORY OF PRESENT ILLNESS:   Patient with squamous cell carcinoma of the tongue presents today for the above procedure. Covid 19:  Patient denies fever, chills, worsening cough, or known exposure to Covid-19.        Past Medical History:        Diagnosis Date    Cancer [facility])     skin cancer    Hyperlipidemia     Kidney stone 05/13/2020    Scalp lesion 2017    squamous cell Ca    Sinus congestion     Thyroid disease      Past Surgical History:        Procedure Laterality Date    COLONOSCOPY  04/04/2005    COLONOSCOPY  01/05/2018    small polyps 5-10 years adenomatousvs hyperplastic    CYSTOSCOPY Right 05/14/2020    CYSTOSCOPY, RIGHT RETROGRADE PYLOGRAM, RIGHT URETEROSCOPY, LASER, STONE MANIPULATION AND EXTRACTION AND RIGHT STENT INSERTION performed by Lucy Canales MD at [place] [address]    Dr Dallin Hastings, scalp lesion , squamous cell cancer     MOUTH SURGERY Left 3/19/2021    EXCISE LESION LEFT ORAL TONGUE performed by Ash Dykes MD at [address] [place] [address]       Past Social History:  Social History     Socioeconomic History    Marital status: Single     Spouse name: None    Number of children: None    Years of education: None    Highest education level: None   Occupational History    Occupation:     Tobacco Use    Smoking status: Never Smoker    Smokeless tobacco: Never Used   Vaping Use    Vaping Use: Never used   Substance and Sexual Activity    Alcohol use: No    Drug use: No    Sexual activity: None   Other Topics Concern    None   Social History Narrative    None     Social Determinants of Health     Financial Resource Strain: Low Risk     Difficulty of Paying Living Expenses: Not hard at all   Food Insecurity: No Food Insecurity    Worried About Running Out of Food in the Last Year: Never true    Richard of Food in the Last Year: Never true   Transportation Needs:     Lack of Transportation (Medical):  Lack of Transportation (Non-Medical):    Physical Activity:     Days of Exercise per Week:     Minutes of Exercise per Session:    Stress:     Feeling of Stress :    Social Connections:     Frequency of Communication with Friends and Family:     Frequency of Social Gatherings with Friends and Family:     Attends Church Services:     Active Member of Clubs or Organizations:     Attends Club or Organization Meetings:     Marital Status:    Intimate Partner Violence:     Fear of Current or Ex-Partner:     Emotionally Abused:     Physically Abused:     Sexually Abused:          Medications Prior to Admission:      Prior to Admission medications    Medication Sig Start Date End Date Taking? Authorizing Provider   levothyroxine (SYNTHROID) 50 MCG tablet TAKE 1 TABLET BY MOUTH EVERY DAY AT NIGHT 7/7/21  Yes Jolie Mosqueda MD   vitamin D 1000 UNITS CAPS Take 2,000 Units by mouth nightly    Yes Historical Provider, MD         Allergies:  Pcn [penicillins]    PHYSICAL EXAM:      BP (!) 150/105   Pulse 106   Temp 98.8 °F (37.1 °C) (Oral)   Resp 16   Ht 5' 8\" (1.727 m)   Wt 200 lb (90.7 kg)   SpO2 96%   BMI 30.41 kg/m²      Airway:  Airway patent with no audible stridor    Heart:  Tachycardia, Regular rhythm, No murmur noted    Lungs:  No increased work of breathing, good air exchange, clear to auscultation bilaterally, no crackles or wheezing    Abdomen:  Soft, non-distended, non-tender, no rebound tenderness or guarding, and no masses palpated    ASSESSMENT AND PLAN     Patient is a 66 y.o. male with above specified procedure planned.     1.  The patients history and physical was obtained and signed off by the pre-admission testing department. Patient seen and focused exam done today- no new changes since last physical exam on 9/28/21    2. Access to ancillary services are available per request of the provider.     GOLDY Bobby - CNP     9/30/2021

## 2021-09-30 NOTE — PROGRESS NOTES
0800 Dr. Stephanie Fields aware of pt with elevated BP of 158/104 manually in Left upper arm with HR 96 and elevated BP of 160/100 and HR 98 manually in Right Upper arm and pt states 'no history of HTN' with no orders noted.

## 2021-10-06 ENCOUNTER — OFFICE VISIT (OUTPATIENT)
Dept: ENT CLINIC | Age: 71
End: 2021-10-06

## 2021-10-06 DIAGNOSIS — K14.8 LESION OF TONGUE: Primary | ICD-10-CM

## 2021-10-06 PROCEDURE — 99024 POSTOP FOLLOW-UP VISIT: CPT | Performed by: OTOLARYNGOLOGY

## 2021-10-06 RX ORDER — OXYCODONE HYDROCHLORIDE AND ACETAMINOPHEN 5; 325 MG/1; MG/1
1 TABLET ORAL EVERY 4 HOURS PRN
Qty: 25 TABLET | Refills: 0 | Status: SHIPPED | OUTPATIENT
Start: 2021-10-06 | End: 2021-10-11

## 2021-10-06 NOTE — PROGRESS NOTES
6 days following partial glossectomy left for patient with a history of squamous cell carcinoma of the tongue who has some leukoplakia anterior to the previous resection site. Pathology shows only low-grade dysplasia with no malignant involution. Lesion was totally excised. The patient is a little behind in fluids. He has a little separation at the suture line. Percocet prescribed for postop pain. Follow-up: 1 month.

## 2021-10-12 ENCOUNTER — OFFICE VISIT (OUTPATIENT)
Dept: INTERNAL MEDICINE CLINIC | Age: 71
End: 2021-10-12
Payer: COMMERCIAL

## 2021-10-12 VITALS
OXYGEN SATURATION: 99 % | SYSTOLIC BLOOD PRESSURE: 140 MMHG | WEIGHT: 199.8 LBS | TEMPERATURE: 98.2 F | HEART RATE: 81 BPM | DIASTOLIC BLOOD PRESSURE: 86 MMHG | HEIGHT: 68 IN | BODY MASS INDEX: 30.28 KG/M2

## 2021-10-12 DIAGNOSIS — Z12.5 SCREENING PSA (PROSTATE SPECIFIC ANTIGEN): ICD-10-CM

## 2021-10-12 DIAGNOSIS — E78.2 MIXED HYPERLIPIDEMIA: ICD-10-CM

## 2021-10-12 DIAGNOSIS — E03.4 HYPOTHYROIDISM DUE TO ACQUIRED ATROPHY OF THYROID: ICD-10-CM

## 2021-10-12 DIAGNOSIS — Z00.00 ROUTINE GENERAL MEDICAL EXAMINATION AT A HEALTH CARE FACILITY: Primary | ICD-10-CM

## 2021-10-12 DIAGNOSIS — R73.9 HYPERGLYCEMIA: ICD-10-CM

## 2021-10-12 DIAGNOSIS — R53.83 OTHER FATIGUE: ICD-10-CM

## 2021-10-12 PROCEDURE — G0438 PPPS, INITIAL VISIT: HCPCS | Performed by: INTERNAL MEDICINE

## 2021-10-12 ASSESSMENT — PATIENT HEALTH QUESTIONNAIRE - PHQ9
2. FEELING DOWN, DEPRESSED OR HOPELESS: 0
1. LITTLE INTEREST OR PLEASURE IN DOING THINGS: 0
SUM OF ALL RESPONSES TO PHQ9 QUESTIONS 1 & 2: 0
SUM OF ALL RESPONSES TO PHQ QUESTIONS 1-9: 0

## 2021-10-12 ASSESSMENT — LIFESTYLE VARIABLES
AUDIT TOTAL SCORE: INCOMPLETE
HOW OFTEN DO YOU HAVE A DRINK CONTAINING ALCOHOL: 0
AUDIT-C TOTAL SCORE: INCOMPLETE
HOW OFTEN DO YOU HAVE A DRINK CONTAINING ALCOHOL: NEVER

## 2021-10-12 NOTE — PROGRESS NOTES
Medicare Annual Wellness Visit  Name: Kaylin Clark Date: 10/12/2021   MRN: <[id number]> Sex: Male   Age: 66 y.o. Ethnicity: Non- / Non    : 1950 Race: White (non-)      Hayes Europe is here for Medicare AWV    Screenings for behavioral, psychosocial and functional/safety risks, and cognitive dysfunction are all negative except as indicated below. These results, as well as other patient data from the [address] form, are documented in Flowsheets linked to this Encounter. Allergies   Allergen Reactions    Pcn [Penicillins] Other (See Comments)     Childhood BLACK TONGUE          Prior to Visit Medications    Medication Sig Taking?  Authorizing Provider   levothyroxine (SYNTHROID) 50 MCG tablet TAKE 1 TABLET BY MOUTH EVERY DAY AT NIGHT Yes Maksim Snowden MD   vitamin D 1000 UNITS CAPS Take 2,000 Units by mouth nightly  Yes Historical Provider, MD         Past Medical History:   Diagnosis Date    Cancer ([address]     skin cancer    Hyperlipidemia     Kidney stone 2020    Scalp lesion 2017    squamous cell Ca    Sinus congestion     Thyroid disease        Past Surgical History:   Procedure Laterality Date    COLONOSCOPY  2005    COLONOSCOPY  2018    small polyps 5-10 years adenomatousvs hyperplastic    CYSTOSCOPY Right 2020    CYSTOSCOPY, RIGHT RETROGRADE PYLOGRAM, RIGHT URETEROSCOPY, LASER, STONE MANIPULATION AND EXTRACTION AND RIGHT STENT INSERTION performed by Jerome Mcknight MD at [address] [place] [address] GLOSSECTOMY Left 2021    PARTIAL RESECTION OF LEFT ORAL TONGUE performed [facility]  [facility]    Dr Tunde Qureshi, scalp lesion , squamous cell cancer     MOUTH SURGERY Left 3/19/2021    EXCISE LESION LEFT ORAL TONGUE performed by Louise Moore MD at [address] [place] [address]           Family History   Problem Relation Age of Onset    No Known Problems Mother     No Known surgeon. Extremities: no cyanosis, clubbing or edema  Musculoskeletal: normal range of motion, no joint swelling, deformity or tenderness  Neurologic: reflexes normal and symmetric, no cranial nerve deficit, gait, coordination and speech normal    Patient's complete Health Risk Assessment and screening values have been reviewed and are found in Flowsheets. The following problems were reviewed today and where indicated follow up appointments were made and/or referrals ordered. Positive Risk Factor Screenings with Interventions:            General Health and ACP:  General  In general, how would you say your health is?: Very Good  In the past 7 days, have you experienced any of the following?  New or Increased Pain, New or Increased Fatigue, Loneliness, Social Isolation, Stress or Anger?: (!) New or Increased Pain  Do you get the social and emotional support that you need?: Yes  Do you have a Living Will?: Yes  Advance Directives     Power of  Living Will ACP-Advance Directive ACP-Power of     Not on File Filed on 08/09/13 Filed Not on File      General Health Risk Interventions:  · Pain issues: mild right lower quadrant discomfort     Health Habits/Nutrition:  Health Habits/Nutrition  Do you exercise for at least 20 minutes 2-3 times per week?: (!) No  Have you lost any weight without trying in the past 3 months?: No  Do you eat only one meal per day?: No  Have you seen the dentist within the past year?: Yes  Body mass index: (!) 30.38  Health Habits/Nutrition Interventions:  · Nutritional issues:  educational materials for healthy, well-balanced diet provided   · Exercise recommended     Hearing/Vision:  No exam data present  Hearing/Vision  Do you or your family notice any trouble with your hearing that hasn't been managed with hearing aids?: No  Do you have difficulty driving, watching TV, or doing any of your daily activities because of your eyesight?: No  Have you had an eye exam within the schedule for the next 5-10 years is provided to the patient in written form: see Patient Claudine Ruvalcaba was seen today for medicare awv. Diagnoses and all orders for this visit:    Routine general medical examination at a health care facility    Hypothyroidism due to acquired atrophy of thyroid  -     T4, Free; Future  -     TSH without Reflex; Future    Mixed hyperlipidemia  -     Comprehensive Metabolic Panel; Future  -     Lipid Panel; Future    Screening PSA (prostate specific antigen)  -     PSA screening; Future    Other fatigue  -     CBC Auto Differential; Future  -     Comprehensive Metabolic Panel; Future    Hyperglycemia  -     Hemoglobin A1C; Future           1. Routine general medical examination at a health care facility  Stable. See orders. I suggested he should try to eat a healthy diet and reduce his weight. 2. Hypothyroidism due to acquired atrophy of thyroid  Stable. Check thyroid. Adjust therapy if needed. - T4, Free; Future  - TSH without Reflex; Future    3. Mixed hyperlipidemia  Stable. Check lipids. - Comprehensive Metabolic Panel; Future  - Lipid Panel; Future    4. Screening PSA (prostate specific antigen)  Stable. Check PSA.  - PSA screening; Future    5. Other fatigue  Stable. Check CBC.  - CBC Auto Differential; Future  - Comprehensive Metabolic Panel; Future    6. Hyperglycemia  Stable.   Check A1c.  - Hemoglobin A1C; Future

## 2021-10-12 NOTE — PATIENT INSTRUCTIONS
Personalized Preventive Plan for Ana Hernandez - 10/12/2021  Medicare offers a range of preventive health benefits. Some of the tests and screenings are paid in full while other may be subject to a deductible, co-insurance, and/or copay. Some of these benefits include a comprehensive review of your medical history including lifestyle, illnesses that may run in your family, and various assessments and screenings as appropriate. After reviewing your medical record and screening and assessments performed today your provider may have ordered immunizations, labs, imaging, and/or referrals for you. A list of these orders (if applicable) as well as your Preventive Care list are included within your After Visit Summary for your review. Other Preventive Recommendations:    · A preventive eye exam performed by an eye specialist is recommended every 1-2 years to screen for glaucoma; cataracts, macular degeneration, and other eye disorders. · A preventive dental visit is recommended every 6 months. · Try to get at least 150 minutes of exercise per week or 10,000 steps per day on a pedometer . · Order or download the FREE \"Exercise & Physical Activity: Your Everyday Guide\" from [employer]. Call 7-575.718.3161 or search [employer] online. · You need 8569-3285 mg of calcium and 8662-4435 IU of vitamin D per day. It is possible to meet your calcium requirement with diet alone, but a vitamin D supplement is usually necessary to meet this goal.  · When exposed to the sun, use a sunscreen that protects against both UVA and UVB radiation with an SPF of 30 or greater. Reapply every 2 to 3 hours or after sweating, drying off with a towel, or swimming. · Always wear a seat belt when traveling in a car. Always wear a helmet when riding a bicycle or motorcycle.

## 2021-10-27 NOTE — PROGRESS NOTES
FOLLOW UP VISIT:    CHIEF COMPLAINT: Tongue pain. INTERIM HISTORY: Painful area in the back of the tongue on the left side. Is been present for about 1 month. Patient feels a mass on the left side of the tongue near the junction of the tonsil pillar. His tongue mobility is not impaired. PAST MEDICAL HISTORY:   Social History     Tobacco Use   Smoking Status Never Smoker   Smokeless Tobacco Never Used                                                    Social History     Substance and Sexual Activity   Alcohol Use No                                                    Current Outpatient Medications:     levothyroxine (SYNTHROID) 50 MCG tablet, TAKE 1 TABLET EVERY DAY (Patient taking differently: Take 50 mcg by mouth nightly ), Disp: 90 tablet, Rfl: 1    vitamin D 1000 UNITS CAPS, Take 2,000 Units by mouth nightly , Disp: , Rfl:                                                  Past Medical History:   Diagnosis Date    Hyperlipidemia     Kidney stone 5/13/2020    Scalp lesion 2017    squamous cell Ca    Thyroid disease                                                     Past Surgical History:   Procedure Laterality Date    COLONOSCOPY  4/4/2005    COLONOSCOPY  01/05/2018    small polyps 5-10 years adenomatousvs hyperplastic    CYSTOSCOPY Right 5/14/2020    CYSTOSCOPY, RIGHT RETROGRADE PYLOGRAM, RIGHT URETEROSCOPY, LASER, STONE MANIPULATION AND EXTRACTION AND RIGHT STENT INSERTION performed by Fracisco Vera MD at [facility]  2016    Dr Chelsea Jay, scalp lesion , squamous cell cancer     SINUS SURGERY      TONSILLECTOMY         FAMILY HISTORY: Family history reviewed and except as pertinent to the interim history is not contributory. REVIEW OF SYSTEMS:  All pertinent positive and negative findings included in HPI. Otherwise, all other systems are reviewed and are negative    PHYSICAL EXAMINATION:   GENERAL: wdwn- no acute distress  RESPIRATORY: No stridor.   COMMUNICATION : Patient seen this morning briefly. Continue comfort medications. Patient is currently resting comfortably. On 8L HFNC. Will continue to decrease O2 as able.     Sienna Florian MD Normal voice  MENTAL STATUS: Alert and oriented x3  HEAD AND FACE: No skin lesions of the face. EXTERNAL EARS AND NOSE: The external ears and nasal pyramid are normal.  FACIAL MUSCLES: All branches of the facial nerve intact. FACE PALPATION: Zygomatic arches and orbital rims are intact. No tenderness over the sinuses. EXTRAOCULAR MUSCLES: Intact with full range of motion. OTOSCOPY:  Normal tympanic membranes, middle ear spaces, and external auditory canals. TUNING FORKS:  Rinne ++ Landers midline at 512 Hz  INTRANASAL:  Septum midline, turbinates normal, meati clear. LIPS, TEETH, GINGIVA:  Normal mucosa  PHARYNX: The lateral border of the oral tongue on the left has some white tissue which I believe represents Oakwood disease. I see no mass on the tongue nor do I palpate 1. NECK:  No masses. LYMPH NODES: No cervical lymphadenopathy. SALIVARY GLANDS: Parotid and submandibular glands normal.  THYROID: No goiter or thyroid nodules palpable. 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 PLAN: I have asked the patient to consult a dermatologist to see if he has any recommendations. FOLLOW-UP: As needed.

## 2021-11-03 ENCOUNTER — OFFICE VISIT (OUTPATIENT)
Dept: ENT CLINIC | Age: 71
End: 2021-11-03

## 2021-11-03 VITALS
BODY MASS INDEX: 30.22 KG/M2 | SYSTOLIC BLOOD PRESSURE: 135 MMHG | DIASTOLIC BLOOD PRESSURE: 90 MMHG | WEIGHT: 199.4 LBS | HEIGHT: 68 IN | HEART RATE: 81 BPM | TEMPERATURE: 98.4 F

## 2021-11-03 DIAGNOSIS — C02.3 CARCINOMA OF ANTERIOR TWO-THIRDS OF TONGUE (HCC): Primary | ICD-10-CM

## 2021-11-03 PROCEDURE — 99024 POSTOP FOLLOW-UP VISIT: CPT | Performed by: OTOLARYNGOLOGY

## 2021-11-03 NOTE — PROGRESS NOTES
Squamous cell carcinoma of the left lateral border of the tongue excised in the operating room 8 months ago. Pathology showed that the squamous cell was completely excised but there was some atypia present at the margins. This area became more prominent and 6 weeks ago had a further excision. Pathology just showed squamous hyperplasia. Lateral border of the tongue is well-healed. There is no induration and no mucosal abnormalities. Follow-up: 3 months.

## 2022-02-08 ENCOUNTER — OFFICE VISIT (OUTPATIENT)
Dept: ENT CLINIC | Age: 72
End: 2022-02-08
Payer: COMMERCIAL

## 2022-02-08 VITALS
DIASTOLIC BLOOD PRESSURE: 92 MMHG | TEMPERATURE: 97.1 F | HEIGHT: 68 IN | WEIGHT: 212.2 LBS | SYSTOLIC BLOOD PRESSURE: 147 MMHG | BODY MASS INDEX: 32.16 KG/M2 | HEART RATE: 97 BPM

## 2022-02-08 DIAGNOSIS — C02.3 CARCINOMA OF ANTERIOR TWO-THIRDS OF TONGUE (HCC): Primary | ICD-10-CM

## 2022-02-08 PROCEDURE — 99212 OFFICE O/P EST SF 10 MIN: CPT | Performed by: OTOLARYNGOLOGY

## 2022-02-09 ENCOUNTER — OFFICE VISIT (OUTPATIENT)
Dept: INTERNAL MEDICINE CLINIC | Age: 72
End: 2022-02-09
Payer: COMMERCIAL

## 2022-02-09 VITALS
DIASTOLIC BLOOD PRESSURE: 88 MMHG | WEIGHT: 210.6 LBS | HEIGHT: 68 IN | TEMPERATURE: 97.9 F | SYSTOLIC BLOOD PRESSURE: 130 MMHG | HEART RATE: 83 BPM | OXYGEN SATURATION: 99 % | BODY MASS INDEX: 31.92 KG/M2

## 2022-02-09 DIAGNOSIS — R97.20 ELEVATED PSA: ICD-10-CM

## 2022-02-09 DIAGNOSIS — M79.10 MYALGIA: Primary | ICD-10-CM

## 2022-02-09 LAB — PROSTATE SPECIFIC ANTIGEN: 3.19 NG/ML (ref 0–4)

## 2022-02-09 PROCEDURE — 99213 OFFICE O/P EST LOW 20 MIN: CPT | Performed by: INTERNAL MEDICINE

## 2022-02-09 ASSESSMENT — PATIENT HEALTH QUESTIONNAIRE - PHQ9
SUM OF ALL RESPONSES TO PHQ QUESTIONS 1-9: 0
1. LITTLE INTEREST OR PLEASURE IN DOING THINGS: 0
SUM OF ALL RESPONSES TO PHQ QUESTIONS 1-9: 0
SUM OF ALL RESPONSES TO PHQ QUESTIONS 1-9: 0
2. FEELING DOWN, DEPRESSED OR HOPELESS: 0
SUM OF ALL RESPONSES TO PHQ QUESTIONS 1-9: 0
SUM OF ALL RESPONSES TO PHQ9 QUESTIONS 1 & 2: 0

## 2022-02-09 ASSESSMENT — ENCOUNTER SYMPTOMS
SHORTNESS OF BREATH: 0
NAUSEA: 0
ABDOMINAL PAIN: 0
CHEST TIGHTNESS: 0
EYE REDNESS: 0
BACK PAIN: 0

## 2022-02-09 NOTE — PROGRESS NOTES
Subjective:      Patient ID: Roseann Orozco is a 66 y.o. male    Chief Complaint   Patient presents with    Shoulder Pain    Arm Pain       HPI     Right arm pains in the area of the forearm extensor tendons, mild chronic right tennis elbow. Similar mild pains up the right bicep to the shoulder. Mild bicep tendinitis suspected. He has some similar pains on the left side but not as intense. The pains were worse over the last few weeks. The patient denies any increased strain on his muscles. Patient also complains of bilateral foot pain. He denies any stiffness in the hands, fingers, wrists, elbow or shoulders. He does have a clicking sound in his right shoulder. Elevated PSA  Patient denies any symptoms of increased urine difficulty or frequency. The patient does get up usually once at night to urinate. Patient denies any dysuria, fever, or hematuria. Recheck PSA. Current Outpatient Medications on File Prior to Visit   Medication Sig Dispense Refill    triamcinolone (KENALOG) 0.1 % ointment APPLY 1 APPLICATION ON THE SKIN TWICE A DAY FOR TWO WEEKS THEN TWICE WEEKLY      levothyroxine (SYNTHROID) 50 MCG tablet TAKE 1 TABLET BY MOUTH EVERY DAY AT NIGHT 90 tablet 3    vitamin D 1000 UNITS CAPS Take 2,000 Units by mouth nightly        No current facility-administered medications on file prior to visit. Allergies   Allergen Reactions    Pcn [Penicillins] Other (See Comments)     Childhood BLACK TONGUE        Review of Systems   Constitutional: Negative for fatigue, fever and unexpected weight change. HENT: Negative for hearing loss. Eyes: Negative for redness and visual disturbance. Respiratory: Negative for chest tightness and shortness of breath. Cardiovascular: Negative for chest pain and palpitations. Gastrointestinal: Negative for abdominal pain and nausea. Endocrine: Negative for cold intolerance, heat intolerance, polydipsia and polyuria.    Genitourinary: Negative for dysuria and hematuria. Musculoskeletal: Positive for arthralgias and myalgias. Negative for back pain and neck pain. Skin: Negative for rash and wound. Neurological: Negative for dizziness and headaches. Hematological: Negative for adenopathy. Does not bruise/bleed easily. Psychiatric/Behavioral: Negative for agitation. The patient is not nervous/anxious. Objective:   Physical Exam  Constitutional:       Appearance: He is obese. Eyes:      Extraocular Movements: Extraocular movements intact. Cardiovascular:      Rate and Rhythm: Normal rate and regular rhythm. Musculoskeletal:      Comments: Right forearm tendon tenderness to palpation. Neurological:      Mental Status: He is alert and oriented to person, place, and time. Psychiatric:      Comments: Anxious         Assessment and plan       1. Myalgia  Mild tendinitis at the right forearm, and right bicep. Reassurance given. I suggested the patient try to engage in mild exercise activity regularly. He has persistent concerns and says these pains have been present for the last 10 years. I suggested he may want to see a specialist if he thinks he has a arthritic or inflammatory disorder.  - Sherlyn Parks MD, Rheumatology, [facility]    2. Elevated PSA  Follow-up on elevated PSA.  - PSA, Prostatic Specific Antigen;  Future

## 2022-05-09 ENCOUNTER — OFFICE VISIT (OUTPATIENT)
Dept: ENT CLINIC | Age: 72
End: 2022-05-09
Payer: COMMERCIAL

## 2022-05-09 VITALS
SYSTOLIC BLOOD PRESSURE: 135 MMHG | DIASTOLIC BLOOD PRESSURE: 97 MMHG | WEIGHT: 210 LBS | HEART RATE: 104 BPM | BODY MASS INDEX: 31.83 KG/M2 | HEIGHT: 68 IN

## 2022-05-09 DIAGNOSIS — C02.3 CARCINOMA OF ANTERIOR TWO-THIRDS OF TONGUE (HCC): Primary | ICD-10-CM

## 2022-05-09 DIAGNOSIS — Z12.81: ICD-10-CM

## 2022-05-09 PROCEDURE — 99212 OFFICE O/P EST SF 10 MIN: CPT | Performed by: OTOLARYNGOLOGY

## 2022-05-09 NOTE — PROGRESS NOTES
FOLLOW UP VISIT:    CHIEF COMPLAINT: Surveillance for carcinoma of the tongue    INTERIM HISTORY: 14 months following excision squamous cell carcinoma of the left anterior tongue. Had some leukoplakia anterior to the original excision site which was biopsied 8 months ago and was benign. He has no tongue pain. He has no limitation of tongue mobility. He does not smoke.       PAST MEDICAL HISTORY:   Social History     Tobacco Use   Smoking Status Never Smoker   Smokeless Tobacco Never Used                                                    Social History     Substance and Sexual Activity   Alcohol Use No                                                    Current Outpatient Medications:     triamcinolone (KENALOG) 0.1 % ointment, APPLY 1 APPLICATION ON THE SKIN TWICE A DAY FOR TWO WEEKS THEN TWICE WEEKLY, Disp: , Rfl:     levothyroxine (SYNTHROID) 50 MCG tablet, TAKE 1 TABLET BY MOUTH EVERY DAY AT NIGHT, Disp: 90 tablet, Rfl: 3    vitamin D 1000 UNITS CAPS, Take 2,000 Units by mouth nightly , Disp: , Rfl:                                                  Past Medical History:   Diagnosis Date    Cancer ([facility] [address]     skin cancer    Hyperlipidemia     Kidney stone 05/13/2020    Scalp lesion 2017    squamous cell Ca    Sinus congestion     Thyroid disease                                                     Past Surgical History:   Procedure Laterality Date    COLONOSCOPY  04/04/2005    COLONOSCOPY  01/05/2018    small polyps 5-10 years adenomatousvs hyperplastic    CYSTOSCOPY Right 05/14/2020    CYSTOSCOPY, RIGHT RETROGRADE PYLOGRAM, RIGHT URETEROSCOPY, LASER, STONE MANIPULATION AND EXTRACTION AND RIGHT STENT INSERTION performed by Raji De La Cruz MD at [address] [place] [address] Av GLOSSECTOMY Left 9/30/2021    PARTIAL RESECTION OF LEFT ORAL TONGUE performed by Allie Rehman MD at [facility]  2016    Dr Peri Carranza, scalp lesion , squamous cell cancer     MOUTH SURGERY Left 3/19/2021    EXCISE LESION LEFT ORAL TONGUE performed by Chip King MD at [facility] HISTORY: Family history reviewed and except as pertinent to the interim history is not contributory. REVIEW OF SYSTEMS:  All pertinent positive and negative findings included in HPI. Otherwise, all other systems are reviewed and are negative    PHYSICAL EXAMINATION:   GENERAL: wdwn- no acute distress  RESPIRATORY: No stridor. COMMUNICATION :  Normal voice  MENTAL STATUS: Alert and oriented x3  HEAD AND FACE: No skin lesions of the face. EXTERNAL EARS AND NOSE: The external ears and nasal pyramid are normal.  FACIAL MUSCLES: All branches of the facial nerve intact. EXTRAOCULAR MUSCLES: Intact with full range of motion. LIPS, TEETH, GINGIVA:  Normal mucosa  PHARYNX: Incision site of the partial glossectomy is clean. There are no mucosal lesions. There is no induration on palpation. NECK:  No masses. LYMPH NODES: No cervical lymphadenopathy. SALIVARY GLANDS: Parotid and submandibular glands normal.  THYROID: No goiter or thyroid nodules palpable. IMPRESSION: No evidence of recurrence of tongue carcinoma. PLAN: Continue surveillance every 3 months. FOLLOW-UP: 3 months.

## 2022-07-05 RX ORDER — LEVOTHYROXINE SODIUM 0.05 MG/1
TABLET ORAL
Qty: 90 TABLET | Refills: 1 | Status: SHIPPED | OUTPATIENT
Start: 2022-07-05

## 2022-08-29 ENCOUNTER — OFFICE VISIT (OUTPATIENT)
Dept: ENT CLINIC | Age: 72
End: 2022-08-29
Payer: COMMERCIAL

## 2022-08-29 VITALS
BODY MASS INDEX: 31.25 KG/M2 | WEIGHT: 206.2 LBS | TEMPERATURE: 97.3 F | SYSTOLIC BLOOD PRESSURE: 130 MMHG | HEIGHT: 68 IN | DIASTOLIC BLOOD PRESSURE: 75 MMHG | HEART RATE: 74 BPM

## 2022-08-29 DIAGNOSIS — C02.3 CARCINOMA OF ANTERIOR TWO-THIRDS OF TONGUE (HCC): Primary | ICD-10-CM

## 2022-08-29 DIAGNOSIS — Z12.81: ICD-10-CM

## 2022-08-29 PROCEDURE — 99213 OFFICE O/P EST LOW 20 MIN: CPT | Performed by: OTOLARYNGOLOGY

## 2022-08-29 PROCEDURE — 1123F ACP DISCUSS/DSCN MKR DOCD: CPT | Performed by: OTOLARYNGOLOGY

## 2022-08-29 NOTE — PROGRESS NOTES
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 He does not smoke. No tongue pain. No limitation to tongue movement.       PAST MEDICAL HISTORY:   Social History     Tobacco Use   Smoking Status Never   Smokeless Tobacco Never                                                    Social History     Substance and Sexual Activity   Alcohol Use No                                                    Current Outpatient Medications:     levothyroxine (SYNTHROID) 50 MCG tablet, TAKE 1 TABLET BY MOUTH EVERY DAY AT NIGHT, Disp: 90 tablet, Rfl: 1    vitamin D 1000 UNITS CAPS, Take 2,000 Units by mouth nightly , Disp: , Rfl:                                                  Past Medical History:   Diagnosis Date    Cancer ([facility] [address]     skin cancer    Hyperlipidemia     Kidney stone 05/13/2020    Scalp lesion 2017    squamous cell Ca    Sinus congestion     Thyroid disease                                                     Past Surgical History:   Procedure Laterality Date    COLONOSCOPY  04/04/2005    COLONOSCOPY  01/05/2018    small polyps 5-10 years adenomatousvs hyperplastic    CYSTOSCOPY Right 05/14/2020    CYSTOSCOPY, RIGHT RETROGRADE PYLOGRAM, RIGHT URETEROSCOPY, LASER, STONE MANIPULATION AND EXTRACTION AND RIGHT STENT INSERTION performed by Jer Cazares MD at [address]    GLOSSECTOMY Left 9/30/2021    PARTIAL RESECTION OF LEFT ORAL TONGUE performed by Jennifer Somers MD at [address] [facility]  2016    Dr Louis Montes, scalp lesion , squamous cell cancer     MOUTH SURGERY Left 3/19/2021    EXCISE LESION LEFT ORAL TONGUE performed by Jennifer Somers MD at [address]: Family history reviewed and except as pertinent to the interim history is not contributory. REVIEW OF SYSTEMS:  All pertinent positive and negative findings included in HPI. Otherwise, all other systems are reviewed and are negative    PHYSICAL EXAMINATION:   GENERAL: wdwn- no acute distress  RESPIRATORY: No stridor. COMMUNICATION :  Normal voice  MENTAL STATUS: Alert and oriented x3  HEAD AND FACE: No skin lesions of the face. EXTERNAL EARS AND NOSE: The external ears and nasal pyramid are normal.  FACIAL MUSCLES: All branches of the facial nerve intact. EXTRAOCULAR MUSCLES: Intact with full range of motion. LIPS, TEETH, GINGIVA:  Normal mucosa  PHARYNX: The left lateral border of the tongue, where the carcinoma was excised, is well-healed and is soft to palpation. Tongue mobility is good. NECK:  No masses. LYMPH NODES: No cervical lymphadenopathy. SALIVARY GLANDS: Parotid and submandibular glands normal.  THYROID: No goiter or thyroid nodules palpable. IMPRESSION: No recurrence of tongue carcinoma. PLAN: Patient reassured. FOLLOW-UP: 3 months.

## 2022-09-15 ENCOUNTER — OFFICE VISIT (OUTPATIENT)
Dept: INTERNAL MEDICINE CLINIC | Age: 72
End: 2022-09-15

## 2022-09-15 ENCOUNTER — HOSPITAL ENCOUNTER (EMERGENCY)
Age: 72
Discharge: HOME | End: 2022-09-15
Attending: EMERGENCY MEDICINE
Payer: COMMERCIAL

## 2022-09-15 ENCOUNTER — APPOINTMENT (OUTPATIENT)
Dept: CT IMAGING | Age: 72
End: 2022-09-15
Payer: COMMERCIAL

## 2022-09-15 VITALS
OXYGEN SATURATION: 94 % | SYSTOLIC BLOOD PRESSURE: 139 MMHG | WEIGHT: 205 LBS | BODY MASS INDEX: 31.07 KG/M2 | HEART RATE: 71 BPM | TEMPERATURE: 98.6 F | HEIGHT: 68 IN | RESPIRATION RATE: 16 BRPM | DIASTOLIC BLOOD PRESSURE: 73 MMHG

## 2022-09-15 VITALS
WEIGHT: 204 LBS | DIASTOLIC BLOOD PRESSURE: 94 MMHG | BODY MASS INDEX: 31.02 KG/M2 | SYSTOLIC BLOOD PRESSURE: 160 MMHG | TEMPERATURE: 97.2 F

## 2022-09-15 DIAGNOSIS — N20.0 KIDNEY STONE: Primary | ICD-10-CM

## 2022-09-15 DIAGNOSIS — R10.9 FLANK PAIN: Primary | ICD-10-CM

## 2022-09-15 LAB
ALBUMIN SERPL-MCNC: 4.9 G/DL (ref 3.4–5)
ALP BLD-CCNC: 93 U/L (ref 40–129)
ALT SERPL-CCNC: 24 U/L (ref 10–40)
ANION GAP SERPL CALCULATED.3IONS-SCNC: 14 MMOL/L (ref 3–16)
AST SERPL-CCNC: 23 U/L (ref 15–37)
BACTERIA: ABNORMAL /HPF
BASOPHILS ABSOLUTE: 0 K/UL (ref 0–0.2)
BASOPHILS RELATIVE PERCENT: 0.6 %
BILIRUB SERPL-MCNC: 0.6 MG/DL (ref 0–1)
BILIRUBIN DIRECT: <0.2 MG/DL (ref 0–0.3)
BILIRUBIN URINE: NEGATIVE
BILIRUBIN, INDIRECT: NORMAL MG/DL (ref 0–1)
BLOOD, URINE: ABNORMAL
BUN BLDV-MCNC: 22 MG/DL (ref 7–20)
CALCIUM SERPL-MCNC: 9 MG/DL (ref 8.3–10.6)
CHLORIDE BLD-SCNC: 104 MMOL/L (ref 99–110)
CLARITY: CLEAR
CO2: 21 MMOL/L (ref 21–32)
COLOR: YELLOW
CREAT SERPL-MCNC: 1 MG/DL (ref 0.8–1.3)
EOSINOPHILS ABSOLUTE: 0 K/UL (ref 0–0.6)
EOSINOPHILS RELATIVE PERCENT: 0.2 %
EPITHELIAL CELLS, UA: ABNORMAL /HPF (ref 0–5)
GFR AFRICAN AMERICAN: >60
GFR NON-AFRICAN AMERICAN: >60
GLUCOSE BLD-MCNC: 137 MG/DL (ref 70–99)
GLUCOSE URINE: NEGATIVE MG/DL
HCT VFR BLD CALC: 43.6 % (ref 40.5–52.5)
HEMOGLOBIN: 14.7 G/DL (ref 13.5–17.5)
KETONES, URINE: ABNORMAL MG/DL
LEUKOCYTE ESTERASE, URINE: NEGATIVE
LIPASE: 218 U/L (ref 13–60)
LYMPHOCYTES ABSOLUTE: 1.3 K/UL (ref 1–5.1)
LYMPHOCYTES RELATIVE PERCENT: 21.5 %
MCH RBC QN AUTO: 33 PG (ref 26–34)
MCHC RBC AUTO-ENTMCNC: 33.7 G/DL (ref 31–36)
MCV RBC AUTO: 98.1 FL (ref 80–100)
MICROSCOPIC EXAMINATION: YES
MONOCYTES ABSOLUTE: 0.5 K/UL (ref 0–1.3)
MONOCYTES RELATIVE PERCENT: 7.7 %
MUCUS: ABNORMAL /LPF
NEUTROPHILS ABSOLUTE: 4.4 K/UL (ref 1.7–7.7)
NEUTROPHILS RELATIVE PERCENT: 70 %
NITRITE, URINE: NEGATIVE
PDW BLD-RTO: 14.3 % (ref 12.4–15.4)
PH UA: 6 (ref 5–8)
PLATELET # BLD: 156 K/UL (ref 135–450)
PMV BLD AUTO: 8.1 FL (ref 5–10.5)
POTASSIUM REFLEX MAGNESIUM: 4.2 MMOL/L (ref 3.5–5.1)
PROTEIN UA: NEGATIVE MG/DL
RBC # BLD: 4.44 M/UL (ref 4.2–5.9)
RBC UA: ABNORMAL /HPF (ref 0–4)
SODIUM BLD-SCNC: 139 MMOL/L (ref 136–145)
SPECIFIC GRAVITY UA: 1.02 (ref 1–1.03)
TOTAL PROTEIN: 7.2 G/DL (ref 6.4–8.2)
URINE REFLEX TO CULTURE: ABNORMAL
URINE TYPE: ABNORMAL
UROBILINOGEN, URINE: 0.2 E.U./DL
WBC # BLD: 6.3 K/UL (ref 4–11)
WBC UA: ABNORMAL /HPF (ref 0–5)

## 2022-09-15 PROCEDURE — 99284 EMERGENCY DEPT VISIT MOD MDM: CPT

## 2022-09-15 PROCEDURE — 83690 ASSAY OF LIPASE: CPT

## 2022-09-15 PROCEDURE — 6360000002 HC RX W HCPCS: Performed by: PHYSICIAN ASSISTANT

## 2022-09-15 PROCEDURE — 85025 COMPLETE CBC W/AUTO DIFF WBC: CPT

## 2022-09-15 PROCEDURE — 80076 HEPATIC FUNCTION PANEL: CPT

## 2022-09-15 PROCEDURE — 81001 URINALYSIS AUTO W/SCOPE: CPT

## 2022-09-15 PROCEDURE — 74176 CT ABD & PELVIS W/O CONTRAST: CPT

## 2022-09-15 PROCEDURE — 2580000003 HC RX 258: Performed by: PHYSICIAN ASSISTANT

## 2022-09-15 PROCEDURE — 96374 THER/PROPH/DIAG INJ IV PUSH: CPT

## 2022-09-15 PROCEDURE — 6370000000 HC RX 637 (ALT 250 FOR IP): Performed by: PHYSICIAN ASSISTANT

## 2022-09-15 PROCEDURE — 99999 PR OFFICE/OUTPT VISIT,PROCEDURE ONLY: CPT | Performed by: STUDENT IN AN ORGANIZED HEALTH CARE EDUCATION/TRAINING PROGRAM

## 2022-09-15 PROCEDURE — 80048 BASIC METABOLIC PNL TOTAL CA: CPT

## 2022-09-15 RX ORDER — TAMSULOSIN HYDROCHLORIDE 0.4 MG/1
0.4 CAPSULE ORAL ONCE
Status: COMPLETED | OUTPATIENT
Start: 2022-09-15 | End: 2022-09-15

## 2022-09-15 RX ORDER — 0.9 % SODIUM CHLORIDE 0.9 %
1000 INTRAVENOUS SOLUTION INTRAVENOUS ONCE
Status: COMPLETED | OUTPATIENT
Start: 2022-09-15 | End: 2022-09-15

## 2022-09-15 RX ORDER — OXYCODONE HYDROCHLORIDE 5 MG/1
5 TABLET ORAL ONCE
Status: COMPLETED | OUTPATIENT
Start: 2022-09-15 | End: 2022-09-15

## 2022-09-15 RX ORDER — MORPHINE SULFATE 4 MG/ML
4 INJECTION, SOLUTION INTRAMUSCULAR; INTRAVENOUS ONCE
Status: COMPLETED | OUTPATIENT
Start: 2022-09-15 | End: 2022-09-15

## 2022-09-15 RX ORDER — OXYCODONE HYDROCHLORIDE 5 MG/1
5 TABLET ORAL EVERY 6 HOURS PRN
Qty: 10 TABLET | Refills: 0 | Status: SHIPPED | OUTPATIENT
Start: 2022-09-15 | End: 2022-09-18

## 2022-09-15 RX ORDER — TAMSULOSIN HYDROCHLORIDE 0.4 MG/1
0.4 CAPSULE ORAL DAILY
Qty: 5 CAPSULE | Refills: 0 | Status: SHIPPED | OUTPATIENT
Start: 2022-09-15 | End: 2022-09-20

## 2022-09-15 RX ADMIN — OXYCODONE 5 MG: 5 TABLET ORAL at 20:09

## 2022-09-15 RX ADMIN — SODIUM CHLORIDE 1000 ML: 9 INJECTION, SOLUTION INTRAVENOUS at 13:30

## 2022-09-15 RX ADMIN — TAMSULOSIN HYDROCHLORIDE 0.4 MG: 0.4 CAPSULE ORAL at 20:09

## 2022-09-15 RX ADMIN — MORPHINE SULFATE 4 MG: 4 INJECTION INTRAVENOUS at 13:30

## 2022-09-15 ASSESSMENT — PAIN DESCRIPTION - FREQUENCY
FREQUENCY: CONTINUOUS

## 2022-09-15 ASSESSMENT — ENCOUNTER SYMPTOMS
VOMITING: 0
NAUSEA: 0
DIARRHEA: 0
ABDOMINAL PAIN: 0
COUGH: 0
SHORTNESS OF BREATH: 0
CHEST TIGHTNESS: 0

## 2022-09-15 ASSESSMENT — PAIN DESCRIPTION - LOCATION
LOCATION: FLANK

## 2022-09-15 ASSESSMENT — PAIN DESCRIPTION - DESCRIPTORS
DESCRIPTORS: ACHING

## 2022-09-15 ASSESSMENT — LIFESTYLE VARIABLES: HOW OFTEN DO YOU HAVE A DRINK CONTAINING ALCOHOL: NEVER

## 2022-09-15 ASSESSMENT — PAIN SCALES - GENERAL
PAINLEVEL_OUTOF10: 5
PAINLEVEL_OUTOF10: 3

## 2022-09-15 ASSESSMENT — PAIN DESCRIPTION - ORIENTATION
ORIENTATION: RIGHT

## 2022-09-15 ASSESSMENT — PAIN - FUNCTIONAL ASSESSMENT
PAIN_FUNCTIONAL_ASSESSMENT: ACTIVITIES ARE NOT PREVENTED
PAIN_FUNCTIONAL_ASSESSMENT: 0-10
PAIN_FUNCTIONAL_ASSESSMENT: ACTIVITIES ARE NOT PREVENTED
PAIN_FUNCTIONAL_ASSESSMENT: 0-10

## 2022-09-15 ASSESSMENT — PAIN DESCRIPTION - PAIN TYPE
TYPE: ACUTE PAIN

## 2022-09-15 NOTE — ED PROVIDER NOTES
[address] [facility] ENCOUNTER          PHYSICIAN ASSISTANT NOTE     Date of evaluation: 9/15/2022    ADDENDUM:      [facility] The patient was seen for Flank Pain (Flank pain starting this morning)  . The patient's initial evaluation and plan have been discussed with the prior provider who initially evaluated the patient. Nursing Notes, Past Medical Hx, Past Surgical Hx, Social Hx, Allergies, and Family Hx were all reviewed. Diagnostic Results     RADIOLOGY:  CT ABDOMEN PELVIS WO CONTRAST Additional Contrast? None   Final Result   1.  6 mm stone at the left UPJ with associated mild left hydronephrosis. 2.  Punctate nonobstructive bilateral nephrolithiasis. 3.  Subcentimeter pulmonary nodules, unchanged from prior study.            LABS:   Results for orders placed or performed during the hospital encounter of 09/15/22   BMP w/ Reflex to MG   Result Value Ref Range    Sodium 139 136 - 145 mmol/L    Potassium reflex Magnesium 4.2 3.5 - 5.1 mmol/L    Chloride 104 99 - 110 mmol/L    CO2 21 21 - 32 mmol/L    Anion Gap 14 3 - 16    Glucose 137 (H) 70 - 99 mg/dL    BUN 22 (H) 7 - 20 mg/dL    Creatinine 1.0 0.8 - 1.3 mg/dL    GFR Non-African American >60 >60    GFR African American >60 >60    Calcium 9.0 8.3 - 10.6 mg/dL   CBC with Auto Differential   Result Value Ref Range    WBC 6.3 4.0 - 11.0 K/uL    RBC 4.44 4.20 - 5.90 M/uL    Hemoglobin 14.7 13.5 - 17.5 g/dL    Hematocrit 43.6 40.5 - 52.5 %    MCV 98.1 80.0 - 100.0 fL    MCH 33.0 26.0 - 34.0 pg    MCHC 33.7 31.0 - 36.0 g/dL    RDW 14.3 12.4 - 15.4 %    Platelets 443 073 - 582 K/uL    MPV 8.1 5.0 - 10.5 fL    Neutrophils % 70.0 %    Lymphocytes % 21.5 %    Monocytes % 7.7 %    Eosinophils % 0.2 %    Basophils % 0.6 %    Neutrophils Absolute 4.4 1.7 - 7.7 K/uL    Lymphocytes Absolute 1.3 1.0 - 5.1 K/uL    Monocytes Absolute 0.5 0.0 - 1.3 K/uL    Eosinophils Absolute 0.0 0.0 - 0.6 K/uL Basophils Absolute 0.0 0.0 - 0.2 K/uL   Lipase   Result Value Ref Range    Lipase 218.0 (H) 13.0 - 60.0 U/L   Hepatic Function Panel   Result Value Ref Range    Total Protein 7.2 6.4 - 8.2 g/dL    Albumin 4.9 3.4 - 5.0 g/dL    Alkaline Phosphatase 93 40 - 129 U/L    ALT 24 10 - 40 U/L    AST 23 15 - 37 U/L    Total Bilirubin 0.6 0.0 - 1.0 mg/dL    Bilirubin, Direct <0.2 0.0 - 0.3 mg/dL    Bilirubin, Indirect see below 0.0 - 1.0 mg/dL   Urinalysis with Reflex to Culture    Specimen: Urine   Result Value Ref Range    Color, UA Yellow Straw/Yellow    Clarity, UA Clear Clear    Glucose, Ur Negative Negative mg/dL    Bilirubin Urine Negative Negative    Ketones, Urine TRACE (A) Negative mg/dL    Specific Gravity, UA 1.025 1.005 - 1.030    Blood, Urine TRACE-INTACT (A) Negative    pH, UA 6.0 5.0 - 8.0    Protein, UA Negative Negative mg/dL    Urobilinogen, Urine 0.2 <2.0 E.U./dL    Nitrite, Urine Negative Negative    Leukocyte Esterase, Urine Negative Negative    Microscopic Examination YES     Urine Type Voided     Urine Reflex to Culture Not Indicated    Microscopic Urinalysis   Result Value Ref Range    Mucus, UA Rare (A) None Seen /LPF    WBC, UA 0-2 0 - 5 /HPF    RBC, UA 0-2 0 - 4 /HPF    Epithelial Cells, UA 0-1 0 - 5 /HPF    Bacteria, UA Rare (A) None Seen /HPF     RECENT VITALS:  BP: 139/73, Temp: 98.6 °F (37 °C), Heart Rate: 71, Resp: 16, SpO2: 94 %     Procedures     N/a    ED Course          The patient was given the following medications:  Orders Placed This Encounter   Medications    0.9 % sodium chloride bolus    morphine injection 4 mg    oxyCODONE (ROXICODONE) 5 MG immediate release tablet     Sig: Take 1 tablet by mouth every 6 hours as needed for Pain for up to 3 days.      Dispense:  10 tablet     Refill:  0    tamsulosin (FLOMAX) 0.4 MG capsule     Sig: Take 1 capsule by mouth daily for 5 doses     Dispense:  5 capsule     Refill:  0    oxyCODONE (ROXICODONE) immediate release tablet 5 mg tamsulosin (FLOMAX) capsule 0.4 mg       CONSULTS:  IP CONSULT TO [facility] / ASSESSMENT / Marleny Puneet is a 67 y.o. male with history of kidney stone, most recent with a 5 mm that required ureteral stent placement. Coming in today with right flank pain that started earlier this morning. He denies any associated symptoms. Feels like a previous kidney stone. CT scan showed a 6 mm stone at the UPJ, noncontrast CT scan. Urine pending. PENDING: urinalysis   AFTER TURNOVER: Patient's white blood cell count normal at 6.3. Low suspicion for infected kidney stone in the absence of any nitrates, leukocytes or white blood cells on urinalysis. I did speak with urology given the size of the patient's stone who was comfortable with outpatient management. Patient is also comfortable with this plan and attempt at outpatient passage. He will be given a final dose of oxycodone prior to discharge, sent home with prescriptions for oxycodone and Flomax. We also discussed return precautions and close urology follow-up. This patient was also evaluated by the attending physician. All care plans were discussed and agreed upon. Clinical Impression     1. Kidney stone      Disposition     PATIENT REFERRED TO:  Elif Davis MD  [address] [place] [address]  [phone]    Schedule an appointment as soon as possible for a visit       Padmini Webb MD  [place] [id number]  [facility] [address] [place] [address]  [phone]    Schedule an appointment as soon as possible for a visit   Urology    DISCHARGE MEDICATIONS:  Discharge Medication List as of 9/15/2022  8:10 PM        START taking these medications    Details   oxyCODONE (ROXICODONE) 5 MG immediate release tablet Take 1 tablet by mouth every 6 hours as needed for Pain for up to 3 days. , Disp-10 tablet, R-0Normal      tamsulosin (FLOMAX) 0.4 MG capsule Take 1 capsule by mouth daily for 5 doses, Disp-5 capsule, R-0Normal           DISPOSITION Decision To Discharge 09/15/2022 08:18:42 PM         Kalee Gallagher  09/16/22 [id number]

## 2022-09-15 NOTE — ED PROVIDER NOTES
ED Attending Attestation Note     Date of evaluation: 9/15/2022    This patient was seen by the advance practice provider. I have seen and examined the patient, agree with the workup, evaluation, management and diagnosis. The care plan has been discussed. My assessment reveals 41-year-old male with a history of kidney stones presenting for concern of kidney stone. Patient has a history of lithotripsy on the right, and has a known stone on the left. Presented with 1 day of pain. On my examination, he has no CVA tenderness, appears comfortable and notes that his pain is improved to a 3 out of 10. He does have concerned that his kidney stone is there, and experienced pain increasing in my exam subsequently will obtain CT for evaluation of stone. CT evidence of a 6 mm stone in the left kidney. Urinalysis without infection, urology consulted.      Ania Nash MD  09/15/22 0929

## 2022-09-15 NOTE — DISCHARGE INSTRUCTIONS
ED Course     Today, you were seen in the Emergency Department. You have been diagnosed with:   1. Kidney stone      Disposition/Plan     IMPORTANT INSTRUCTIONS:  You have a kidney stone based on your CT scan. You may alternate Tylenol and Ibuprofen for pain coverage. Take Ibuprofen 600 mg every 6 hours for your pain. Ibuprofen: Take one 600 mg tablet every 6 hours as needed for pain and inflammation. Take this medicine with food, and be sure to drink plenty of water. Do not take this medicine continuously for longer than two weeks. Ibuprofen is a part of a class of medications called nonsteroidal anti-inflammatory drugs (NSAIDs). NSAIDs cause an increased risk of serious gastrointestinal (GI) adverse events, including bleeding, ulceration, and perforation of the stomach or intestines, which can be fatal. These events can occur at any time during use and without warning symptoms. Elderly patients and patients with a prior history of peptic ulcer disease and/or GI bleeding are at greater risk for serious GI events. Nonsteroidal anti-inflammatory drugs (NSAIDs) cause an increased risk of serious cardiovascular events, including heart attack and stroke, which can be fatal. This risk may occur early in treatment and may increase with duration of use. Use with caution. 3 hours after taking the Ibuprofen, alternate with Tylenol 500 mg. Tylenol 500mg   Take 1 tablet by mouth every 8 hours as needed for pain. Tylenol is highly effective when combined with an anti-inflammatory (NSAID) drug such as: ibuprofen (Advil, Aleve, Motrin, Naproxen). If you were prescribed both medications, take them together. They work better when taken together vs one or the other. Tylenol is in other pain medications, such as Vicodin and Percocet. Do not take more than 3,000mg (6 tablets) of Tylenol in one day. Do not consume more than 3 alcoholic beverages while taking Tylenol.    Do not take Tylenol if you have liver disease. For severe, breakthrough pain you can take the prescribed oxycodone. Oxycodone 5mg  Take 1 tablet every 6 hours as needed for breakthrough pain. This is a narcotic medication. Do not drink alcohol, drive or operate heavy machinery while taking Oxycodone. This medication can be habit-forming. ONLY take as prescribed. If taken more than prescribed, or with other sedating medications/alcohol, this may lead to respiratory depression, coma and/or death. Follow-up with your primary care physician as soon as possible regarding your visit. Call Urology* specialty to make an appointment for follow-up. Be sure to return to the Emergency Department immediately if symptoms worsen or new symptoms occur as we discussed, such as worsening pain, vomiting that you cannot stop, passing out, fevers over 100.5 °F. PLEASE FOLLOW UP WITH:  Jonn Stanford MD  [address] [facility]    Schedule an appointment as soon as possible for a visit       MD Dimple [facility] 1966  [facility] [address] [place] [address]  [phone]    Schedule an appointment as soon as possible for a visit   Urology    DISCHARGE MEDICATIONS:  The following medications were prescribed for the you during this visit. Take them as directed below:     New Prescriptions    No medications on file     Additional important information has been included with this packet, please make sure that you have read this information as it will better help you understand you illness/injury better.

## 2022-09-15 NOTE — ED PROVIDER NOTES
[address] [facility] [address] ENCOUNTER          PHYSICIAN ASSISTANT NOTE       Date of evaluation: 9/15/2022    Chief Complaint     Flank Pain (Flank pain starting this morning)      History of Present Illness     Emma Bruce is a 67 y.o. male with a history of kidney stones, squamous cell carcinoma with excision, thyroid disease who comes to the emergency department today complaining of left flank pain ongoing since he awoke this morning which feels prior to kidney stones he had on the right side 2 years ago. The pain does not radiate to any other part of his body. He has not taken anything for pain to this point. Patient currently does not see urology. He denies fevers, cough, chest pain, shortness of breath, nausea, vomiting, pain elsewhere in his abdomen, change in bowel or bladder function including blood in his urine. Review of Systems     Review of Systems   Constitutional:  Negative for chills and fever. Respiratory:  Negative for cough, chest tightness and shortness of breath. Cardiovascular:  Negative for chest pain. Gastrointestinal:  Negative for abdominal pain, diarrhea, nausea and vomiting. Genitourinary:  Positive for flank pain. Negative for dysuria. Neurological:  Negative for dizziness, light-headedness and headaches. Past Medical, Surgical, Family, and Social History     He has a past medical history of Cancer ([facility] [address] Hyperlipidemia, Kidney stone, Scalp lesion, Sinus congestion, and Thyroid disease. He has a past surgical history that includes Colonoscopy (04/04/2005); Mohs surgery (2016); Colonoscopy (01/05/2018); Tonsillectomy; sinus surgery; Cystoscopy (Right, 05/14/2020); Mouth surgery (Left, 3/19/2021); and Glossectomy (Left, 9/30/2021). His family history includes No Known Problems in his father and mother. He reports that he has never smoked.  He has never used smokeless tobacco. He reports that he does not drink alcohol and does not use drugs.    Medications     Previous Medications    LEVOTHYROXINE (SYNTHROID) 50 MCG TABLET    TAKE 1 TABLET BY MOUTH EVERY DAY AT NIGHT    VITAMIN D 1000 UNITS CAPS    Take 2,000 Units by mouth nightly        Allergies     He is allergic to pcn [penicillins]. Physical Exam     INITIAL VITALS: BP: (!) 180/87, Temp: 98.6 °F (37 °C), Heart Rate: 73, Resp: 18, SpO2: 96 %  Physical Exam  Constitutional:       Appearance: Normal appearance. HENT:      Head: Normocephalic and atraumatic. Cardiovascular:      Rate and Rhythm: Normal rate and regular rhythm. Pulses: Normal pulses. Heart sounds: Normal heart sounds. Pulmonary:      Effort: Pulmonary effort is normal.      Breath sounds: Normal breath sounds. Abdominal:      Palpations: Abdomen is soft. Comments: No tenderness to palpation of the abdomen. No guarding or rigidity. No rebound tenderness. He was administered 1 L normal saline, morphine here in the emergency department. Musculoskeletal:         General: Normal range of motion. Cervical back: Normal range of motion. Skin:     General: Skin is warm and dry. Neurological:      Mental Status: He is alert and oriented to person, place, and time. Psychiatric:         Mood and Affect: Mood normal.         Behavior: Behavior normal.       Diagnostic Results     RADIOLOGY:  CT ABDOMEN PELVIS WO CONTRAST Additional Contrast? None   Final Result   1.  6 mm stone at the left UPJ with associated mild left hydronephrosis. 2.  Punctate nonobstructive bilateral nephrolithiasis. 3.  Subcentimeter pulmonary nodules, unchanged from prior study.              LABS:   Results for orders placed or performed during the hospital encounter of 09/15/22   BMP w/ Reflex to MG   Result Value Ref Range    Sodium 139 136 - 145 mmol/L    Potassium reflex Magnesium 4.2 3.5 - 5.1 mmol/L    Chloride 104 99 - 110 mmol/L    CO2 21 21 - 32 mmol/L    Anion Gap 14 3 - 16    Glucose 137 (H) 70 - 99 mg/dL    BUN 22 (H) 7 - 20 mg/dL    Creatinine 1.0 0.8 - 1.3 mg/dL    GFR Non-African American >60 >60    GFR African American >60 >60    Calcium 9.0 8.3 - 10.6 mg/dL   CBC with Auto Differential   Result Value Ref Range    WBC 6.3 4.0 - 11.0 K/uL    RBC 4.44 4.20 - 5.90 M/uL    Hemoglobin 14.7 13.5 - 17.5 g/dL    Hematocrit 43.6 40.5 - 52.5 %    MCV 98.1 80.0 - 100.0 fL    MCH 33.0 26.0 - 34.0 pg    MCHC 33.7 31.0 - 36.0 g/dL    RDW 14.3 12.4 - 15.4 %    Platelets 636 801 - 106 K/uL    MPV 8.1 5.0 - 10.5 fL    Neutrophils % 70.0 %    Lymphocytes % 21.5 %    Monocytes % 7.7 %    Eosinophils % 0.2 %    Basophils % 0.6 %    Neutrophils Absolute 4.4 1.7 - 7.7 K/uL    Lymphocytes Absolute 1.3 1.0 - 5.1 K/uL    Monocytes Absolute 0.5 0.0 - 1.3 K/uL    Eosinophils Absolute 0.0 0.0 - 0.6 K/uL    Basophils Absolute 0.0 0.0 - 0.2 K/uL   Lipase   Result Value Ref Range    Lipase 218.0 (H) 13.0 - 60.0 U/L   Hepatic Function Panel   Result Value Ref Range    Total Protein 7.2 6.4 - 8.2 g/dL    Albumin 4.9 3.4 - 5.0 g/dL    Alkaline Phosphatase 93 40 - 129 U/L    ALT 24 10 - 40 U/L    AST 23 15 - 37 U/L    Total Bilirubin 0.6 0.0 - 1.0 mg/dL    Bilirubin, Direct <0.2 0.0 - 0.3 mg/dL    Bilirubin, Indirect see below 0.0 - 1.0 mg/dL       ED BEDSIDE ULTRASOUND:  No results found. RECENT VITALS:  BP: (!) 145/71, Temp: 98.6 °F (37 °C), Heart Rate: 71, Resp: 18, SpO2: 97 %     Procedures         ED Course     Nursing Notes, Past Medical Hx,Past Surgical Hx, Social Hx, Allergies, and Family Hx were reviewed.          The patient was given the following medications:  Orders Placed This Encounter   Medications    0.9 % sodium chloride bolus    morphine injection 4 mg       CONSULTS:  None    MEDICAL DECISION MAKING / ASSESSMENT / Jese Fritz is a 67 y.o. male with a history of kidney stones, squamous cell carcinoma with excision, thyroid disease who comes to the emergency department today complaining of left flank pain ongoing since he awoke this morning which feels prior to kidney stones he had on the right side 2 years ago. The pain does not radiate to any other part of his body. He has not taken anything for pain to this point. Patient currently does not see urology. He denies fevers, cough, chest pain, shortness of breath, nausea, vomiting, pain elsewhere in his abdomen, change in bowel or bladder function including blood in his urine. He is alert and oriented x4. Vital signs are stable. On exam there is mild CVA tenderness on the left. CVA tenderness is absent on the right. No tenderness to palpation of the abdomen. No guarding or rigidity. No rebound tenderness. He was administered 1 L normal saline, morphine here in the emergency department. Lipase 218; hepatic panel, CBC, BMP are unremarkable; UA was pending at the time of 1 of service            CT abdomen and pelvis without IV contrast to evaluate for kidney stone:  CT ABDOMEN PELVIS WO CONTRAST Additional Contrast? None   Final Result   1.  6 mm stone at the left UPJ with associated mild left hydronephrosis. 2.  Punctate nonobstructive bilateral nephrolithiasis. 3.  Subcentimeter pulmonary nodules, unchanged from prior study. At this time I am going off service will be turning over care of this patient to my colleague Florencia Rosas PA-C. Steps remaining in care of this patient at the time I went off service are:    1.  UA  2. Urology consult  3. Disposition    This patient was also evaluated by the attending physician. All care plans were discussed and agreed upon. Clinical Impression     1. Kidney stone        Disposition     PATIENT REFERRED TO:  No follow-up provider specified.     DISCHARGE MEDICATIONS:  New Prescriptions    No medications on file       [facility]  09/15/22 6287 [address] PA-  09/15/22 0706

## 2022-10-28 ENCOUNTER — TELEPHONE (OUTPATIENT)
Dept: INTERNAL MEDICINE CLINIC | Age: 72
End: 2022-10-28

## 2022-10-28 NOTE — TELEPHONE ENCOUNTER
The patient called wanting a sooner appointment. There aren't any sooner appointments than 11/30/22. The patient has been notified.

## 2022-10-28 NOTE — TELEPHONE ENCOUNTER
----- Message from Jonathan Urban sent at 10/28/2022 10:03 AM EDT -----  Subject: Message to Provider    QUESTIONS  Information for Provider? Pt scheduled his annual physical with PCP Benita   for 11/30/22 at 2:30pm and would like to be called if a sooner appt   becomes available. Pt only wants to see PCP Spore and flexible with   scheduling times. ---------------------------------------------------------------------------  --------------  Jonathan DAHL  [phone]; OK to leave message on voicemail  ---------------------------------------------------------------------------  --------------  SCRIPT ANSWERS  Relationship to Patient?  Self

## 2022-11-30 ENCOUNTER — OFFICE VISIT (OUTPATIENT)
Dept: INTERNAL MEDICINE CLINIC | Age: 72
End: 2022-11-30
Payer: COMMERCIAL

## 2022-11-30 VITALS
TEMPERATURE: 98.6 F | OXYGEN SATURATION: 100 % | BODY MASS INDEX: 31.13 KG/M2 | SYSTOLIC BLOOD PRESSURE: 130 MMHG | HEART RATE: 73 BPM | HEIGHT: 68 IN | DIASTOLIC BLOOD PRESSURE: 80 MMHG | WEIGHT: 205.4 LBS

## 2022-11-30 DIAGNOSIS — E03.4 HYPOTHYROIDISM DUE TO ACQUIRED ATROPHY OF THYROID: ICD-10-CM

## 2022-11-30 DIAGNOSIS — E78.2 MIXED HYPERLIPIDEMIA: ICD-10-CM

## 2022-11-30 DIAGNOSIS — R73.9 HYPERGLYCEMIA: ICD-10-CM

## 2022-11-30 DIAGNOSIS — G89.29 HIP PAIN, CHRONIC, RIGHT: ICD-10-CM

## 2022-11-30 DIAGNOSIS — Z12.5 SCREENING PSA (PROSTATE SPECIFIC ANTIGEN): ICD-10-CM

## 2022-11-30 DIAGNOSIS — M25.551 HIP PAIN, CHRONIC, RIGHT: ICD-10-CM

## 2022-11-30 DIAGNOSIS — Z00.00 MEDICARE ANNUAL WELLNESS VISIT, SUBSEQUENT: Primary | ICD-10-CM

## 2022-11-30 DIAGNOSIS — R53.83 OTHER FATIGUE: ICD-10-CM

## 2022-11-30 PROCEDURE — 1123F ACP DISCUSS/DSCN MKR DOCD: CPT | Performed by: INTERNAL MEDICINE

## 2022-11-30 PROCEDURE — G0439 PPPS, SUBSEQ VISIT: HCPCS | Performed by: INTERNAL MEDICINE

## 2022-11-30 SDOH — ECONOMIC STABILITY: FOOD INSECURITY: WITHIN THE PAST 12 MONTHS, THE FOOD YOU BOUGHT JUST DIDN'T LAST AND YOU DIDN'T HAVE MONEY TO GET MORE.: NEVER TRUE

## 2022-11-30 SDOH — ECONOMIC STABILITY: FOOD INSECURITY: WITHIN THE PAST 12 MONTHS, YOU WORRIED THAT YOUR FOOD WOULD RUN OUT BEFORE YOU GOT MONEY TO BUY MORE.: NEVER TRUE

## 2022-11-30 SDOH — HEALTH STABILITY: PHYSICAL HEALTH: ON AVERAGE, HOW MANY MINUTES DO YOU ENGAGE IN EXERCISE AT THIS LEVEL?: 60 MIN

## 2022-11-30 SDOH — HEALTH STABILITY: PHYSICAL HEALTH: ON AVERAGE, HOW MANY DAYS PER WEEK DO YOU ENGAGE IN MODERATE TO STRENUOUS EXERCISE (LIKE A BRISK WALK)?: 7 DAYS

## 2022-11-30 ASSESSMENT — PATIENT HEALTH QUESTIONNAIRE - PHQ9
2. FEELING DOWN, DEPRESSED OR HOPELESS: 0
SUM OF ALL RESPONSES TO PHQ QUESTIONS 1-9: 0
1. LITTLE INTEREST OR PLEASURE IN DOING THINGS: 0
SUM OF ALL RESPONSES TO PHQ9 QUESTIONS 1 & 2: 0
SUM OF ALL RESPONSES TO PHQ QUESTIONS 1-9: 0

## 2022-11-30 ASSESSMENT — LIFESTYLE VARIABLES
HOW OFTEN DO YOU HAVE A DRINK CONTAINING ALCOHOL: 1
HOW OFTEN DO YOU HAVE SIX OR MORE DRINKS ON ONE OCCASION: 1
HOW OFTEN DO YOU HAVE A DRINK CONTAINING ALCOHOL: NEVER
HOW MANY STANDARD DRINKS CONTAINING ALCOHOL DO YOU HAVE ON A TYPICAL DAY: PATIENT DOES NOT DRINK
HOW MANY STANDARD DRINKS CONTAINING ALCOHOL DO YOU HAVE ON A TYPICAL DAY: 0

## 2022-11-30 ASSESSMENT — SOCIAL DETERMINANTS OF HEALTH (SDOH): HOW HARD IS IT FOR YOU TO PAY FOR THE VERY BASICS LIKE FOOD, HOUSING, MEDICAL CARE, AND HEATING?: NOT HARD AT ALL

## 2022-11-30 NOTE — PROGRESS NOTES
Medicare Annual Wellness Visit    Almaz Cary is here for Medicare AWV    Assessment & Plan   Medicare annual wellness visit, subsequent        Right lower back pain and hip pain. Left kidney stone   Recommendations for Preventive Services Due: see orders and patient instructions/AVS.  Recommended screening schedule for the next 5-10 years is provided to the patient in written form: see Patient Instructions/AVS.     Return for Medicare Annual Wellness Visit in 1 year. Subjective       ICD-10-CM    1. Medicare annual wellness visit, subsequent  Z00.00          Patient's complete Health Risk Assessment and screening values have been reviewed and are found in Flowsheets. The following problems were reviewed today and where indicated follow up appointments were made and/or referrals ordered.     Positive Risk Factor Screenings with Interventions:             General Health and ACP:  General  In general, how would you say your health is?: Very Good  In the past 7 days, have you experienced any of the following: New or Increased Pain, New or Increased Fatigue, Loneliness, Social Isolation, Stress or Anger?: (!) Yes  Select all that apply: (!) New or Increased Pain  Do you get the social and emotional support that you need?: Yes  Do you have a Living Will?: Yes    Advance Directives       Power of  Living Will ACP-Advance Directive ACP-Power of     Not on File Filed on 08/09/13 Filed Not on File        General Health Risk Interventions:  Pain issues: referred to Orthopedic surgery     Health Habits/Nutrition:  Physical Activity: Sufficiently Active    Days of Exercise per Week: 7 days    Minutes of Exercise per Session: 60 min     Have you lost any weight without trying in the past 3 months?: No  Body mass index: (!) 31.23  Have you seen the dentist within the past year?: Yes  Health Habits/Nutrition Interventions:  He is riding his bike more regularly      Safety:  Do you have working smoke detectors?: Yes  Do you have any tripping hazards - loose or unsecured carpets or rugs?: No  Do you have any tripping hazards - clutter in doorways, halls, or stairs?: No  Do you have either shower bars, grab bars, non-slip mats or non-slip surfaces in your shower or bathtub?: (!) No  Do all of your stairways have a railing or banister?: Yes  Do you always fasten your seatbelt when you are in a car?: Yes  Safety Interventions:  Home safety tips provided           Objective   Vitals:    11/30/22 1440   BP: 130/80   Site: Left Upper Arm   Position: Sitting   Pulse: 73   Temp: 98.6 °F (37 °C)   SpO2: 100%   Weight: 205 lb 6.4 oz (93.2 kg)   Height: 5' 8\" (1.727 m)      Body mass index is 31.23 kg/m². General Appearance: alert and oriented to person, place and time, well developed and well- nourished, in no acute distress  Skin: warm and dry, no rash or erythema  Head: normocephalic and atraumatic  Eyes:  extraocular eye movements intact, conjunctivae normal  ENT: tympanic membrane, external ear and ear canal normal bilaterally  Neck: supple and non-tender without mass, no thyromegaly or thyroid nodules, no cervical lymphadenopathy  Pulmonary/Chest: clear to auscultation bilaterally- no wheezes, rales or rhonchi, normal air movement, no respiratory distress  Cardiovascular: normal rate, regular rhythm, normal S1 and S2, no murmur  Abdomen: soft, non-tender, non-distended, normal bowel sounds, no masses or organomegaly  Extremities: no cyanosis, clubbing or edema  Musculoskeletal: normal range of motion, no joint swelling, deformity or tenderness  Neurologic:  no cranial nerve deficit, gait, coordination and speech normal       Allergies   Allergen Reactions    Pcn [Penicillins] Other (See Comments)     Childhood BLACK TONGUE      Prior to Visit Medications    Medication Sig Taking?  Authorizing Provider   levothyroxine (SYNTHROID) 50 MCG tablet TAKE 1 TABLET BY MOUTH EVERY DAY AT NIGHT Yes Harper Palomo MD   vitamin D 1000 UNITS CAPS Take 2,000 Units by mouth nightly  Yes Historical Provider, MD Dean (Including outside providers/suppliers regularly involved in providing care):   Patient Care Team:  Sirisha Simpson MD as PCP - General (Internal Medicine)  Sirisha Simpson MD as PCP - [address] [place] [address] [facility]  Chintan Joseph MD as Consulting Physician (Otolaryngology)  Man Porter DO as Consulting Physician (Otolaryngology)     Reviewed and updated this visit:  Tobacco  Allergies  Meds  Med Hx  Surg Hx  Soc Hx  Fam Hx

## 2022-11-30 NOTE — PATIENT INSTRUCTIONS
Personalized Preventive Plan for Noé Spence - 11/30/2022  Medicare offers a range of preventive health benefits. Some of the tests and screenings are paid in full while other may be subject to a deductible, co-insurance, and/or copay. Some of these benefits include a comprehensive review of your medical history including lifestyle, illnesses that may run in your family, and various assessments and screenings as appropriate. After reviewing your medical record and screening and assessments performed today your provider may have ordered immunizations, labs, imaging, and/or referrals for you. A list of these orders (if applicable) as well as your Preventive Care list are included within your After Visit Summary for your review. Other Preventive Recommendations:    A preventive eye exam performed by an eye specialist is recommended every 1-2 years to screen for glaucoma; cataracts, macular degeneration, and other eye disorders. A preventive dental visit is recommended every 6 months. Try to get at least 150 minutes of exercise per week or 10,000 steps per day on a pedometer . Order or download the FREE \"Exercise & Physical Activity: Your Everyday Guide\" from [employer]. Call 3-850.859.7846 or search [employer] online. You need 5648-1191 mg of calcium and 4928-9601 IU of vitamin D per day. It is possible to meet your calcium requirement with diet alone, but a vitamin D supplement is usually necessary to meet this goal.  When exposed to the sun, use a sunscreen that protects against both UVA and UVB radiation with an SPF of 30 or greater. Reapply every 2 to 3 hours or after sweating, drying off with a towel, or swimming. Always wear a seat belt when traveling in a car. Always wear a helmet when riding a bicycle or motorcycle.

## 2022-12-01 LAB
A/G RATIO: 2.2 (ref 1.1–2.2)
ALBUMIN SERPL-MCNC: 4.6 G/DL (ref 3.4–5)
ALP BLD-CCNC: 91 U/L (ref 40–129)
ALT SERPL-CCNC: 23 U/L (ref 10–40)
ANION GAP SERPL CALCULATED.3IONS-SCNC: 14 MMOL/L (ref 3–16)
AST SERPL-CCNC: 25 U/L (ref 15–37)
BASOPHILS ABSOLUTE: 0.1 K/UL (ref 0–0.2)
BASOPHILS RELATIVE PERCENT: 0.7 %
BILIRUB SERPL-MCNC: 0.5 MG/DL (ref 0–1)
BUN BLDV-MCNC: 17 MG/DL (ref 7–20)
CALCIUM SERPL-MCNC: 9.2 MG/DL (ref 8.3–10.6)
CHLORIDE BLD-SCNC: 104 MMOL/L (ref 99–110)
CHOLESTEROL, TOTAL: 155 MG/DL (ref 0–199)
CO2: 24 MMOL/L (ref 21–32)
CREAT SERPL-MCNC: 0.7 MG/DL (ref 0.8–1.3)
EOSINOPHILS ABSOLUTE: 0 K/UL (ref 0–0.6)
EOSINOPHILS RELATIVE PERCENT: 0.4 %
ESTIMATED AVERAGE GLUCOSE: 114 MG/DL
GFR SERPL CREATININE-BSD FRML MDRD: >60 ML/MIN/{1.73_M2}
GLUCOSE BLD-MCNC: 85 MG/DL (ref 70–99)
HBA1C MFR BLD: 5.6 %
HCT VFR BLD CALC: 42.7 % (ref 40.5–52.5)
HDLC SERPL-MCNC: 49 MG/DL (ref 40–60)
HEMOGLOBIN: 14.4 G/DL (ref 13.5–17.5)
LDL CHOLESTEROL CALCULATED: 81 MG/DL
LYMPHOCYTES ABSOLUTE: 1.7 K/UL (ref 1–5.1)
LYMPHOCYTES RELATIVE PERCENT: 21.9 %
MCH RBC QN AUTO: 33.2 PG (ref 26–34)
MCHC RBC AUTO-ENTMCNC: 33.6 G/DL (ref 31–36)
MCV RBC AUTO: 98.8 FL (ref 80–100)
MONOCYTES ABSOLUTE: 0.6 K/UL (ref 0–1.3)
MONOCYTES RELATIVE PERCENT: 8.3 %
NEUTROPHILS ABSOLUTE: 5.3 K/UL (ref 1.7–7.7)
NEUTROPHILS RELATIVE PERCENT: 68.7 %
PDW BLD-RTO: 14.3 % (ref 12.4–15.4)
PLATELET # BLD: 165 K/UL (ref 135–450)
PMV BLD AUTO: 8.8 FL (ref 5–10.5)
POTASSIUM SERPL-SCNC: 4.2 MMOL/L (ref 3.5–5.1)
PROSTATE SPECIFIC ANTIGEN: 2.88 NG/ML (ref 0–4)
RBC # BLD: 4.32 M/UL (ref 4.2–5.9)
SODIUM BLD-SCNC: 142 MMOL/L (ref 136–145)
T4 FREE: 1.3 NG/DL (ref 0.9–1.8)
TOTAL PROTEIN: 6.7 G/DL (ref 6.4–8.2)
TRIGL SERPL-MCNC: 125 MG/DL (ref 0–150)
TSH SERPL DL<=0.05 MIU/L-ACNC: 2.74 UIU/ML (ref 0.27–4.2)
VLDLC SERPL CALC-MCNC: 25 MG/DL
WBC # BLD: 7.7 K/UL (ref 4–11)

## 2022-12-05 ENCOUNTER — OFFICE VISIT (OUTPATIENT)
Dept: ENT CLINIC | Age: 72
End: 2022-12-05
Payer: COMMERCIAL

## 2022-12-05 VITALS
DIASTOLIC BLOOD PRESSURE: 89 MMHG | HEART RATE: 76 BPM | SYSTOLIC BLOOD PRESSURE: 149 MMHG | HEIGHT: 68 IN | BODY MASS INDEX: 31.07 KG/M2 | WEIGHT: 205 LBS

## 2022-12-05 DIAGNOSIS — Z12.81: ICD-10-CM

## 2022-12-05 DIAGNOSIS — C02.3 CARCINOMA OF ANTERIOR TWO-THIRDS OF TONGUE (HCC): Primary | ICD-10-CM

## 2022-12-05 PROCEDURE — 99212 OFFICE O/P EST SF 10 MIN: CPT | Performed by: OTOLARYNGOLOGY

## 2022-12-05 PROCEDURE — 1123F ACP DISCUSS/DSCN MKR DOCD: CPT | Performed by: OTOLARYNGOLOGY

## 2022-12-05 NOTE — PROGRESS NOTES
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 He had more leukoplakia which was biopsied August 2021 and found to be only hyperplasia. No new symptoms. PAST MEDICAL HISTORY:   Social History     Tobacco Use   Smoking Status Never   Smokeless Tobacco Never                                                    Social History     Substance and Sexual Activity   Alcohol Use No                                                    Current Outpatient Medications:     levothyroxine (SYNTHROID) 50 MCG tablet, TAKE 1 TABLET BY MOUTH EVERY DAY AT NIGHT, Disp: 90 tablet, Rfl: 1    vitamin D 1000 UNITS CAPS, Take 2,000 Units by mouth nightly , Disp: , Rfl:                                                  Past Medical History:   Diagnosis Date    Cancer ([facility] [address]     skin cancer    Hyperlipidemia     Kidney stone 05/13/2020    Scalp lesion 2017    squamous cell Ca    Sinus congestion     Thyroid disease                                                     Past Surgical History:   Procedure Laterality Date    COLONOSCOPY  04/04/2005    COLONOSCOPY  01/05/2018    small polyps 5-10 years adenomatousvs hyperplastic    CYSTOSCOPY Right 05/14/2020    CYSTOSCOPY, RIGHT RETROGRADE PYLOGRAM, RIGHT URETEROSCOPY, LASER, STONE MANIPULATION AND EXTRACTION AND RIGHT STENT INSERTION performed by Bernarda Dakin, MD at [address]    GLOSSECTOMY Left 9/30/2021    PARTIAL RESECTION OF LEFT ORAL TONGUE performed by Florette Cheadle, MD at [address] [facility]  2016    Dr Karin Quiroz, scalp lesion , squamous cell cancer     MOUTH SURGERY Left 3/19/2021    EXCISE LESION LEFT ORAL TONGUE performed by Florette Cheadle, MD at [address]: Family history reviewed and except as pertinent to the interim history is not contributory.       REVIEW OF SYSTEMS:  All pertinent positive and negative findings included in HPI. Otherwise, all other systems are reviewed and are negative    PHYSICAL EXAMINATION:   GENERAL: wdwn- no acute distress  RESPIRATORY: No stridor. COMMUNICATION :  Normal voice  MENTAL STATUS: Alert and oriented x3  HEAD AND FACE: No skin lesions of the face. EXTERNAL EARS AND NOSE: The external ears and nasal pyramid are normal.  FACIAL MUSCLES: All branches of the facial nerve intact. EXTRAOCULAR MUSCLES: Intact with full range of motion. LIPS, TEETH, GINGIVA:  Normal mucosa  PHARYNX: The excision site on the left oral tongue is well-healed. There is a little bit of leukoplakia posterior to the original excision site but this is soft. NECK:  No masses. LYMPH NODES: No cervical lymphadenopathy. SALIVARY GLANDS: Parotid and submandibular glands normal.  THYROID: No goiter or thyroid nodules palpable. IMPRESSION: Leukoplakia of the left tongue. PLAN: I am comfortable observing at this point. After discussion with patient he agrees to revisit this in 1 month. FOLLOW-UP: 1 month.

## 2023-01-03 RX ORDER — LEVOTHYROXINE SODIUM 0.05 MG/1
TABLET ORAL
Qty: 90 TABLET | Refills: 2 | Status: SHIPPED | OUTPATIENT
Start: 2023-01-03

## 2023-01-09 ENCOUNTER — OFFICE VISIT (OUTPATIENT)
Dept: ENT CLINIC | Age: 73
End: 2023-01-09
Payer: COMMERCIAL

## 2023-01-09 VITALS
HEART RATE: 88 BPM | DIASTOLIC BLOOD PRESSURE: 85 MMHG | WEIGHT: 208.2 LBS | TEMPERATURE: 97.6 F | HEIGHT: 68 IN | SYSTOLIC BLOOD PRESSURE: 138 MMHG | BODY MASS INDEX: 31.55 KG/M2

## 2023-01-09 DIAGNOSIS — Z12.81: ICD-10-CM

## 2023-01-09 DIAGNOSIS — K14.8 LESION OF TONGUE: ICD-10-CM

## 2023-01-09 DIAGNOSIS — C02.3 CARCINOMA OF ANTERIOR TWO-THIRDS OF TONGUE (HCC): Primary | ICD-10-CM

## 2023-01-09 PROCEDURE — 99213 OFFICE O/P EST LOW 20 MIN: CPT | Performed by: OTOLARYNGOLOGY

## 2023-01-09 PROCEDURE — 1123F ACP DISCUSS/DSCN MKR DOCD: CPT | Performed by: OTOLARYNGOLOGY

## 2023-01-09 NOTE — PROGRESS NOTES
Now at 2 years since we excised a squamous cell carcinoma of the left anterior tongue. Had more leukoplakia which was biopsied 1-1/2 years ago and found to be hyperplastic. Patient has another area of leukoplakia on the tongue which he is concerned. It is posterior to the previous excision site. It is soft to palpation. After discussion with patient, he is anxious to be certain that this is not recurrent carcinoma so we will do a biopsy in the office under local anesthesia.

## 2023-01-16 ENCOUNTER — PROCEDURE VISIT (OUTPATIENT)
Dept: ENT CLINIC | Age: 73
End: 2023-01-16
Payer: COMMERCIAL

## 2023-01-16 VITALS
SYSTOLIC BLOOD PRESSURE: 144 MMHG | HEIGHT: 68 IN | HEART RATE: 87 BPM | DIASTOLIC BLOOD PRESSURE: 87 MMHG | TEMPERATURE: 98 F | BODY MASS INDEX: 31.52 KG/M2 | WEIGHT: 208 LBS

## 2023-01-16 DIAGNOSIS — Z12.81: ICD-10-CM

## 2023-01-16 DIAGNOSIS — K14.8 LESION OF TONGUE: Primary | ICD-10-CM

## 2023-01-16 PROCEDURE — 41110 EXCISION OF TONGUE LESION: CPT | Performed by: OTOLARYNGOLOGY

## 2023-01-16 NOTE — PROGRESS NOTES
OPERATIVE NOTE    PREOPERATIVE DIAGNOSIS: Lesion of the left oral tongue. POSTOPERATIVE DIAGNOSIS: Same    PROCEDURE: Excisional biopsy lesion left oral tongue    SURGEON:  Joe [place]    ANAESTHESIA: HurriCaine 1% with epinephrine 1: 100,000. FINDINGS: Lesion of the left oral tongue on the lateral border adjacent to previous excision site is identified topically anesthetized with lidocaine and then injected with lidocaine 1% with epinephrine 1: 100,000. Lesion is grasped with a cup forceps and excised. Sent for permanent pathologic section. Silver nitrate cautery to the base. FOLLOW UP: 1 week.

## 2023-01-17 ENCOUNTER — OFFICE VISIT (OUTPATIENT)
Dept: ENT CLINIC | Age: 73
End: 2023-01-17
Payer: COMMERCIAL

## 2023-01-17 DIAGNOSIS — K14.8 LESION OF TONGUE: Primary | ICD-10-CM

## 2023-01-17 DIAGNOSIS — Z12.81: ICD-10-CM

## 2023-01-17 PROCEDURE — 99212 OFFICE O/P EST SF 10 MIN: CPT | Performed by: OTOLARYNGOLOGY

## 2023-01-17 PROCEDURE — 1123F ACP DISCUSS/DSCN MKR DOCD: CPT | Performed by: OTOLARYNGOLOGY

## 2023-01-19 NOTE — RESULT ENCOUNTER NOTE
I called patient to review pathology findings. With dysplasia but no overt carcinoma we will continue to perform surveillance on a regular basis.

## 2023-01-20 NOTE — PROGRESS NOTES
1 day following excisional biopsy lesion of the left lateral oral tongue. Patient wanted excision site examined. This is stable without bleeding. There was some silver nitrate exudate on the buccal mucosa which I cleaned with a 4 x 4. Follow-up: 5 days. Chief Complaint   Patient presents with   • Cough       HPI: A 13 year old female presents with a cough. Symptoms started about 5 days ago with sore throat and congestion and progressed to a cough. She continues to have a dry hacking cough and now has sore chest from persistent coughing. She has not had any fevers chills nausea vomiting shortness of breath or chest pain..    Past Medical History:   Diagnosis Date   • ADHD (attention deficit hyperactivity disorder), combined type 02/14/2013    under IEP (agitation when on Adderall)   • Anxiety 01/25/2013    DX [facility] Dr. 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    Exam:  Blood pressure 124/72, pulse 94, temperature 99.1 °F (37.3 °C), temperature source Oral, resp. rate 16, height 5' 2\" (1.575 m), weight 58 kg, last menstrual period 03/25/2019, SpO2 97 %.  Gen - Well developed, well nourished male, NAD  HEENT - normocephalic, atraumatic, normal nasal exam, oral cavity and visualized oropharynx are normal in appearance, moist mucous membranes, TMs are clear bilaterally  Neck - supple, no LAD, no thyromegaly  Heart - RRR, no murmurs  Lungs - CTA bilaterally, nml effort, no distress  Neuro - CN 2 - 12 intact, nml strength and sensation throughout, normal gait  Psych - normal mood/affect  Musculoskeletal/Skin - no gross deformity/lesions    Impression/Plan:   Viral bronchitis    Patient is provided with guaifenesin with codeine for  cough suppression and will also try fluticasone inhaler. Recheck if chest internus of breath fever or worsening symptoms.       Lorie Ha MD  04/16/19 [id number]

## 2023-02-23 ENCOUNTER — OFFICE VISIT (OUTPATIENT)
Dept: ENT CLINIC | Age: 73
End: 2023-02-23
Payer: COMMERCIAL

## 2023-02-23 VITALS
HEART RATE: 89 BPM | BODY MASS INDEX: 31.22 KG/M2 | WEIGHT: 206 LBS | SYSTOLIC BLOOD PRESSURE: 144 MMHG | DIASTOLIC BLOOD PRESSURE: 86 MMHG | TEMPERATURE: 97.3 F | HEIGHT: 68 IN

## 2023-02-23 DIAGNOSIS — K14.8 LESION OF TONGUE: Primary | ICD-10-CM

## 2023-02-23 DIAGNOSIS — J34.89 NASAL VESTIBULITIS: ICD-10-CM

## 2023-02-23 PROCEDURE — 99213 OFFICE O/P EST LOW 20 MIN: CPT | Performed by: OTOLARYNGOLOGY

## 2023-02-23 PROCEDURE — 1123F ACP DISCUSS/DSCN MKR DOCD: CPT | Performed by: OTOLARYNGOLOGY

## 2023-02-23 NOTE — PROGRESS NOTES
FOLLOW UP VISIT:    CHIEF COMPLAINT: History of carcinoma of the left lateral tongue. INTERIM HISTORY: History of squamous cell carcinoma in situ of the left oral tongue. Initial excision was done 2 years ago. I biopsied a suspicious area 2 months ago which showed high-grade dysplasia. He has returned for routine surveillance. No new symptoms. In addition, he has a 1 week history of sore area inside his naris on the right side superiorly and medially. No bleeding. No airway obstruction.     PAST MEDICAL HISTORY:   Social History     Tobacco Use   Smoking Status Never   Smokeless Tobacco Never                                                    Social History     Substance and Sexual Activity   Alcohol Use No                                                    Current Outpatient Medications:     levothyroxine (SYNTHROID) 50 MCG tablet, TAKE 1 TABLET BY MOUTH EVERY DAY AT NIGHT, Disp: 90 tablet, Rfl: 2    vitamin D 1000 UNITS CAPS, Take 2,000 Units by mouth nightly , Disp: , Rfl:                                                  Past Medical History:   Diagnosis Date    Cancer ([facility] [address]     skin cancer    Hyperlipidemia     Kidney stone 05/13/2020    Scalp lesion 2017    squamous cell Ca    Sinus congestion     Thyroid disease                                                     Past Surgical History:   Procedure Laterality Date    COLONOSCOPY  04/04/2005    COLONOSCOPY  01/05/2018    small polyps 5-10 years adenomatousvs hyperplastic    CYSTOSCOPY Right 05/14/2020    CYSTOSCOPY, RIGHT RETROGRADE PYLOGRAM, RIGHT URETEROSCOPY, LASER, STONE MANIPULATION AND EXTRACTION AND RIGHT STENT INSERTION performed by Perlita Sepulveda MD at [address]    GLOSSECTOMY Left 9/30/2021    PARTIAL RESECTION OF LEFT ORAL TONGUE performed by Bernie Chowdhury MD at [id number] [place] [address]  2016    Dr Ita Villeda, scalp lesion , squamous cell cancer     MOUTH SURGERY Left 3/19/2021    EXCISE LESION LEFT ORAL TONGUE performed by Jessica Horton Zehra Ervin MD at [address]: Family history reviewed and except as pertinent to the interim history is not contributory. REVIEW OF SYSTEMS:  All pertinent positive and negative findings included in HPI. Otherwise, all other systems are reviewed and are negative    PHYSICAL EXAMINATION:   GENERAL: wdwn- no acute distress  RESPIRATORY: No stridor. COMMUNICATION :  Normal voice  MENTAL STATUS: Alert and oriented x3  HEAD AND FACE: No skin lesions of the face. EXTERNAL EARS AND NOSE: The external ears and nasal pyramid are normal.  FACIAL MUSCLES: All branches of the facial nerve intact. FACE PALPATION: Zygomatic arches and orbital rims are intact. No tenderness over the sinuses. EXTRAOCULAR MUSCLES: Intact with full range of motion. OTOSCOPY:  Normal tympanic membranes, middle ear spaces, and external auditory canals. TUNING FORKS:  Rinne ++ Landers midline at 512 Hz  INTRANASAL:  Septum midline, turbinates normal, meati clear. Nasal vestibulitis is a right side superior medial.  LIPS, TEETH, GINGIVA:  Normal mucosa  PHARYNX: The oral tongue is unchanged in its appearance. There is a little area of irregularity near the previous excision site. On palpation there is no induration. NECK:  No masses. LYMPH NODES: No cervical lymphadenopathy. SALIVARY GLANDS: Parotid and submandibular glands normal.  THYROID: No goiter or thyroid nodules palpable. IMPRESSION: Surveillance following carcinoma of the tongue. Nasal vestibulitis. PLAN: I have asked the patient to consult my colleague, Dr. Shana Torres, to get a second opinion as to whether observation or more of a wide-field excision would be appropriate therapy. Mupirocin ointment prescribed to be applied 3 times daily to the site of the nasal vestibulitis  FOLLOW-UP: With Dr. Shana Torres initially, and then to follow-up with me.

## 2023-02-28 ENCOUNTER — OFFICE VISIT (OUTPATIENT)
Dept: ENT CLINIC | Age: 73
End: 2023-02-28
Payer: COMMERCIAL

## 2023-02-28 VITALS
DIASTOLIC BLOOD PRESSURE: 87 MMHG | BODY MASS INDEX: 31.83 KG/M2 | WEIGHT: 210 LBS | SYSTOLIC BLOOD PRESSURE: 159 MMHG | HEIGHT: 68 IN | HEART RATE: 81 BPM

## 2023-02-28 DIAGNOSIS — C02.3 CARCINOMA OF ANTERIOR TWO-THIRDS OF TONGUE (HCC): Primary | ICD-10-CM

## 2023-02-28 PROCEDURE — 1123F ACP DISCUSS/DSCN MKR DOCD: CPT | Performed by: OTOLARYNGOLOGY

## 2023-02-28 PROCEDURE — 99214 OFFICE O/P EST MOD 30 MIN: CPT | Performed by: OTOLARYNGOLOGY

## 2023-02-28 ASSESSMENT — ENCOUNTER SYMPTOMS
EYE ITCHING: 0
NAUSEA: 0
SHORTNESS OF BREATH: 0
VOICE CHANGE: 0
SINUS PRESSURE: 0
DIARRHEA: 0
COUGH: 0
RHINORRHEA: 0
EYE PAIN: 0
SORE THROAT: 0
EYE REDNESS: 0
TROUBLE SWALLOWING: 0
FACIAL SWELLING: 0
CHOKING: 0
SINUS PAIN: 0

## 2023-02-28 NOTE — LETTER
[facility]    Surgery Schedule Request Form: 02/28/23  [address] 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       Electronically signed by Jina Cha MD on 2/28/2023 at 1:23 PM

## 2023-02-28 NOTE — PROGRESS NOTES
Subjective:      Patient ID: Shelbie Corrales is a 67 y.o. male. HPI  Chief Complaint   Patient presents with    tongue dysplasia       History of Present Illness  Head/Neck    Salina Gonzales is a(n) 67 y.o. 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 Two prior surgeries. Biopsy last month. High grade dysplasia to margin. Here for surgical evaluation.    Pain: No  Location: Tongue left mid  Timing: waxing and waning  Otalgia: No  Odynophagia: No  Dysphagia: No  Voice Changes: No  Lumps in neck: No  Modifying Factors: Non smoker      Patient Active Problem List   Diagnosis    Hypothyroidism    Vitamin D deficiency    Heart palpitations    Myalgia    Pain of both wrist joints    Paresthesias    Pes planus of both feet    Pain in joint    Sicca syndrome (HCC)    Kidney stone    Hydronephrosis with ureteropelvic junction (UPJ) obstruction    Right flank pain    Elevated blood pressure reading without diagnosis of hypertension    MI (acute kidney injury) ([facility] [address]    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    Lesion of tongue    History of malignant neoplasm of tongue     Past Surgical History:   Procedure Laterality Date    COLONOSCOPY  04/04/2005    COLONOSCOPY  01/05/2018    small polyps 5-10 years adenomatousvs hyperplastic    CYSTOSCOPY Right 05/14/2020    CYSTOSCOPY, RIGHT RETROGRADE PYLOGRAM, RIGHT URETEROSCOPY, LASER, STONE MANIPULATION AND EXTRACTION AND RIGHT STENT INSERTION performed by Madhu Maier MD at [address]    GLOSSECTOMY Left 9/30/2021    PARTIAL RESECTION OF LEFT ORAL TONGUE performed by Shaji Rodriguez MD at [address] [facility]  2016    Dr Kylie Granados, scalp lesion , squamous cell cancer     MOUTH SURGERY Left 3/19/2021    EXCISE LESION LEFT ORAL TONGUE performed by Shaji Rodriguez MD at [address] [facility]       Family History   Problem Relation Age of Onset    No Known Problems Mother     No Known Problems Father Social History     Socioeconomic History    Marital status: Single     Spouse name: Not on file    Number of children: Not on file    Years of education: Not on file    Highest education level: Not on file   Occupational History    Occupation:     Tobacco Use    Smoking status: Never    Smokeless tobacco: Never   Vaping Use    Vaping Use: Never used   Substance and Sexual Activity    Alcohol use: No    Drug use: No    Sexual activity: Not on file   Other Topics Concern    Not on file   Social History Narrative    Not on file     Social Determinants of Health     Financial Resource Strain: Low Risk     Difficulty of Paying Living Expenses: Not hard at all   Food Insecurity: No Food Insecurity    Worried About Running Out of Food in the Last Year: Never true    [address] [facility] [address] of Food in the Last Year: Never true   Transportation Needs: Not on file   Physical Activity: Sufficiently Active    Days of Exercise per Week: 7 days    Minutes of Exercise per Session: 60 min   Stress: Not on file   Social Connections: Not on file   Intimate Partner Violence: Not on file   Housing Stability: Not on file       DRUG/FOOD ALLERGIES: Pcn [penicillins]    CURRENT MEDICATIONS  Prior to Admission medications    Medication Sig Start Date End Date Taking? Authorizing Provider   mupirocin (BACTROBAN) 2 % ointment Apply topically 3 times daily.  2/23/23   Loi Ma MD   levothyroxine (SYNTHROID) 50 MCG tablet TAKE 1 TABLET BY MOUTH EVERY DAY AT NIGHT 1/3/23   Allan Hauser MD   vitamin D 1000 UNITS CAPS Take 2,000 Units by mouth nightly     Historical Provider, MD       Lab Studies:  Lab Results   Component Value Date    WBC 7.7 11/30/2022    HGB 14.4 11/30/2022    HCT 42.7 11/30/2022    MCV 98.8 11/30/2022     11/30/2022     Lab Results   Component Value Date    GLUCOSE 85 11/30/2022    BUN 17 11/30/2022    CREATININE 0.7 (L) 11/30/2022    K 4.2 11/30/2022     11/30/2022     11/30/2022    CALCIUM 9.2 11/30/2022     Lab Results   Component Value Date    MG 2.20 07/14/2015     No results found for: PHOS  Lab Results   Component Value Date    ALKPHOS 91 11/30/2022    ALT 23 11/30/2022    AST 25 11/30/2022    BILITOT 0.5 11/30/2022    PROT 6.7 11/30/2022        Review of Systems   Constitutional:  Negative for activity change, appetite change, chills, fatigue and fever. HENT:  Negative for congestion, ear discharge, ear pain, facial swelling, hearing loss, nosebleeds, postnasal drip, rhinorrhea, sinus pressure, sinus pain, sneezing, sore throat, tinnitus, trouble swallowing and voice change. Eyes:  Negative for pain, redness, itching and visual disturbance. Respiratory:  Negative for cough, choking and shortness of breath. Gastrointestinal:  Negative for diarrhea and nausea. Endocrine: Negative for cold intolerance and heat intolerance. Objective:   Physical Exam  Constitutional:       General: He is not in acute distress. Appearance: He is well-developed. HENT:      Head: Not macrocephalic and not microcephalic. No abrasion or contusion. Mouth/Throat:      Lips: No lesions. Mouth: No oral lesions. Dentition: Normal dentition. Tongue: No lesions. Palate: No mass. Pharynx: No oropharyngeal exudate, posterior oropharyngeal erythema or uvula swelling. Tonsils: No tonsillar abscesses. Neck:      Thyroid: No thyroid mass or thyromegaly. Trachea: No tracheal tenderness or tracheal deviation. Cardiovascular:      Rate and Rhythm: Normal rate and regular rhythm. Pulmonary:      Breath sounds: No stridor. Musculoskeletal:      Cervical back: Normal range of motion and neck supple. No edema or erythema. No muscular tenderness. Lymphadenopathy:      Head:      Right side of head: No submental, submandibular, tonsillar, preauricular, posterior auricular or occipital adenopathy.       Left side of head: No submental, submandibular, tonsillar, preauricular or occipital adenopathy. Cervical: No cervical adenopathy. Right cervical: No superficial, deep or posterior cervical adenopathy. Left cervical: No superficial, deep or posterior cervical adenopathy. Skin:     General: Skin is warm and dry. Findings: No lesion. Neurological:      Mental Status: He is alert and oriented to person, place, and time. Psychiatric:         Mood and Affect: Mood is not anxious or depressed. Left tongue lesion:   - Low and high-grade dysplasia with focal area worrisome for invasion. See comment. COMMENT:    Sections show squamous mucosa with low and high-grade   dysplasia. In addition, there is a small focus of atypical squamous   cells in the stroma with associated lymphoid tissue. This focus does not   appear to be connected to the surface in the sections examined. This   small focus extends to the deep inked margin. It is uncertain whether   this represent a small focus of invasion or tangential cut effect. Clinical correlation is recommended. [place]         Assessment:       Diagnosis Orders   1. Carcinoma of anterior two-thirds of tongue ([facility] [address]                Plan:      OR for excisional biopsy tongue, left. The patient has a diagnosis of tongue mass which has not been responsive or cannot be treated with maximal medical therapy. The patient has been advised of options including further medical therapy, surgery, or nothing. The risks and benefits of surgery were described not limited to infection, bleeding, airway and pharyngeal fistula, scars including hypertrophic and keloids, recurrence of disease  and need for further surgical management of disease. Patient expectations during and after surgery including post-operative care and pain control were described. Other health co-morbidities that would increase the risks of bodily harm, complications and risk of death including none were discussed .  Questions were answered and the patient agreed to proceed with surgery which will be scheduled in an appropriate time frame in line with the severity of disease.           Joseph Gold MD

## 2023-03-22 ENCOUNTER — OFFICE VISIT (OUTPATIENT)
Dept: INTERNAL MEDICINE CLINIC | Age: 73
End: 2023-03-22

## 2023-03-22 VITALS
OXYGEN SATURATION: 96 % | WEIGHT: 210 LBS | DIASTOLIC BLOOD PRESSURE: 82 MMHG | HEART RATE: 89 BPM | HEIGHT: 68 IN | BODY MASS INDEX: 31.83 KG/M2 | SYSTOLIC BLOOD PRESSURE: 138 MMHG | TEMPERATURE: 97.5 F

## 2023-03-22 DIAGNOSIS — E03.4 HYPOTHYROIDISM DUE TO ACQUIRED ATROPHY OF THYROID: ICD-10-CM

## 2023-03-22 DIAGNOSIS — Z01.818 PRE-OP EXAM: Primary | ICD-10-CM

## 2023-03-22 DIAGNOSIS — D37.02 NEOPLASM OF UNCERTAIN BEHAVIOR OF ANTERIOR TWO-THIRDS OF TONGUE: ICD-10-CM

## 2023-03-22 SDOH — ECONOMIC STABILITY: HOUSING INSECURITY
IN THE LAST 12 MONTHS, WAS THERE A TIME WHEN YOU DID NOT HAVE A STEADY PLACE TO SLEEP OR SLEPT IN A SHELTER (INCLUDING NOW)?: PATIENT REFUSED

## 2023-03-22 SDOH — ECONOMIC STABILITY: FOOD INSECURITY: WITHIN THE PAST 12 MONTHS, YOU WORRIED THAT YOUR FOOD WOULD RUN OUT BEFORE YOU GOT MONEY TO BUY MORE.: PATIENT DECLINED

## 2023-03-22 SDOH — ECONOMIC STABILITY: FOOD INSECURITY: WITHIN THE PAST 12 MONTHS, THE FOOD YOU BOUGHT JUST DIDN'T LAST AND YOU DIDN'T HAVE MONEY TO GET MORE.: PATIENT DECLINED

## 2023-03-22 SDOH — ECONOMIC STABILITY: INCOME INSECURITY: HOW HARD IS IT FOR YOU TO PAY FOR THE VERY BASICS LIKE FOOD, HOUSING, MEDICAL CARE, AND HEATING?: PATIENT DECLINED

## 2023-03-22 ASSESSMENT — PATIENT HEALTH QUESTIONNAIRE - PHQ9: DEPRESSION UNABLE TO ASSESS: PT REFUSES

## 2023-03-22 NOTE — PROGRESS NOTES
of anterior two-thirds of tongue  Unimproved neoplasm on the tongue. 3. Hypothyroidism due to acquired atrophy of thyroid  Stable. Continue thyroid replacement. He is medically cleared for surgery and anesthesia.     Copy to Dr. Adwoa Drummond

## 2023-03-27 NOTE — PROGRESS NOTES
Place patient label inside box (if no patient label, complete below)  Name:  :  MR#:     Nancy Saldana / PROCEDURE  I (we)Osmin authorize DR Luis Miguel Anaya  and/or such assistants as may be selected by him/her, to perform the following operation/procedure(s): EXCISIONAL BIOPSY ANTERIOR THIRD OF LEFT SIDE OF TONGUE       Note: If unable to obtain consent prior to an emergent procedure, document the emergent reason in the medical record. This procedure has been explained to my (our) satisfaction and included in the explanation was: The intended benefit, nature, and extent of the procedure to be performed; The significant risks involved and the probability of success; Alternative procedures and methods of treatment; The dangers and probable consequences of such alternatives (including no procedure or treatment); The expected consequences of the procedure on my future health; Whether other qualified individuals would be performing important surgical tasks and/or whether  would be present to advise or support the procedure. I (we) understand that there are other risks of infection and other serious complications in the pre-operative/procedural and postoperative/procedural stages of my (our) care. I (we) have asked all of the questions which I (we) thought were important in deciding whether or not to undergo treatment or diagnosis. These questions have been answered to my (our) satisfaction. I (we) understand that no assurance can be given that the procedure will be a success, and no guarantee or warranty of success has been given to me (us). It has been explained to me (us) that during the course of the operation/procedure, unforeseen conditions may be revealed that necessitate extension of the original procedure(s) or different procedure(s) than those set forth in Paragraph 1.  I (we) authorize and request that the ______________________________________________________________________________________________    If a phone consent is obtained, consent will be documented by using two health care professionals, each affirming that the consenting party has no questions and gives consent for the procedure discussed with the physician/provider.   _____________________          ____________________       _____/_____am/pm   2nd witness to phone consent        Printed name           Date / Time    Informed Consent:  I have provided the explanation described above in section 1 to the patient and/or legal representative.  I have provided the patient and/or legal representative with an opportunity to ask any questions about the proposed operation/procedure.   ___________________________          ____________________         ____/____am/pm  Provider / Proceduralist                            Printed name            Date / Time  Revised 8/2/2021                                                                      Page 2 of 2

## 2023-03-27 NOTE — PROGRESS NOTES
PRE-OP INSTRUCTIONS FOR SURGICAL PATIENTS          Our Pre-admission Testing Nurses tried and were unable to reach you today. Please read the attached instructions if you did not listen to your voicemail. Follow all instructions provided to you from your surgeon's office, including your ARRIVAL TIME. Arrange for someone to drive you home and be with you for the first 24 hours after discharge. NOTE: at this time ONLY 2 ADULTS may accompany you   One person encouraged to stay at hospital entire time if outpatient surgery    Enter the MAIN entrance located on [address] and report to the surgical desk on the LEFT side of the lobby. Please park in the parking garage or there is free [employer] available after 7am for your use. Bring your insurance card & photo ID with you to register. Bring your medication list with you with dose and frequency listed (including over the counter medications)  Contact your ordering physician/surgeon for medication instructions as soon as possible, especially if taking blood thinners, aspirin, heart, or diabetic medication. Bariatric surgical patients need to call your surgeon if on diabetic medications (as some may need to be stopped 1-week preop)  A Pre-Surgical History and Physical MUST be completed WITHIN 30 DAYS OR LESS prior to your procedure by your Physician or an Urgent Care. DO NOT EAT ANYTHING 8 hours prior to arrival for surgery. You may have sips of WATER ONLY (up to 8 ounces) 4 hours prior to your arrival for surgery. Then nothing further 4 hours prior to arriving at hospital.   NOTE: ALL Gastric, Bariatric & Bowel surgery patients - you MUST follow your surgeon's instructions regarding eating/drinking as you will have very specific instructions to follow. If you did not receive these, call your surgeon's office immediately. No gum, candy, mints, or ice chips day of procedure.    Please refrain from drinking alcohol the day before or day of your

## 2023-03-30 ENCOUNTER — ANESTHESIA EVENT (OUTPATIENT)
Dept: OPERATING ROOM | Age: 73
End: 2023-03-30
Payer: COMMERCIAL

## 2023-03-31 ENCOUNTER — HOSPITAL ENCOUNTER (OUTPATIENT)
Age: 73
Setting detail: OUTPATIENT SURGERY
Discharge: HOME | End: 2023-03-31
Attending: OTOLARYNGOLOGY | Admitting: OTOLARYNGOLOGY
Payer: COMMERCIAL

## 2023-03-31 ENCOUNTER — ANESTHESIA (OUTPATIENT)
Dept: OPERATING ROOM | Age: 73
End: 2023-03-31
Payer: COMMERCIAL

## 2023-03-31 VITALS
SYSTOLIC BLOOD PRESSURE: 150 MMHG | HEIGHT: 68 IN | HEART RATE: 80 BPM | TEMPERATURE: 97.6 F | RESPIRATION RATE: 18 BRPM | WEIGHT: 203.48 LBS | OXYGEN SATURATION: 95 % | BODY MASS INDEX: 30.84 KG/M2 | DIASTOLIC BLOOD PRESSURE: 87 MMHG

## 2023-03-31 DIAGNOSIS — K14.9 TONGUE DYSPLASIA: ICD-10-CM

## 2023-03-31 PROBLEM — C02.3: Status: ACTIVE | Noted: 2023-03-31

## 2023-03-31 LAB
GLUCOSE BLD-MCNC: 124 MG/DL (ref 70–99)
PERFORMED ON: ABNORMAL

## 2023-03-31 PROCEDURE — 2500000003 HC RX 250 WO HCPCS: Performed by: OTOLARYNGOLOGY

## 2023-03-31 PROCEDURE — 3600000014 HC SURGERY LEVEL 4 ADDTL 15MIN: Performed by: OTOLARYNGOLOGY

## 2023-03-31 PROCEDURE — 88342 IMHCHEM/IMCYTCHM 1ST ANTB: CPT

## 2023-03-31 PROCEDURE — 41112 EXCISION OF TONGUE LESION: CPT | Performed by: OTOLARYNGOLOGY

## 2023-03-31 PROCEDURE — 3700000000 HC ANESTHESIA ATTENDED CARE: Performed by: OTOLARYNGOLOGY

## 2023-03-31 PROCEDURE — 6370000000 HC RX 637 (ALT 250 FOR IP): Performed by: ANESTHESIOLOGY

## 2023-03-31 PROCEDURE — 7100000010 HC PHASE II RECOVERY - FIRST 15 MIN: Performed by: OTOLARYNGOLOGY

## 2023-03-31 PROCEDURE — 7100000000 HC PACU RECOVERY - FIRST 15 MIN: Performed by: OTOLARYNGOLOGY

## 2023-03-31 PROCEDURE — 6360000002 HC RX W HCPCS

## 2023-03-31 PROCEDURE — 2709999900 HC NON-CHARGEABLE SUPPLY: Performed by: OTOLARYNGOLOGY

## 2023-03-31 PROCEDURE — 3700000001 HC ADD 15 MINUTES (ANESTHESIA): Performed by: OTOLARYNGOLOGY

## 2023-03-31 PROCEDURE — 2580000003 HC RX 258: Performed by: FAMILY MEDICINE

## 2023-03-31 PROCEDURE — 2500000003 HC RX 250 WO HCPCS

## 2023-03-31 PROCEDURE — 3600000004 HC SURGERY LEVEL 4 BASE: Performed by: OTOLARYNGOLOGY

## 2023-03-31 PROCEDURE — 7100000011 HC PHASE II RECOVERY - ADDTL 15 MIN: Performed by: OTOLARYNGOLOGY

## 2023-03-31 PROCEDURE — 7100000001 HC PACU RECOVERY - ADDTL 15 MIN: Performed by: OTOLARYNGOLOGY

## 2023-03-31 PROCEDURE — 88341 IMHCHEM/IMCYTCHM EA ADD ANTB: CPT

## 2023-03-31 PROCEDURE — 88307 TISSUE EXAM BY PATHOLOGIST: CPT

## 2023-03-31 PROCEDURE — 6360000002 HC RX W HCPCS: Performed by: NURSE ANESTHETIST, CERTIFIED REGISTERED

## 2023-03-31 RX ORDER — SODIUM CHLORIDE 0.9 % (FLUSH) 0.9 %
5-40 SYRINGE (ML) INJECTION EVERY 12 HOURS SCHEDULED
Status: DISCONTINUED | OUTPATIENT
Start: 2023-03-31 | End: 2023-03-31 | Stop reason: HOSPADM

## 2023-03-31 RX ORDER — OXYCODONE HYDROCHLORIDE 5 MG/1
5 TABLET ORAL ONCE
Status: COMPLETED | OUTPATIENT
Start: 2023-03-31 | End: 2023-03-31

## 2023-03-31 RX ORDER — LIDOCAINE HYDROCHLORIDE AND EPINEPHRINE 10; 10 MG/ML; UG/ML
INJECTION, SOLUTION INFILTRATION; PERINEURAL PRN
Status: DISCONTINUED | OUTPATIENT
Start: 2023-03-31 | End: 2023-03-31 | Stop reason: ALTCHOICE

## 2023-03-31 RX ORDER — ROCURONIUM BROMIDE 10 MG/ML
INJECTION, SOLUTION INTRAVENOUS PRN
Status: DISCONTINUED | OUTPATIENT
Start: 2023-03-31 | End: 2023-03-31 | Stop reason: SDUPTHER

## 2023-03-31 RX ORDER — DEXAMETHASONE SODIUM PHOSPHATE 4 MG/ML
INJECTION, SOLUTION INTRA-ARTICULAR; INTRALESIONAL; INTRAMUSCULAR; INTRAVENOUS; SOFT TISSUE PRN
Status: DISCONTINUED | OUTPATIENT
Start: 2023-03-31 | End: 2023-03-31 | Stop reason: SDUPTHER

## 2023-03-31 RX ORDER — LIDOCAINE HYDROCHLORIDE 20 MG/ML
INJECTION, SOLUTION INTRAVENOUS PRN
Status: DISCONTINUED | OUTPATIENT
Start: 2023-03-31 | End: 2023-03-31 | Stop reason: SDUPTHER

## 2023-03-31 RX ORDER — OXYCODONE HYDROCHLORIDE 5 MG/1
5 TABLET ORAL EVERY 6 HOURS PRN
Qty: 20 TABLET | Refills: 0 | Status: SHIPPED | OUTPATIENT
Start: 2023-03-31 | End: 2023-04-20

## 2023-03-31 RX ORDER — FENTANYL CITRATE 50 UG/ML
INJECTION, SOLUTION INTRAMUSCULAR; INTRAVENOUS PRN
Status: DISCONTINUED | OUTPATIENT
Start: 2023-03-31 | End: 2023-03-31 | Stop reason: SDUPTHER

## 2023-03-31 RX ORDER — PROPOFOL 10 MG/ML
INJECTION, EMULSION INTRAVENOUS PRN
Status: DISCONTINUED | OUTPATIENT
Start: 2023-03-31 | End: 2023-03-31 | Stop reason: SDUPTHER

## 2023-03-31 RX ORDER — LABETALOL HYDROCHLORIDE 5 MG/ML
10 INJECTION, SOLUTION INTRAVENOUS
Status: DISCONTINUED | OUTPATIENT
Start: 2023-03-31 | End: 2023-03-31 | Stop reason: HOSPADM

## 2023-03-31 RX ORDER — SODIUM CHLORIDE, SODIUM LACTATE, POTASSIUM CHLORIDE, CALCIUM CHLORIDE 600; 310; 30; 20 MG/100ML; MG/100ML; MG/100ML; MG/100ML
INJECTION, SOLUTION INTRAVENOUS CONTINUOUS
Status: DISCONTINUED | OUTPATIENT
Start: 2023-03-31 | End: 2023-03-31 | Stop reason: HOSPADM

## 2023-03-31 RX ORDER — DIPHENHYDRAMINE HYDROCHLORIDE 50 MG/ML
12.5 INJECTION INTRAMUSCULAR; INTRAVENOUS
Status: DISCONTINUED | OUTPATIENT
Start: 2023-03-31 | End: 2023-03-31 | Stop reason: HOSPADM

## 2023-03-31 RX ORDER — CLINDAMYCIN PHOSPHATE 600 MG/50ML
600 INJECTION INTRAVENOUS ONCE
Status: COMPLETED | OUTPATIENT
Start: 2023-03-31 | End: 2023-03-31

## 2023-03-31 RX ORDER — MEPERIDINE HYDROCHLORIDE 25 MG/ML
12.5 INJECTION INTRAMUSCULAR; INTRAVENOUS; SUBCUTANEOUS EVERY 5 MIN PRN
Status: DISCONTINUED | OUTPATIENT
Start: 2023-03-31 | End: 2023-03-31 | Stop reason: HOSPADM

## 2023-03-31 RX ORDER — PHENYLEPHRINE HYDROCHLORIDE 10 MG/ML
INJECTION INTRAVENOUS PRN
Status: DISCONTINUED | OUTPATIENT
Start: 2023-03-31 | End: 2023-03-31 | Stop reason: SDUPTHER

## 2023-03-31 RX ORDER — ONDANSETRON 2 MG/ML
INJECTION INTRAMUSCULAR; INTRAVENOUS PRN
Status: DISCONTINUED | OUTPATIENT
Start: 2023-03-31 | End: 2023-03-31 | Stop reason: SDUPTHER

## 2023-03-31 RX ORDER — SODIUM CHLORIDE 9 MG/ML
25 INJECTION, SOLUTION INTRAVENOUS PRN
Status: DISCONTINUED | OUTPATIENT
Start: 2023-03-31 | End: 2023-03-31 | Stop reason: HOSPADM

## 2023-03-31 RX ORDER — METOCLOPRAMIDE HYDROCHLORIDE 5 MG/ML
10 INJECTION INTRAMUSCULAR; INTRAVENOUS
Status: DISCONTINUED | OUTPATIENT
Start: 2023-03-31 | End: 2023-03-31 | Stop reason: HOSPADM

## 2023-03-31 RX ORDER — SODIUM CHLORIDE 0.9 % (FLUSH) 0.9 %
5-40 SYRINGE (ML) INJECTION PRN
Status: DISCONTINUED | OUTPATIENT
Start: 2023-03-31 | End: 2023-03-31 | Stop reason: HOSPADM

## 2023-03-31 RX ORDER — HYDRALAZINE HYDROCHLORIDE 20 MG/ML
10 INJECTION INTRAMUSCULAR; INTRAVENOUS
Status: DISCONTINUED | OUTPATIENT
Start: 2023-03-31 | End: 2023-03-31 | Stop reason: HOSPADM

## 2023-03-31 RX ADMIN — FENTANYL CITRATE 50 MCG: 50 INJECTION, SOLUTION INTRAMUSCULAR; INTRAVENOUS at 07:32

## 2023-03-31 RX ADMIN — PHENYLEPHRINE HYDROCHLORIDE 100 MCG: 10 INJECTION INTRAVENOUS at 07:56

## 2023-03-31 RX ADMIN — ROCURONIUM BROMIDE 90 MG: 10 INJECTION INTRAVENOUS at 07:32

## 2023-03-31 RX ADMIN — PROPOFOL 120 MG: 10 INJECTION, EMULSION INTRAVENOUS at 07:32

## 2023-03-31 RX ADMIN — SUGAMMADEX 200 MG: 100 INJECTION, SOLUTION INTRAVENOUS at 08:11

## 2023-03-31 RX ADMIN — SODIUM CHLORIDE, POTASSIUM CHLORIDE, SODIUM LACTATE AND CALCIUM CHLORIDE: 600; 310; 30; 20 INJECTION, SOLUTION INTRAVENOUS at 06:30

## 2023-03-31 RX ADMIN — FENTANYL CITRATE 25 MCG: 50 INJECTION, SOLUTION INTRAMUSCULAR; INTRAVENOUS at 08:19

## 2023-03-31 RX ADMIN — OXYCODONE HYDROCHLORIDE 5 MG: 5 TABLET ORAL at 09:42

## 2023-03-31 RX ADMIN — ONDANSETRON 4 MG: 2 INJECTION INTRAMUSCULAR; INTRAVENOUS at 07:52

## 2023-03-31 RX ADMIN — LIDOCAINE HYDROCHLORIDE 100 MG: 20 INJECTION, SOLUTION INTRAVENOUS at 07:32

## 2023-03-31 RX ADMIN — DEXAMETHASONE SODIUM PHOSPHATE 8 MG: 4 INJECTION, SOLUTION INTRAMUSCULAR; INTRAVENOUS at 07:41

## 2023-03-31 RX ADMIN — PROPOFOL 80 MG: 10 INJECTION, EMULSION INTRAVENOUS at 07:37

## 2023-03-31 RX ADMIN — FENTANYL CITRATE 25 MCG: 50 INJECTION, SOLUTION INTRAMUSCULAR; INTRAVENOUS at 07:52

## 2023-03-31 RX ADMIN — CLINDAMYCIN PHOSPHATE 600 MG: 600 INJECTION, SOLUTION INTRAVENOUS at 07:31

## 2023-03-31 ASSESSMENT — PAIN DESCRIPTION - LOCATION
LOCATION: OTHER (COMMENT)
LOCATION: OTHER (COMMENT)

## 2023-03-31 ASSESSMENT — PAIN SCALES - GENERAL
PAINLEVEL_OUTOF10: 3
PAINLEVEL_OUTOF10: 0
PAINLEVEL_OUTOF10: 0
PAINLEVEL_OUTOF10: 3
PAINLEVEL_OUTOF10: 0

## 2023-03-31 ASSESSMENT — PAIN DESCRIPTION - FREQUENCY
FREQUENCY: CONTINUOUS
FREQUENCY: CONTINUOUS

## 2023-03-31 ASSESSMENT — PAIN DESCRIPTION - ORIENTATION
ORIENTATION: LEFT
ORIENTATION: LEFT

## 2023-03-31 ASSESSMENT — PAIN DESCRIPTION - PAIN TYPE
TYPE: SURGICAL PAIN
TYPE: SURGICAL PAIN

## 2023-03-31 ASSESSMENT — PAIN - FUNCTIONAL ASSESSMENT: PAIN_FUNCTIONAL_ASSESSMENT: 0-10

## 2023-03-31 ASSESSMENT — PAIN DESCRIPTION - DESCRIPTORS
DESCRIPTORS: BURNING
DESCRIPTORS: BURNING

## 2023-03-31 NOTE — OP NOTE
Operative Note      Patient: Siegfried Brunner  YOB: 1950  MRN: [id number]    Date of Procedure: 3/31/2023    Pre-Op Diagnosis: HIGH GRADE DYSPLASIA, TONGUE    Post-Op Diagnosis: Same       Procedure(s):  EXCISIONAL BIOPSY ANTERIOR THIRD OF LEFT SIDE OF TONGUE    Surgeon(s):  Carmel Coreas MD    Assistant:   * No surgical staff found *    Anesthesia: General    Estimated Blood Loss (mL): Minimal    Complications: None    Specimens:   * No specimens in log *    Implants:  * No implants in log *      Drains: * No LDAs found *    Findings: Irregular lesion left mid lateral tongue    Detailed Description of Procedure: The patient came into the operating room and was placed in a supine position on the operating room table. General IV anesthesia was provided and an endotracheal tube placed without difficulty. The table was turned and a self retracting mouth retractor was placed. A stitch was placed in the midline tongue for retraction. Seven cc's of local was injected around the left lateral lesion. The lesion was then excised in elliptical fashion with a needle tip bovie in a V wedge shaped. The margins were marked with 3-0 stitches. Long anterior, short inferior. The wound was closed in multiple layers with 3-0 Vicryl after saline irrigation. The retraction stitch was removed. The procedure was completed and the patient awoken from general anesthesia, extubated and sent to the PACU in stable condition.  .     Electronically signed by Toy Parks MD on 3/31/2023 at 7:34 AM

## 2023-03-31 NOTE — H&P
Known Problems Mother      No Known Problems Father           Social History               Socioeconomic History    Marital status: Single       Spouse name: Not on file    Number of children: Not on file    Years of education: Not on file    Highest education level: Not on file   Occupational History    Occupation:     Tobacco Use    Smoking status: Never    Smokeless tobacco: Never   Vaping Use    Vaping Use: Never used   Substance and Sexual Activity    Alcohol use: No    Drug use: No    Sexual activity: Not on file   Other Topics Concern    Not on file   Social History Narrative    Not on file      Social Determinants of Health          Financial Resource Strain: Low Risk     Difficulty of Paying Living Expenses: Not hard at all   Food Insecurity: No Food Insecurity    Worried About Running Out of Food in the Last Year: Never true    [address] [facility] [address] of Food in the Last Year: Never true   Transportation Needs: Not on file   Physical Activity: Sufficiently Active    Days of Exercise per Week: 7 days    Minutes of Exercise per Session: 60 min   Stress: Not on file   Social Connections: Not on file   Intimate Partner Violence: Not on file   Housing Stability: Not on file            DRUG/FOOD ALLERGIES: Pcn [penicillins]     [address] Medications           Prior to Admission medications    Medication Sig Start Date End Date Taking? Authorizing Provider   mupirocin (BACTROBAN) 2 % ointment Apply topically 3 times daily.  2/23/23     Magda Bynum MD   levothyroxine (SYNTHROID) 50 MCG tablet TAKE 1 TABLET BY MOUTH EVERY DAY AT NIGHT 1/3/23     Alize Fernandez MD   vitamin D 1000 UNITS CAPS Take 2,000 Units by mouth nightly        Historical Provider, MD            Lab Studies:        Lab Results   Component Value Date     WBC 7.7 11/30/2022     HGB 14.4 11/30/2022     HCT 42.7 11/30/2022     MCV 98.8 11/30/2022      11/30/2022            Lab Results   Component Value Date     GLUCOSE 85 submental, submandibular, tonsillar, preauricular, posterior auricular or occipital adenopathy. Left side of head: No submental, submandibular, tonsillar, preauricular or occipital adenopathy. Cervical: No cervical adenopathy. Right cervical: No superficial, deep or posterior cervical adenopathy. Left cervical: No superficial, deep or posterior cervical adenopathy. Skin:     General: Skin is warm and dry. Findings: No lesion. Neurological:      Mental Status: He is alert and oriented to person, place, and time. Psychiatric:         Mood and Affect: Mood is not anxious or depressed. Left tongue lesion:   - Low and high-grade dysplasia with focal area worrisome for invasion. See comment. COMMENT:    Sections show squamous mucosa with low and high-grade   dysplasia. In addition, there is a small focus of atypical squamous   cells in the stroma with associated lymphoid tissue. This focus does not   appear to be connected to the surface in the sections examined. This   small focus extends to the deep inked margin. It is uncertain whether   this represent a small focus of invasion or tangential cut effect. Clinical correlation is recommended. MICHELE/MICHELE            Assessment:     Diagnosis Orders   1. Carcinoma of anterior two-thirds of tongue ([facility] [address]                              Plan:   OR for excisional biopsy tongue, left. The patient has a diagnosis of tongue mass which has not been responsive or cannot be treated with maximal medical therapy. The patient has been advised of options including further medical therapy, surgery, or nothing. The risks and benefits of surgery were described not limited to infection, bleeding, airway and pharyngeal fistula, scars including hypertrophic and keloids, recurrence of disease  and need for further surgical management of disease.   Patient expectations during and after surgery including post-operative care and pain control

## 2023-03-31 NOTE — DISCHARGE INSTRUCTIONS
1. Activity:  No lifting more than 10 lbs, straining, or strenuous activity until cleared by your surgeon. Open mouth with sneezing    2. Diet:   Start with a clear liquid diet after surgery. Advance to your regular diet as tolerated. 3. Incision/wound care:    Do not shower for 48 hours. 4. Pain Management:   Ibuprofen, take three 200 mg tablets by mouth every 6 hours for pain. Tylenol, take two 500 mg tablets every every 6 hours for pain   Oxycodone, 5 mg tablets. One tablet by mouth every 6 hours for breakthrough pain. 5. Follow up:  Please follow up as scheduled below or call [phone] to make an appointment for next week. 6. Other instructions: Rinse mouth with four ounces of hydrogen peroxide mixed with four ounces of water after eating and morning and evening. Please go to the Emergency Room or call our clinic if you experience any of the following:   Worsening pain, nausea, or vomiting not relieved by medications. If you develop weakness in your face or your face is not moving like normal.  Temperature greater than 101.5? F. Redness around incision, drainage from incision, or wound edge separation  Increasing pain. Chest pain, shortness of breath, persistent dizziness, swelling in one or both legs. Severe headache or changes in vision. Our clinic number is: [phone],  please call with any questions or concerns. Do not leave a message on a nurse or surgery scheduler's voicemail. In case of emergency after hours, the doctor can be reached by calling our main number [phone], and you will be directed to our on-call answering service. Discharge Medications:  Ibuprofen (Advil or Motrin)(200mg tablets) 3 tablets by mouth every 6 hours as needed for pain  Tylenol (500mg tablet) 2 tablets by mouth every 6 hours as needed for pain. Oxycodone, 5 mg tablets, 1 tablet by mouth every 6 hours as needed for breakthrough pain. Appointments:  No future appointments. Our clinic number is: [phone]  please call with questions or to confirm, change or make follow up appointments. You may also reach us via the [employer]. [facility]    There are potential side effects of anesthesia or sedation you may experience for the first 24 hours. These side effects include:    Confusion or Memory loss, Dizziness, or Delayed Reaction Times   [x]A responsible person should be with you for the next 24 hours. Do not operate any vehicles (automobiles, bicycles, motorcycles) or power tools or machinery for 24 hours. Do not sign any legal documents or make any legal decisions for 24 hours. Do not drink alcohol for 24 hours or while taking narcotic pain medication. Nausea    [x]Start with light diet and progress to your normal diet as you feel like eating. However, if you experience nausea or repeated episodes of vomiting which persist beyond 12-24 hours, notify your physician. Once nausea has passed, remember to keep drinking fluids. Difficulty Passing Urine  [x]Drink extra amounts of fluid today. Notify your physician if you have not urinated within 8 hours after your procedure or you feel uncomfortable. Irritated Throat from a Breathing Tube  [x]Drink extra amounts of fluid today. Lozenges may help. Muscle Aches  [x]You may experience some generalized body aches as your muscles recover from medications used to relax them during surgery. These will gradually subside. MEDICATION INSTRUCTIONS:  []Prescription(S) x     sent with you. Use as directed. When taking pain medications, you may experience the side effect of dizziness or drowsiness. Do not drink alcohol or drive when taking these medications. []Prescription(S) x          Called to Pharmacy Name and location:    [x]Give the list of your medications to your primary care physician on your next visit.  Keep your med list updated and carry it

## 2023-03-31 NOTE — PROGRESS NOTES
He was planning on staying alone today. States can stay with friend who is the . He said friend will sign responsibility for him at discharge. Dr Shira Hernandez and Dr Nilesh Figueroa made aware of above. Dr Shira Hernandez discussed pain post op with him. He discussed medication and the 1:1 H2O2 swish and swishing after meals.   He said he has peroxide at home

## 2023-03-31 NOTE — ANESTHESIA POSTPROCEDURE EVALUATION
Department of Anesthesiology  Postprocedure Note    Patient: Teresa Washington  MRN: [id number]  YOB: 1950  Date of evaluation: 3/31/2023      Procedure Summary     Date: 03/31/23 Room / Location: [address]    Anesthesia Start: 5494 Anesthesia Stop: 3486    Procedure: EXCISIONAL BIOPSY ANTERIOR THIRD OF LEFT SIDE OF TONGUE (Left) Diagnosis:       Tongue dysplasia      (HIGH GRADE DYSPLASIA, TONGUE)    Surgeons: Chelsie Wiley MD Responsible Provider: Luz Carter MD    Anesthesia Type: general ASA Status: 3          Anesthesia Type: No value filed.     Miky Phase I: Miky Score: 10    Miky Phase II: Miky Score: 10      Anesthesia Post Evaluation    Patient location during evaluation: PACU  Patient participation: complete - patient participated  Level of consciousness: awake and alert  Pain score: 3  Airway patency: patent  Nausea & Vomiting: no nausea and no vomiting  Complications: no  Cardiovascular status: hemodynamically stable  Respiratory status: acceptable  Hydration status: euvolemic

## 2023-03-31 NOTE — PROGRESS NOTES
PACU Transfer to [facility]    Vitals:    03/31/23 0900   BP: (!) 141/86   Pulse: 79   Resp: 12   Temp: 97 °F (36.1 °C)   SpO2: 90%         Intake/Output Summary (Last 24 hours) at 3/31/2023 0021  Last data filed at 3/31/2023 0900  Gross per 24 hour   Intake 150 ml   Output --   Net 150 ml       Pain assessment:  present - adequately treated  Pain Level: 0    Patient transferred to care of JAYA RN.    3/31/2023 9:18 AM

## 2023-03-31 NOTE — PROGRESS NOTES
Pt arrived calm denies pain on admit to pacu report received from CRNA EXCISIONAL BIOPSY ANTERIOR THIRD OF LEFT SIDE OF TONGUE

## 2023-03-31 NOTE — BRIEF OP NOTE
Brief Postoperative Note      Patient: Robby Thomas  YOB: 1950  MRN: [id number]    Date of Procedure: 3/31/2023    Pre-Op Diagnosis: HIGH GRADE DYSPLASIA, TONGUE    Post-Op Diagnosis: Same       Procedure(s):  EXCISIONAL BIOPSY ANTERIOR THIRD OF LEFT SIDE OF TONGUE    Surgeon(s):  Navarro Avendano MD    Assistant:  * No surgical staff found *    Anesthesia: General    Estimated Blood Loss (mL): Minimal    Complications: None    Specimens:   * No specimens in log *    Implants:  * No implants in log *      Drains: * No LDAs found *    Findings: Irregular lesion left mid tongue.      Electronically signed by Saurabh Lara MD on 3/31/2023 at 7:33 AM

## 2023-03-31 NOTE — PROGRESS NOTES
Ambulatory Surgery/Procedure Discharge Note    Vitals:    03/31/23 0917   BP: (!) 150/87   Pulse: 80   Resp: 18   Temp: 97.6 °F (36.4 °C)   SpO2: 95%   BP within 20% of PreOp BP    In: 150 [I.V.:100]  Out: -     Restroom use offered before discharge. Yes (voided in restroom)    Pain assessment:  receiving treatment  Pain Level: 3 (Patient requesting first dose of pain medication, states pain is now starting. Dr. Alexandrea Kaplan notified, order received.)    Patient arrived from PACU to [facility] alert and oriented, respirations easy and non-labored, no distress noted. Incision to left side of tongue is approximated, no redness or swelling noted to tongue or face. Patient denied pain at arrival, but at 0930 reported pain developing on Left side of tongue and radiating to left ear. Patient requested pain medication, and order received from Dr. Alexandrea Kaplan for first dose of oxycodone RX. Patient medicated. Patient denies nausea, tolerating ice water without issue. Patient waiting for oxycodone RX to be filled in outpatient pharmacy prior to discharge. Education completed with patient and friend Moses Tovar. Patient advised that he is to have a responsible adult with him for the next 24 hours, and friend Kay Hobbs stated that patient can stay with him. Christophe to outpatient pharmacy to  RX. Patient to restroom to void and wheeled to car by PCA. Patient discharged to home/self care. Patient discharged via wheel chair by transporter to waiting friend.        3/31/2023 10:00 AM

## 2023-03-31 NOTE — ANESTHESIA PRE PROCEDURE
Department of Anesthesiology  Preprocedure Note       Name:  Kylah Lundberg   Age:  67 y.o.  :  1950                                          MRN:  [id number]         Date:  3/31/2023      Surgeon: Lan Pollack):  Felicia Henley MD    Procedure: Procedure(s):  EXCISIONAL BIOPSY ANTERIOR THIRD OF LEFT SIDE OF TONGUE    Medications prior to admission:   Prior to Admission medications    Medication Sig Start Date End Date Taking? Authorizing Provider   levothyroxine (SYNTHROID) 50 MCG tablet TAKE 1 TABLET BY MOUTH EVERY DAY AT NIGHT 1/3/23   Rosina Castillo MD   vitamin D 1000 UNITS CAPS Take 2,000 Units by mouth nightly     Historical Provider, MD       Current medications:    Current Facility-Administered Medications   Medication Dose Route Frequency Provider Last Rate Last Admin    lactated ringers IV soln infusion   IntraVENous Continuous Nathan Leavitt MD 50 mL/hr at 23 0630 New Bag at 23 0630    clindamycin (CLEOCIN) 600 mg in dextrose 5 % 50 mL IVPB  600 mg IntraVENous Once Felicia Henley MD           Allergies:     Allergies   Allergen Reactions    Pcn [Penicillins] Other (See Comments)     Childhood BLACK TONGUE        Problem List:    Patient Active Problem List   Diagnosis Code    Hypothyroidism E03.9    Vitamin D deficiency E55.9    Heart palpitations R00.2    Myalgia M79.10    Pain of both wrist joints M25.531, M25.532    Paresthesias R20.2    Pes planus of both feet M21.41, M21.42    Pain in joint M25.50    Sicca syndrome ([address] M35.00    Kidney stone N20.0    Hydronephrosis with ureteropelvic junction (UPJ) obstruction Q62.11    Right flank pain R10.9    Elevated blood pressure reading without diagnosis of hypertension R03.0    MI (acute kidney injury) ([address] N17.9    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

## 2023-05-09 ENCOUNTER — OFFICE VISIT (OUTPATIENT)
Dept: ENT CLINIC | Age: 73
End: 2023-05-09

## 2023-05-09 DIAGNOSIS — Z48.89 POSTOPERATIVE VISIT: ICD-10-CM

## 2023-05-09 DIAGNOSIS — C02.3 CARCINOMA OF ANTERIOR TWO-THIRDS OF TONGUE (HCC): Primary | ICD-10-CM

## 2023-05-09 PROCEDURE — 99024 POSTOP FOLLOW-UP VISIT: CPT | Performed by: OTOLARYNGOLOGY

## 2023-05-09 NOTE — PROGRESS NOTES
S/P excisional biopsy left anterior tongue. Doing well. PE: Tongue well healed with no lesions or masses. Also some left turbinate hypertorphy. A/P: Follow up in 6 weeks. Discussed three nights of afrin for nasal obstruciton left. Has tried flonase. Consider coblation if afrin works.

## 2023-06-19 ENCOUNTER — OFFICE VISIT (OUTPATIENT)
Dept: ORTHOPEDIC SURGERY | Age: 73
End: 2023-06-19
Payer: COMMERCIAL

## 2023-06-19 VITALS — BODY MASS INDEX: 30.77 KG/M2 | HEIGHT: 68 IN | WEIGHT: 203 LBS

## 2023-06-19 DIAGNOSIS — M25.551 RIGHT HIP PAIN: Primary | ICD-10-CM

## 2023-06-19 PROCEDURE — 99204 OFFICE O/P NEW MOD 45 MIN: CPT | Performed by: ORTHOPAEDIC SURGERY

## 2023-06-19 PROCEDURE — 1123F ACP DISCUSS/DSCN MKR DOCD: CPT | Performed by: ORTHOPAEDIC SURGERY

## 2023-06-20 ENCOUNTER — OFFICE VISIT (OUTPATIENT)
Dept: ENT CLINIC | Age: 73
End: 2023-06-20

## 2023-06-20 VITALS — HEIGHT: 68 IN | WEIGHT: 207.2 LBS | BODY MASS INDEX: 31.4 KG/M2

## 2023-06-20 DIAGNOSIS — C02.3 CARCINOMA OF ANTERIOR TWO-THIRDS OF TONGUE (HCC): Primary | ICD-10-CM

## 2023-06-20 PROCEDURE — 99024 POSTOP FOLLOW-UP VISIT: CPT | Performed by: OTOLARYNGOLOGY

## 2023-06-20 NOTE — PROGRESS NOTES
S/P partial gloss. Doing well. No complaints    PE: No lesions or masses. No evidence of leukoplakia. A/P: Follow up in 8 weeks.

## 2023-08-01 ENCOUNTER — OFFICE VISIT (OUTPATIENT)
Dept: ENT CLINIC | Age: 73
End: 2023-08-01
Payer: COMMERCIAL

## 2023-08-01 VITALS
HEART RATE: 88 BPM | DIASTOLIC BLOOD PRESSURE: 94 MMHG | BODY MASS INDEX: 32.01 KG/M2 | WEIGHT: 211.2 LBS | TEMPERATURE: 97.7 F | HEIGHT: 68 IN | SYSTOLIC BLOOD PRESSURE: 137 MMHG

## 2023-08-01 DIAGNOSIS — C02.3 CARCINOMA OF ANTERIOR TWO-THIRDS OF TONGUE (HCC): Primary | ICD-10-CM

## 2023-08-01 PROCEDURE — 99212 OFFICE O/P EST SF 10 MIN: CPT | Performed by: OTOLARYNGOLOGY

## 2023-08-01 PROCEDURE — 1123F ACP DISCUSS/DSCN MKR DOCD: CPT | Performed by: OTOLARYNGOLOGY

## 2023-08-01 RX ORDER — DOXYCYCLINE 50 MG/1
50 CAPSULE ORAL 2 TIMES DAILY
COMMUNITY
Start: 2023-07-21

## 2023-08-01 ASSESSMENT — ENCOUNTER SYMPTOMS
NAUSEA: 0
CHOKING: 0
DIARRHEA: 0
SINUS PAIN: 0
FACIAL SWELLING: 0
EYE PAIN: 0
SORE THROAT: 0
SINUS PRESSURE: 0
EYE REDNESS: 0
EYE ITCHING: 0
VOICE CHANGE: 0
TROUBLE SWALLOWING: 0
RHINORRHEA: 0
SHORTNESS OF BREATH: 0
COUGH: 0

## 2023-08-01 NOTE — PROGRESS NOTES
Studies:  Lab Results   Component Value Date    WBC 7.7 11/30/2022    HGB 14.4 11/30/2022    HCT 42.7 11/30/2022    MCV 98.8 11/30/2022     11/30/2022     Lab Results   Component Value Date    GLUCOSE 85 11/30/2022    BUN 17 11/30/2022    CREATININE 0.7 (L) 11/30/2022    K 4.2 11/30/2022     11/30/2022     11/30/2022    CALCIUM 9.2 11/30/2022     Lab Results   Component Value Date    MG 2.20 07/14/2015     No results found for: PHOS  Lab Results   Component Value Date    ALKPHOS 91 11/30/2022    ALT 23 11/30/2022    AST 25 11/30/2022    BILITOT 0.5 11/30/2022    PROT 6.7 11/30/2022        Review of Systems   Constitutional:  Negative for activity change, appetite change, chills, fatigue and fever. HENT:  Negative for congestion, ear discharge, ear pain, facial swelling, hearing loss, nosebleeds, postnasal drip, rhinorrhea, sinus pressure, sinus pain, sneezing, sore throat, tinnitus, trouble swallowing and voice change. Eyes:  Negative for pain, redness, itching and visual disturbance. Respiratory:  Negative for cough, choking and shortness of breath. Gastrointestinal:  Negative for diarrhea and nausea. Endocrine: Negative for cold intolerance and heat intolerance. Objective:   Physical Exam  Constitutional:       General: He is not in acute distress. Appearance: He is well-developed. HENT:      Head: Not macrocephalic and not microcephalic. No abrasion or contusion. Mouth/Throat:      Lips: No lesions. Mouth: No oral lesions. Dentition: Normal dentition. Tongue: No lesions. Palate: No mass. Pharynx: No oropharyngeal exudate, posterior oropharyngeal erythema or uvula swelling. Tonsils: No tonsillar abscesses. Neck:      Thyroid: No thyroid mass or thyromegaly. Trachea: No tracheal tenderness or tracheal deviation. Cardiovascular:      Rate and Rhythm: Normal rate and regular rhythm. Pulmonary:      Breath sounds: No stridor.

## 2023-08-28 ENCOUNTER — OFFICE VISIT (OUTPATIENT)
Dept: INTERNAL MEDICINE CLINIC | Age: 73
End: 2023-08-28
Payer: COMMERCIAL

## 2023-08-28 ENCOUNTER — HOSPITAL ENCOUNTER (OUTPATIENT)
Dept: VASCULAR LAB | Age: 73
Discharge: HOME | End: 2023-08-28
Attending: HOSPITALIST
Payer: COMMERCIAL

## 2023-08-28 VITALS
WEIGHT: 210 LBS | BODY MASS INDEX: 31.93 KG/M2 | HEART RATE: 108 BPM | DIASTOLIC BLOOD PRESSURE: 94 MMHG | SYSTOLIC BLOOD PRESSURE: 154 MMHG | OXYGEN SATURATION: 95 %

## 2023-08-28 DIAGNOSIS — M79.662 PAIN OF LEFT CALF: ICD-10-CM

## 2023-08-28 DIAGNOSIS — M79.662 PAIN OF LEFT CALF: Primary | ICD-10-CM

## 2023-08-28 PROCEDURE — 99213 OFFICE O/P EST LOW 20 MIN: CPT | Performed by: HOSPITALIST

## 2023-08-28 PROCEDURE — 93971 EXTREMITY STUDY: CPT

## 2023-08-28 PROCEDURE — 1123F ACP DISCUSS/DSCN MKR DOCD: CPT | Performed by: HOSPITALIST

## 2023-08-28 ASSESSMENT — PATIENT HEALTH QUESTIONNAIRE - PHQ9
SUM OF ALL RESPONSES TO PHQ QUESTIONS 1-9: 0
SUM OF ALL RESPONSES TO PHQ9 QUESTIONS 1 & 2: 0
2. FEELING DOWN, DEPRESSED OR HOPELESS: 0
1. LITTLE INTEREST OR PLEASURE IN DOING THINGS: 0
SUM OF ALL RESPONSES TO PHQ QUESTIONS 1-9: 0

## 2023-08-28 NOTE — PROGRESS NOTES
Follow Up Visit Established Patient Visit    Patient:  Jeni Jolley                                               : 1950  Age: 75 y.o. MRN: [id number]  Date : 2023    Jeni Jolley is a 75 y.o. male who presents for : Lesion of left upper lateral calf area tenderness    Chief Complaint   Patient presents with    Leg Pain     Lower left extremity, pain, redness at the calf region, pain is on side of leg. Reports left upper lateral calf pain. It started last Friday night. No previous history of venous insufficiency nor lower extremity superficial/deep DVT. The patient does not report any recent long distance travel. Spends long time working on his computer with his left leg flexed and placed under his right thigh area. He reports sharp left calf pain after he gets up. Pain lasts for about 3-5 minutes before it gets better. He even feels significant numbness and weakness in his left leg during that time.     Past Medical History:   Diagnosis Date    Cancer ([address])     skin cancer    Hyperlipidemia     Inguinal hernia bilateral, non-recurrent 2020    Kidney stone 2020    Scalp lesion 2017    squamous cell Ca    Sinus congestion     Thyroid disease     Umbilical hernia without obstruction and without gangrene 2020       Past Surgical History:   Procedure Laterality Date    COLONOSCOPY  2005    COLONOSCOPY  2018    small polyps 5-10 years adenomatousvs hyperplastic    CYSTOSCOPY Right 2020    CYSTOSCOPY, RIGHT RETROGRADE PYLOGRAM, RIGHT URETEROSCOPY, LASER, STONE MANIPULATION AND EXTRACTION AND RIGHT STENT INSERTION performed by Arnulfo Madrigal MD at [facility]    GLOSSECTOMY Left 2021    PARTIAL RESECTION OF LEFT ORAL TONGUE performed by Dafne Sorensen MD at [address] [place] [address]      Dr Rasta Campbell, scalp lesion , squamous cell cancer     MOUTH SURGERY Left 3/19/2021    EXCISE LESION LEFT ORAL TONGUE performed by Dafne Sorensen MD at

## 2023-09-01 ENCOUNTER — TELEPHONE (OUTPATIENT)
Dept: INTERNAL MEDICINE CLINIC | Age: 73
End: 2023-09-01

## 2023-09-01 NOTE — TELEPHONE ENCOUNTER
Gave pt ultrasound results.  He wants to know what he should do next then if he is still having the pain

## 2023-09-05 NOTE — TELEPHONE ENCOUNTER
Spoke with patient already has an appointment with Dr. Barbara Delgadillo 09/20/23 and will keep that appointment

## 2023-09-13 ENCOUNTER — OFFICE VISIT (OUTPATIENT)
Dept: ENT CLINIC | Age: 73
End: 2023-09-13
Payer: COMMERCIAL

## 2023-09-13 VITALS
SYSTOLIC BLOOD PRESSURE: 147 MMHG | DIASTOLIC BLOOD PRESSURE: 83 MMHG | HEIGHT: 68 IN | WEIGHT: 205.2 LBS | BODY MASS INDEX: 31.1 KG/M2 | TEMPERATURE: 97.5 F | HEART RATE: 76 BPM

## 2023-09-13 DIAGNOSIS — C02.3 CARCINOMA OF ANTERIOR TWO-THIRDS OF TONGUE (HCC): Primary | ICD-10-CM

## 2023-09-13 PROCEDURE — 1123F ACP DISCUSS/DSCN MKR DOCD: CPT | Performed by: OTOLARYNGOLOGY

## 2023-09-13 PROCEDURE — 99212 OFFICE O/P EST SF 10 MIN: CPT | Performed by: OTOLARYNGOLOGY

## 2023-09-13 NOTE — PROGRESS NOTES
NIGHT 1/3/23  Yes Uzair Camarillo MD   vitamin D 1000 UNITS CAPS Take 2 capsules by mouth nightly   Yes Historical Provider, MD       Lab Studies:  Lab Results   Component Value Date    WBC 7.7 11/30/2022    HGB 14.4 11/30/2022    HCT 42.7 11/30/2022    MCV 98.8 11/30/2022     11/30/2022     Lab Results   Component Value Date    GLUCOSE 85 11/30/2022    BUN 17 11/30/2022    CREATININE 0.7 (L) 11/30/2022    K 4.2 11/30/2022     11/30/2022     11/30/2022    CALCIUM 9.2 11/30/2022     Lab Results   Component Value Date    MG 2.20 07/14/2015     No results found for: PHOS  Lab Results   Component Value Date    ALKPHOS 91 11/30/2022    ALT 23 11/30/2022    AST 25 11/30/2022    BILITOT 0.5 11/30/2022    PROT 6.7 11/30/2022        Review of Systems   Constitutional:  Negative for activity change, appetite change, chills, fatigue and fever. HENT:  Negative for congestion, ear discharge, ear pain, facial swelling, hearing loss, nosebleeds, postnasal drip, rhinorrhea, sinus pressure, sinus pain, sneezing, sore throat, tinnitus, trouble swallowing and voice change. Eyes:  Negative for pain, redness, itching and visual disturbance. Respiratory:  Negative for cough, choking and shortness of breath. Gastrointestinal:  Negative for diarrhea and nausea. Endocrine: Negative for cold intolerance and heat intolerance. Objective:   Physical Exam  Constitutional:       General: He is not in acute distress. Appearance: He is well-developed. HENT:      Head: Not macrocephalic and not microcephalic. No abrasion or contusion. Mouth/Throat:      Lips: No lesions. Mouth: No oral lesions. Dentition: Normal dentition. Tongue: No lesions. Palate: No mass. Pharynx: No oropharyngeal exudate, posterior oropharyngeal erythema or uvula swelling. Tonsils: No tonsillar abscesses. Neck:      Thyroid: No thyroid mass or thyromegaly.       Trachea: No tracheal tenderness or tracheal

## 2023-09-22 ENCOUNTER — TELEPHONE (OUTPATIENT)
Dept: INTERNAL MEDICINE CLINIC | Age: 73
End: 2023-09-22

## 2023-09-22 NOTE — TELEPHONE ENCOUNTER
Rx Request    levothyroxine (SYNTHROID) 50 MCG tablet    [facility] #[id number]- [place], [place] - [address] [place] [address]. - P [phone] - F [phone]

## 2023-09-25 RX ORDER — LEVOTHYROXINE SODIUM 0.05 MG/1
50 TABLET ORAL NIGHTLY
Qty: 90 TABLET | Refills: 2 | Status: SHIPPED | OUTPATIENT
Start: 2023-09-25

## 2023-09-28 ENCOUNTER — OFFICE VISIT (OUTPATIENT)
Dept: INTERNAL MEDICINE CLINIC | Age: 73
End: 2023-09-28
Payer: COMMERCIAL

## 2023-09-28 VITALS
DIASTOLIC BLOOD PRESSURE: 78 MMHG | BODY MASS INDEX: 31.67 KG/M2 | HEIGHT: 68 IN | WEIGHT: 209 LBS | SYSTOLIC BLOOD PRESSURE: 136 MMHG

## 2023-09-28 DIAGNOSIS — R73.9 HYPERGLYCEMIA: ICD-10-CM

## 2023-09-28 DIAGNOSIS — Z00.00 WELL ADULT EXAM: ICD-10-CM

## 2023-09-28 DIAGNOSIS — E78.5 DYSLIPIDEMIA: ICD-10-CM

## 2023-09-28 DIAGNOSIS — G89.29 CHRONIC RIGHT HIP PAIN: Primary | ICD-10-CM

## 2023-09-28 DIAGNOSIS — E03.9 ACQUIRED HYPOTHYROIDISM: ICD-10-CM

## 2023-09-28 DIAGNOSIS — M25.551 CHRONIC RIGHT HIP PAIN: Primary | ICD-10-CM

## 2023-09-28 DIAGNOSIS — Z12.5 PROSTATE CANCER SCREENING: ICD-10-CM

## 2023-09-28 PROCEDURE — 99213 OFFICE O/P EST LOW 20 MIN: CPT | Performed by: HOSPITALIST

## 2023-09-28 PROCEDURE — 1123F ACP DISCUSS/DSCN MKR DOCD: CPT | Performed by: HOSPITALIST

## 2023-09-28 NOTE — PROGRESS NOTES
Follow Up Visit Established Patient Visit    Patient:  Monica Malagon                                               : 1950  Age: 75 y.o. MRN: [id number]  Date : 2023    Monica Malagon is a 75 y.o. male who presents for : Follow-up appointment    Chief Complaint   Patient presents with    Leg Pain     Left leg, pain seems to be better. Left leg is better. Does not report calf tenderness nor leg swelling. Venous doppler u/s on 23:  No signs of LE DVT    Wears compression socks. Rides his bike for about 5 miles most days of the week ( weather permitting). Had a surgery for leukoplakia removal ( Dr Pasty Koyanagi) on 2023. Last follow-up appointment with Dr. Pasty Koyanagi on 2023  +chronic R hip pain.   Will have a screening colonoscopy in next couple of month ( Dr Valeria Aaron)    Past Medical History:   Diagnosis Date    Cancer [facility])     skin cancer    Hyperlipidemia     Inguinal hernia bilateral, non-recurrent 2020    Kidney stone 2020    Scalp lesion 2017    squamous cell Ca    Sinus congestion     Thyroid disease     Umbilical hernia without obstruction and without gangrene 2020       Past Surgical History:   Procedure Laterality Date    COLONOSCOPY  2005    COLONOSCOPY  2018    small polyps 5-10 years adenomatousvs hyperplastic    CYSTOSCOPY Right 2020    CYSTOSCOPY, RIGHT RETROGRADE PYLOGRAM, RIGHT URETEROSCOPY, LASER, STONE MANIPULATION AND EXTRACTION AND RIGHT STENT INSERTION performed by Allegra Jean-Baptiste MD at [facility]    GLOSSECTOMY Left 2021    PARTIAL RESECTION OF LEFT ORAL TONGUE performed by Pablo Finney MD at [facility]      Dr Hattie Balderas, scalp lesion , squamous cell cancer     MOUTH SURGERY Left 3/19/2021    EXCISE LESION LEFT ORAL TONGUE performed by Pablo Finney MD at [facility] Left 3/31/2023    EXCISIONAL BIOPSY ANTERIOR THIRD OF LEFT SIDE OF TONGUE performed by Osmin Hardy MD

## 2023-10-25 ENCOUNTER — OFFICE VISIT (OUTPATIENT)
Dept: ENT CLINIC | Age: 73
End: 2023-10-25
Payer: COMMERCIAL

## 2023-10-25 VITALS
DIASTOLIC BLOOD PRESSURE: 90 MMHG | HEART RATE: 91 BPM | BODY MASS INDEX: 31.52 KG/M2 | HEIGHT: 68 IN | WEIGHT: 208 LBS | TEMPERATURE: 97.5 F | SYSTOLIC BLOOD PRESSURE: 144 MMHG

## 2023-10-25 DIAGNOSIS — C02.3 CARCINOMA OF ANTERIOR TWO-THIRDS OF TONGUE (HCC): Primary | ICD-10-CM

## 2023-10-25 PROCEDURE — 99212 OFFICE O/P EST SF 10 MIN: CPT | Performed by: OTOLARYNGOLOGY

## 2023-10-25 PROCEDURE — 1123F ACP DISCUSS/DSCN MKR DOCD: CPT | Performed by: OTOLARYNGOLOGY

## 2023-10-25 RX ORDER — VARENICLINE 0.03 MG/.05ML
SPRAY NASAL
COMMUNITY
Start: 2023-10-04

## 2023-10-25 NOTE — PROGRESS NOTES
Subjective:      Patient ID: Janae Ignacio is a 75 y.o. male. HPI  Chief Complaint   Patient presents with    surveillance       History of Present Illness  Head/Neck    Donn Andrews is a(n) 75 y.o. male who presents with a 6 week follow up tongue cancer. Doing well. Some diminish of taste. No pain or bleeding. No palpable lump on tongue  Otalgia: No  Odynophagia: No  Dysphagia: No  Voice Changes: No  Lumps in neck: No    10-25-23: Patient here for 6 weeks follow up. No interval change. Surgery 3-31-23. No complaints of tongue. Some left sided nasal congestion.        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    Tongue dysplasia    History of malignant neoplasm of tongue    Carcinoma of anterior two-thirds of tongue [facility])     Past Surgical History:   Procedure Laterality Date    COLONOSCOPY  04/04/2005    COLONOSCOPY  01/05/2018    small polyps 5-10 years adenomatousvs hyperplastic    CYSTOSCOPY Right 05/14/2020    CYSTOSCOPY, RIGHT RETROGRADE PYLOGRAM, RIGHT URETEROSCOPY, LASER, STONE MANIPULATION AND EXTRACTION AND RIGHT STENT INSERTION performed by Joey Linares MD at [facility]    GLOSSECTOMY Left 9/30/2021    PARTIAL RESECTION OF LEFT ORAL TONGUE performed by Gurjit Mathew MD at [address]  2016    Dr Elvie Javier, scalp lesion , squamous cell cancer     MOUTH SURGERY Left 3/19/2021    EXCISE LESION LEFT ORAL TONGUE performed by Gurjit Mathew MD at [facility] Left 3/31/2023    EXCISIONAL BIOPSY ANTERIOR THIRD OF LEFT SIDE OF

## 2023-12-04 ENCOUNTER — OFFICE VISIT (OUTPATIENT)
Dept: ENT CLINIC | Age: 73
End: 2023-12-04
Payer: COMMERCIAL

## 2023-12-04 VITALS
BODY MASS INDEX: 32.01 KG/M2 | TEMPERATURE: 98.6 F | WEIGHT: 211.2 LBS | HEIGHT: 68 IN | HEART RATE: 86 BPM | SYSTOLIC BLOOD PRESSURE: 142 MMHG | DIASTOLIC BLOOD PRESSURE: 81 MMHG

## 2023-12-04 DIAGNOSIS — C02.3 CARCINOMA OF ANTERIOR TWO-THIRDS OF TONGUE (HCC): Primary | ICD-10-CM

## 2023-12-04 PROCEDURE — 1123F ACP DISCUSS/DSCN MKR DOCD: CPT | Performed by: OTOLARYNGOLOGY

## 2023-12-04 PROCEDURE — 99212 OFFICE O/P EST SF 10 MIN: CPT | Performed by: OTOLARYNGOLOGY

## 2023-12-04 NOTE — PROGRESS NOTES
DAY AT NIGHT 1/3/23  Yes Martin Celis MD   vitamin D 1000 UNITS CAPS Take 2 capsules by mouth nightly   Yes Historical Provider, MD       Lab Studies:  Lab Results   Component Value Date    WBC 7.7 11/30/2022    HGB 14.4 11/30/2022    HCT 42.7 11/30/2022    MCV 98.8 11/30/2022     11/30/2022     Lab Results   Component Value Date    GLUCOSE 85 11/30/2022    BUN 17 11/30/2022    CREATININE 0.7 (L) 11/30/2022    K 4.2 11/30/2022     11/30/2022     11/30/2022    CALCIUM 9.2 11/30/2022     Lab Results   Component Value Date    MG 2.20 07/14/2015     No results found for: PHOS  Lab Results   Component Value Date    ALKPHOS 91 11/30/2022    ALT 23 11/30/2022    AST 25 11/30/2022    BILITOT 0.5 11/30/2022    PROT 6.7 11/30/2022        Review of Systems   Constitutional:  Negative for activity change, appetite change, chills, fatigue and fever. HENT:  Negative for congestion, ear discharge, ear pain, facial swelling, hearing loss, nosebleeds, postnasal drip, rhinorrhea, sinus pressure, sinus pain, sneezing, sore throat, tinnitus, trouble swallowing and voice change. Eyes:  Negative for pain, redness, itching and visual disturbance. Respiratory:  Negative for cough, choking and shortness of breath. Gastrointestinal:  Negative for diarrhea and nausea. Endocrine: Negative for cold intolerance and heat intolerance. Objective:   Physical Exam  Constitutional:       General: He is not in acute distress. Appearance: He is well-developed. HENT:      Head: Not macrocephalic and not microcephalic. No abrasion or contusion. Mouth/Throat:      Lips: No lesions. Mouth: No oral lesions. Dentition: Normal dentition. Tongue: No lesions. Palate: No mass. Pharynx: No oropharyngeal exudate, posterior oropharyngeal erythema or uvula swelling. Tonsils: No tonsillar abscesses. Neck:      Thyroid: No thyroid mass or thyromegaly.       Trachea: No tracheal tenderness or

## 2023-12-06 ENCOUNTER — TELEPHONE (OUTPATIENT)
Dept: INTERNAL MEDICINE CLINIC | Age: 73
End: 2023-12-06

## 2023-12-06 NOTE — TELEPHONE ENCOUNTER
----- Message from Luis Cha sent at 12/6/2023 12:23 PM EST -----  Subject: Appointment Request    Reason for Call: Established Patient Appointment needed: Routine Pre-Op    QUESTIONS    Reason for appointment request? Available appointments did not meet   patient need     Additional Information for Provider? Pt is needing to schedule a pre-op   appt for right hip replacement surgery on 12/14/23 with Dr. Courtney Pedraza Surgery at [facility]. First available appt is   after surgery date.  Please call pt to schedule.  ---------------------------------------------------------------------------  --------------  Jaymie RILEY  [phone]; OK to leave message on voicemail  ---------------------------------------------------------------------------  --------------  SCRIPT ANSWERS

## 2023-12-07 NOTE — TELEPHONE ENCOUNTER
Patient called in stating that he needs to be seen by the 11th because his paperwork has to be filled out by then.

## 2023-12-08 ENCOUNTER — OFFICE VISIT (OUTPATIENT)
Dept: INTERNAL MEDICINE CLINIC | Age: 73
End: 2023-12-08
Payer: COMMERCIAL

## 2023-12-08 VITALS
DIASTOLIC BLOOD PRESSURE: 78 MMHG | BODY MASS INDEX: 31.83 KG/M2 | SYSTOLIC BLOOD PRESSURE: 150 MMHG | HEART RATE: 103 BPM | TEMPERATURE: 98.6 F | WEIGHT: 210 LBS | HEIGHT: 68 IN | OXYGEN SATURATION: 98 %

## 2023-12-08 DIAGNOSIS — E78.5 DYSLIPIDEMIA: ICD-10-CM

## 2023-12-08 DIAGNOSIS — R73.9 HYPERGLYCEMIA: ICD-10-CM

## 2023-12-08 DIAGNOSIS — G89.29 CHRONIC RIGHT HIP PAIN: ICD-10-CM

## 2023-12-08 DIAGNOSIS — E03.9 ACQUIRED HYPOTHYROIDISM: ICD-10-CM

## 2023-12-08 DIAGNOSIS — R03.0 ELEVATED BP WITHOUT DIAGNOSIS OF HYPERTENSION: ICD-10-CM

## 2023-12-08 DIAGNOSIS — Z01.818 PRE-OP EXAM: Primary | ICD-10-CM

## 2023-12-08 DIAGNOSIS — M25.551 CHRONIC RIGHT HIP PAIN: ICD-10-CM

## 2023-12-08 PROCEDURE — 85610 PROTHROMBIN TIME: CPT | Performed by: HOSPITALIST

## 2023-12-08 PROCEDURE — 93000 ELECTROCARDIOGRAM COMPLETE: CPT | Performed by: HOSPITALIST

## 2023-12-08 PROCEDURE — 1123F ACP DISCUSS/DSCN MKR DOCD: CPT | Performed by: HOSPITALIST

## 2023-12-08 PROCEDURE — 99214 OFFICE O/P EST MOD 30 MIN: CPT | Performed by: HOSPITALIST

## 2023-12-08 ASSESSMENT — ENCOUNTER SYMPTOMS
RESPIRATORY NEGATIVE: 1
EYES NEGATIVE: 1
GASTROINTESTINAL NEGATIVE: 1
ALLERGIC/IMMUNOLOGIC NEGATIVE: 1
BACK PAIN: 1
SINUS PRESSURE: 1

## 2023-12-08 NOTE — PROGRESS NOTES
3.2 oz)   10/25/23 94.3 kg (208 lb)     Physical Exam  Vitals and nursing note reviewed. Constitutional:       General: He is not in acute distress. Appearance: Normal appearance. He is well-developed. HENT:      Head: Normocephalic and atraumatic. Right Ear: Tympanic membrane, ear canal and external ear normal. There is no impacted cerumen. Left Ear: Tympanic membrane, ear canal and external ear normal. There is no impacted cerumen. Nose:      Comments: Nasal mucosa is moderately swollen and mildly inflamed     Mouth/Throat:      Mouth: Mucous membranes are moist.      Pharynx: Oropharynx is clear. No oropharyngeal exudate or posterior oropharyngeal erythema. Eyes:      General: No scleral icterus. Pupils: Pupils are equal, round, and reactive to light. Neck:      Vascular: No carotid bruit or JVD. Cardiovascular:      Rate and Rhythm: Normal rate and regular rhythm. Heart sounds: Normal heart sounds. No murmur heard. No friction rub. No gallop. Pulmonary:      Effort: Pulmonary effort is normal. No respiratory distress. Breath sounds: Normal breath sounds. No wheezing or rales. Abdominal:      General: Bowel sounds are normal. There is no distension. Palpations: Abdomen is soft. Tenderness: There is no abdominal tenderness. There is no right CVA tenderness or left CVA tenderness. Musculoskeletal:         General: No tenderness. Normal range of motion. Cervical back: Normal range of motion and neck supple. Right lower leg: No edema. Left lower leg: No edema. Lymphadenopathy:      Cervical: No cervical adenopathy. Skin:     General: Skin is warm and dry. Findings: No erythema, lesion or rash. Neurological:      Mental Status: He is alert and oriented to person, place, and time. Cranial Nerves: No cranial nerve deficit.       Gait: Gait normal.      Deep Tendon Reflexes: Reflexes normal.         EKG: Sinus rhythm; low voltage

## 2023-12-09 DIAGNOSIS — Z01.818 PRE-OP EXAM: ICD-10-CM

## 2023-12-09 DIAGNOSIS — R03.0 ELEVATED BP WITHOUT DIAGNOSIS OF HYPERTENSION: ICD-10-CM

## 2023-12-09 DIAGNOSIS — R73.9 HYPERGLYCEMIA: ICD-10-CM

## 2023-12-09 DIAGNOSIS — G89.29 CHRONIC RIGHT HIP PAIN: ICD-10-CM

## 2023-12-09 DIAGNOSIS — E78.5 DYSLIPIDEMIA: ICD-10-CM

## 2023-12-09 DIAGNOSIS — E03.9 ACQUIRED HYPOTHYROIDISM: ICD-10-CM

## 2023-12-09 DIAGNOSIS — M25.551 CHRONIC RIGHT HIP PAIN: ICD-10-CM

## 2023-12-09 LAB
25(OH)D3 SERPL-MCNC: 34.3 NG/ML
ALBUMIN SERPL-MCNC: 4.7 G/DL (ref 3.4–5)
ALBUMIN/GLOB SERPL: 2.2 {RATIO} (ref 1.1–2.2)
ALP SERPL-CCNC: 75 U/L (ref 40–129)
ALT SERPL-CCNC: 26 U/L (ref 10–40)
ANION GAP SERPL CALCULATED.3IONS-SCNC: 12 MMOL/L (ref 3–16)
APTT BLD: 31.3 SEC (ref 22.7–35.9)
AST SERPL-CCNC: 23 U/L (ref 15–37)
BASOPHILS # BLD: 0.1 K/UL (ref 0–0.2)
BASOPHILS NFR BLD: 0.9 %
BILIRUB SERPL-MCNC: 0.6 MG/DL (ref 0–1)
BUN SERPL-MCNC: 22 MG/DL (ref 7–20)
CALCIUM SERPL-MCNC: 8.6 MG/DL (ref 8.3–10.6)
CHLORIDE SERPL-SCNC: 106 MMOL/L (ref 99–110)
CHOLEST SERPL-MCNC: 155 MG/DL (ref 0–199)
CO2 SERPL-SCNC: 24 MMOL/L (ref 21–32)
CREAT SERPL-MCNC: 1 MG/DL (ref 0.8–1.3)
DEPRECATED RDW RBC AUTO: 14.7 % (ref 12.4–15.4)
EOSINOPHIL # BLD: 0.1 K/UL (ref 0–0.6)
EOSINOPHIL NFR BLD: 1 %
GFR SERPLBLD CREATININE-BSD FMLA CKD-EPI: >60 ML/MIN/{1.73_M2}
GLUCOSE SERPL-MCNC: 115 MG/DL (ref 70–99)
HCT VFR BLD AUTO: 41.6 % (ref 40.5–52.5)
HDLC SERPL-MCNC: 43 MG/DL (ref 40–60)
HGB BLD-MCNC: 14.2 G/DL (ref 13.5–17.5)
LDLC SERPL CALC-MCNC: 69 MG/DL
LYMPHOCYTES # BLD: 2.4 K/UL (ref 1–5.1)
LYMPHOCYTES NFR BLD: 36.2 %
MCH RBC QN AUTO: 34 PG (ref 26–34)
MCHC RBC AUTO-ENTMCNC: 34 G/DL (ref 31–36)
MCV RBC AUTO: 100 FL (ref 80–100)
MONOCYTES # BLD: 0.5 K/UL (ref 0–1.3)
MONOCYTES NFR BLD: 8.2 %
NEUTROPHILS # BLD: 3.6 K/UL (ref 1.7–7.7)
NEUTROPHILS NFR BLD: 53.7 %
PLATELET # BLD AUTO: 165 K/UL (ref 135–450)
PMV BLD AUTO: 8.4 FL (ref 5–10.5)
POTASSIUM SERPL-SCNC: 4.3 MMOL/L (ref 3.5–5.1)
PROT SERPL-MCNC: 6.8 G/DL (ref 6.4–8.2)
RBC # BLD AUTO: 4.17 M/UL (ref 4.2–5.9)
SODIUM SERPL-SCNC: 142 MMOL/L (ref 136–145)
TRIGL SERPL-MCNC: 213 MG/DL (ref 0–150)
TSH SERPL DL<=0.005 MIU/L-ACNC: 4.76 UIU/ML (ref 0.27–4.2)
VLDLC SERPL CALC-MCNC: 43 MG/DL
WBC # BLD AUTO: 6.6 K/UL (ref 4–11)

## 2023-12-10 DIAGNOSIS — E03.9 ACQUIRED HYPOTHYROIDISM: Primary | ICD-10-CM

## 2023-12-10 LAB
EST. AVERAGE GLUCOSE BLD GHB EST-MCNC: 108.3 MG/DL
HBA1C MFR BLD: 5.4 %

## 2023-12-10 RX ORDER — LEVOTHYROXINE SODIUM 0.07 MG/1
75 TABLET ORAL NIGHTLY
Qty: 30 TABLET | Refills: 2 | Status: SHIPPED | OUTPATIENT
Start: 2023-12-10

## 2023-12-11 LAB — FRUCTOSAMINE SERPL-SCNC: 230 UMOL/L (ref 205–285)

## 2023-12-12 ENCOUNTER — TELEPHONE (OUTPATIENT)
Dept: INTERNAL MEDICINE CLINIC | Age: 73
End: 2023-12-12

## 2023-12-12 DIAGNOSIS — Z01.818 PRE-OP EXAM: Primary | ICD-10-CM

## 2023-12-12 DIAGNOSIS — Z01.818 PRE-OP EXAM: ICD-10-CM

## 2023-12-12 LAB
INR PPP: 1.03 (ref 0.84–1.16)
PROTHROMBIN TIME: 13.5 SEC (ref 11.5–14.8)

## 2023-12-12 NOTE — TELEPHONE ENCOUNTER
Left message to call back   Needs protime/inr ordered for patient, she will call patient and have him go to the lab

## 2023-12-12 NOTE — TELEPHONE ENCOUNTER
Heydi from [facility] is calling with questions in regards to a PT INR lab.     Please call and advise   Ph. [phone]

## 2024-01-15 ENCOUNTER — OFFICE VISIT (OUTPATIENT)
Dept: ENT CLINIC | Age: 74
End: 2024-01-15
Payer: COMMERCIAL

## 2024-01-15 VITALS
BODY MASS INDEX: 31.34 KG/M2 | SYSTOLIC BLOOD PRESSURE: 140 MMHG | WEIGHT: 206.8 LBS | TEMPERATURE: 99.8 F | DIASTOLIC BLOOD PRESSURE: 77 MMHG | HEART RATE: 94 BPM | HEIGHT: 68 IN

## 2024-01-15 DIAGNOSIS — C02.3 CARCINOMA OF ANTERIOR TWO-THIRDS OF TONGUE (HCC): Primary | ICD-10-CM

## 2024-01-15 PROCEDURE — 1123F ACP DISCUSS/DSCN MKR DOCD: CPT | Performed by: OTOLARYNGOLOGY

## 2024-01-15 PROCEDURE — 99212 OFFICE O/P EST SF 10 MIN: CPT | Performed by: OTOLARYNGOLOGY

## 2024-01-15 NOTE — PROGRESS NOTES
Subjective:      Patient ID: Atilio Oconnor is a 73 y.o. male.    HPI  Chief Complaint   Patient presents with    surveillance       History of Present Illness  Head/Neck    Atilio is a(n) 73 y.o. male who presents with a 6 week follow up tongue cancer. Doing well. Some diminish of taste. No pain or bleeding. No palpable lump on tongue  Otalgia: No  Odynophagia: No  Dysphagia: No  Voice Changes: No  Lumps in neck: No    10-25-23: Patient here for 6 weeks follow up. No interval change. Surgery 3-31-23. No complaints of tongue. Some left sided nasal congestion.   1-15-24: No tongue complaints.       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)     Past Surgical History:   Procedure Laterality Date    COLONOSCOPY  04/04/2005    COLONOSCOPY  01/05/2018    small polyps 5-10 years adenomatousvs hyperplastic    CYSTOSCOPY Right 05/14/2020    CYSTOSCOPY, RIGHT RETROGRADE PYLOGRAM, RIGHT URETEROSCOPY, LASER, STONE MANIPULATION AND EXTRACTION AND RIGHT STENT INSERTION performed by Mina Gregory MD at [facility] OR    GLOSSECTOMY Left 9/30/2021    PARTIAL RESECTION OF LEFT ORAL TONGUE performed by Joe Forbes MD at [facility] OR    MOHS SURGERY  2016    Dr Love, scalp lesion , squamous cell cancer     MOUTH SURGERY Left 3/19/2021    EXCISE LESION LEFT ORAL TONGUE performed by Joe Forbes MD at [facility] OR    MOUTH SURGERY Left 3/31/2023    EXCISIONAL BIOPSY

## 2024-02-29 DIAGNOSIS — E03.9 ACQUIRED HYPOTHYROIDISM: ICD-10-CM

## 2024-03-01 RX ORDER — LEVOTHYROXINE SODIUM 0.07 MG/1
75 TABLET ORAL NIGHTLY
Qty: 90 TABLET | Refills: 1 | Status: SHIPPED | OUTPATIENT
Start: 2024-03-01

## 2024-03-04 ENCOUNTER — OFFICE VISIT (OUTPATIENT)
Dept: ENT CLINIC | Age: 74
End: 2024-03-04
Payer: COMMERCIAL

## 2024-03-04 VITALS
HEART RATE: 83 BPM | BODY MASS INDEX: 31.83 KG/M2 | TEMPERATURE: 97.8 F | WEIGHT: 210 LBS | HEIGHT: 68 IN | RESPIRATION RATE: 16 BRPM | SYSTOLIC BLOOD PRESSURE: 127 MMHG | DIASTOLIC BLOOD PRESSURE: 87 MMHG

## 2024-03-04 DIAGNOSIS — C02.3 CARCINOMA OF ANTERIOR TWO-THIRDS OF TONGUE (HCC): Primary | ICD-10-CM

## 2024-03-04 PROCEDURE — 99212 OFFICE O/P EST SF 10 MIN: CPT | Performed by: OTOLARYNGOLOGY

## 2024-03-04 PROCEDURE — 1123F ACP DISCUSS/DSCN MKR DOCD: CPT | Performed by: OTOLARYNGOLOGY

## 2024-03-04 NOTE — PROGRESS NOTES
3/31/2023    EXCISIONAL BIOPSY ANTERIOR THIRD OF LEFT SIDE OF TONGUE performed by Gasper Cavazos MD at [facility] OR    SINUS SURGERY      TONSILLECTOMY       Family History   Problem Relation Age of Onset    No Known Problems Mother     No Known Problems Father      Social History     Socioeconomic History    Marital status: Single     Spouse name: Not on file    Number of children: Not on file    Years of education: Not on file    Highest education level: Not on file   Occupational History    Occupation:     Tobacco Use    Smoking status: Never    Smokeless tobacco: Never   Vaping Use    Vaping Use: Never used   Substance and Sexual Activity    Alcohol use: No    Drug use: No    Sexual activity: Not on file   Other Topics Concern    Not on file   Social History Narrative    Not on file     Social Determinants of Health     Financial Resource Strain: Unknown    Difficulty of Paying Living Expenses: Patient refused   Food Insecurity: Unknown    Worried About Running Out of Food in the Last Year: Patient refused    Ran Out of Food in the Last Year: Patient refused   Transportation Needs: Unknown    Lack of Transportation (Medical): Not on file    Lack of Transportation (Non-Medical): Patient refused   Physical Activity: Sufficiently Active    Days of Exercise per Week: 7 days    Minutes of Exercise per Session: 60 min   Stress: Not on file   Social Connections: Not on file   Intimate Partner Violence: Not on file   Housing Stability: Unknown    Unable to Pay for Housing in the Last Year: Not on file    Number of Places Lived in the Last Year: Not on file    Unstable Housing in the Last Year: Patient refused       DRUG/FOOD ALLERGIES: Pcn [penicillins]    CURRENT MEDICATIONS  Prior to Admission medications    Medication Sig Start Date End Date Taking? Authorizing Provider   doxycycline monohydrate (MONODOX) 50 MG capsule Take 1 capsule by mouth 2 times daily 7/21/23  Yes Historical Provider, MD

## 2024-04-15 ENCOUNTER — OFFICE VISIT (OUTPATIENT)
Dept: ENT CLINIC | Age: 74
End: 2024-04-15
Payer: COMMERCIAL

## 2024-04-15 VITALS
SYSTOLIC BLOOD PRESSURE: 145 MMHG | HEIGHT: 68 IN | TEMPERATURE: 98.2 F | HEART RATE: 89 BPM | BODY MASS INDEX: 32.04 KG/M2 | DIASTOLIC BLOOD PRESSURE: 87 MMHG | WEIGHT: 211.4 LBS

## 2024-04-15 DIAGNOSIS — C02.3 CARCINOMA OF ANTERIOR TWO-THIRDS OF TONGUE (HCC): Primary | ICD-10-CM

## 2024-04-15 PROCEDURE — 1123F ACP DISCUSS/DSCN MKR DOCD: CPT | Performed by: OTOLARYNGOLOGY

## 2024-04-15 PROCEDURE — 99212 OFFICE O/P EST SF 10 MIN: CPT | Performed by: OTOLARYNGOLOGY

## 2024-04-15 NOTE — PROGRESS NOTES
Subjective:      Patient ID: Atilio Oconnor is a 73 y.o. male.    HPI  Chief Complaint   Patient presents with    surveillance       History of Present Illness  Head/Neck    Atilio is a(n) 73 y.o. male who presents with a 6 week follow up tongue cancer. Doing well. Some diminish of taste. No pain or bleeding. No palpable lump on tongue  Otalgia: No  Odynophagia: No  Dysphagia: No  Voice Changes: No  Lumps in neck: No    10-25-23: Patient here for 6 weeks follow up. No interval change. Surgery 3-31-23. No complaints of tongue. Some left sided nasal congestion.   1-15-24: No tongue complaints.   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)     Past Surgical History:   Procedure Laterality Date    COLONOSCOPY  04/04/2005    COLONOSCOPY  01/05/2018    small polyps 5-10 years adenomatousvs hyperplastic    CYSTOSCOPY Right 05/14/2020    CYSTOSCOPY, RIGHT RETROGRADE PYLOGRAM, RIGHT URETEROSCOPY, LASER, STONE MANIPULATION AND EXTRACTION AND RIGHT STENT INSERTION performed by Mina Gregory MD at [facility] OR    GLOSSECTOMY Left 9/30/2021    PARTIAL RESECTION OF LEFT ORAL TONGUE performed by Joe Forbes MD at [facility] OR    MOHS SURGERY  2016    Dr Love, scalp lesion , squamous cell cancer     MOUTH SURGERY Left 3/19/2021    EXCISE LESION LEFT ORAL TONGUE performed by Joe Forbes MD at

## 2024-05-30 ENCOUNTER — OFFICE VISIT (OUTPATIENT)
Dept: ENT CLINIC | Age: 74
End: 2024-05-30
Payer: COMMERCIAL

## 2024-05-30 VITALS — TEMPERATURE: 98 F | HEART RATE: 76 BPM | SYSTOLIC BLOOD PRESSURE: 151 MMHG | DIASTOLIC BLOOD PRESSURE: 88 MMHG

## 2024-05-30 DIAGNOSIS — Z85.810 H/O TONGUE CANCER: Primary | ICD-10-CM

## 2024-05-30 PROCEDURE — 99212 OFFICE O/P EST SF 10 MIN: CPT | Performed by: OTOLARYNGOLOGY

## 2024-05-30 PROCEDURE — 1123F ACP DISCUSS/DSCN MKR DOCD: CPT | Performed by: OTOLARYNGOLOGY

## 2024-05-30 NOTE — PROGRESS NOTES
Subjective:      Patient ID: Atilio Oconnor is a 73 y.o. male.    HPI  Chief Complaint   Patient presents with    surveillance       History of Present Illness  Head/Neck    Atilio is a(n) 73 y.o. male who presents with a 6 week follow up tongue cancer. Doing well. Some diminish of taste. No pain or bleeding. No palpable lump on tongue  Otalgia: No  Odynophagia: No  Dysphagia: No  Voice Changes: No  Lumps in neck: No    10-25-23: Patient here for 6 weeks follow up. No interval change. Surgery 3-31-23. No complaints of tongue. Some left sided nasal congestion.   1-15-24: No tongue complaints.   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)     Past Surgical History:   Procedure Laterality Date    COLONOSCOPY  04/04/2005    COLONOSCOPY  01/05/2018    small polyps 5-10 years adenomatousvs hyperplastic    CYSTOSCOPY Right 05/14/2020    CYSTOSCOPY, RIGHT RETROGRADE PYLOGRAM, RIGHT URETEROSCOPY, LASER, STONE MANIPULATION AND EXTRACTION AND RIGHT STENT INSERTION performed by Mina Gregory MD at [facility] OR    GLOSSECTOMY Left 9/30/2021    PARTIAL RESECTION OF LEFT ORAL TONGUE performed by Joe Forbes MD at [facility] OR    MOHS SURGERY  2016    Dr Love, scalp lesion , squamous cell cancer     MOUTH SURGERY Left 3/19/2021    EXCISE LESION LEFT ORAL TONGUE performed

## 2024-06-04 ENCOUNTER — OFFICE VISIT (OUTPATIENT)
Dept: INTERNAL MEDICINE CLINIC | Age: 74
End: 2024-06-04
Payer: COMMERCIAL

## 2024-06-04 VITALS
SYSTOLIC BLOOD PRESSURE: 186 MMHG | HEART RATE: 84 BPM | DIASTOLIC BLOOD PRESSURE: 102 MMHG | OXYGEN SATURATION: 96 % | WEIGHT: 211.4 LBS | TEMPERATURE: 98.2 F | BODY MASS INDEX: 32.14 KG/M2

## 2024-06-04 DIAGNOSIS — I10 HYPERTENSION, UNSPECIFIED TYPE: Primary | ICD-10-CM

## 2024-06-04 DIAGNOSIS — M79.662 PAIN OF LEFT CALF: ICD-10-CM

## 2024-06-04 PROCEDURE — 1123F ACP DISCUSS/DSCN MKR DOCD: CPT | Performed by: STUDENT IN AN ORGANIZED HEALTH CARE EDUCATION/TRAINING PROGRAM

## 2024-06-04 PROCEDURE — 99214 OFFICE O/P EST MOD 30 MIN: CPT | Performed by: STUDENT IN AN ORGANIZED HEALTH CARE EDUCATION/TRAINING PROGRAM

## 2024-06-04 PROCEDURE — 3077F SYST BP >= 140 MM HG: CPT | Performed by: STUDENT IN AN ORGANIZED HEALTH CARE EDUCATION/TRAINING PROGRAM

## 2024-06-04 PROCEDURE — 3080F DIAST BP >= 90 MM HG: CPT | Performed by: STUDENT IN AN ORGANIZED HEALTH CARE EDUCATION/TRAINING PROGRAM

## 2024-06-04 RX ORDER — AMLODIPINE BESYLATE 5 MG/1
5 TABLET ORAL DAILY
Qty: 30 TABLET | Refills: 0 | Status: SHIPPED | OUTPATIENT
Start: 2024-06-04

## 2024-06-04 RX ORDER — SULFACETAMIDE SODIUM 100 MG/ML
1 SOLUTION/ DROPS OPHTHALMIC 4 TIMES DAILY PRN
COMMUNITY
Start: 2024-05-15

## 2024-06-04 SDOH — ECONOMIC STABILITY: INCOME INSECURITY: HOW HARD IS IT FOR YOU TO PAY FOR THE VERY BASICS LIKE FOOD, HOUSING, MEDICAL CARE, AND HEATING?: NOT HARD AT ALL

## 2024-06-04 SDOH — ECONOMIC STABILITY: FOOD INSECURITY: WITHIN THE PAST 12 MONTHS, THE FOOD YOU BOUGHT JUST DIDN'T LAST AND YOU DIDN'T HAVE MONEY TO GET MORE.: NEVER TRUE

## 2024-06-04 SDOH — ECONOMIC STABILITY: HOUSING INSECURITY
IN THE LAST 12 MONTHS, WAS THERE A TIME WHEN YOU DID NOT HAVE A STEADY PLACE TO SLEEP OR SLEPT IN A SHELTER (INCLUDING NOW)?: NO

## 2024-06-04 SDOH — ECONOMIC STABILITY: FOOD INSECURITY: WITHIN THE PAST 12 MONTHS, YOU WORRIED THAT YOUR FOOD WOULD RUN OUT BEFORE YOU GOT MONEY TO BUY MORE.: NEVER TRUE

## 2024-06-04 ASSESSMENT — PATIENT HEALTH QUESTIONNAIRE - PHQ9
SUM OF ALL RESPONSES TO PHQ9 QUESTIONS 1 & 2: 0
SUM OF ALL RESPONSES TO PHQ QUESTIONS 1-9: 0
1. LITTLE INTEREST OR PLEASURE IN DOING THINGS: NOT AT ALL
2. FEELING DOWN, DEPRESSED OR HOPELESS: NOT AT ALL
SUM OF ALL RESPONSES TO PHQ QUESTIONS 1-9: 0

## 2024-06-04 NOTE — ASSESSMENT & PLAN NOTE
Pain in L lateral calf x 1 week. Patient had the same pain 1 year ago, which improved with better seated posture. He notably sits with leg tucked under himself. No swelling or tenderness. Pain spans small area of lateral calf.  - stay seated with both legs on floor  - tylenol, ibuprofen as needed  - f/u in 2 weeks

## 2024-06-04 NOTE — ASSESSMENT & PLAN NOTE
Notably elevated BP on multiple checks during today's visit.   - start amlodipine 5mg daily  - patient to f/u with BP log in 7 days   - will f/u in office in 2 weeks

## 2024-06-04 NOTE — PROGRESS NOTES
status is at baseline.      Sensory: No sensory deficit.      Motor: No weakness.      Gait: Gait normal.   Psychiatric:         Mood and Affect: Mood normal.         ASSESSMENT/PLAN:    Hypertension  Notably elevated BP on multiple checks during today's visit.   - start amlodipine 5mg daily  - patient to f/u with BP log in 7 days   - will f/u in office in 2 weeks    Pain of left calf   Pain in L lateral calf x 1 week. Patient had the same pain 1 year ago, which improved with better seated posture. He notably sits with leg tucked under himself. No swelling or tenderness. Pain spans small area of lateral calf.  - stay seated with both legs on floor  - tylenol, ibuprofen as needed  - f/u in 2 weeks       Orders Placed This Encounter    sulfacetamide (BLEPH-10) 10 % ophthalmic solution     Sig: Place 1 drop into both eyes 4 times daily as needed    amLODIPine (NORVASC) 5 MG tablet     Sig: Take 1 tablet by mouth daily     Dispense:  30 tablet     Refill:  0        No follow-ups on file.

## 2024-06-19 ENCOUNTER — OFFICE VISIT (OUTPATIENT)
Dept: INTERNAL MEDICINE CLINIC | Age: 74
End: 2024-06-19
Payer: COMMERCIAL

## 2024-06-19 VITALS — SYSTOLIC BLOOD PRESSURE: 142 MMHG | DIASTOLIC BLOOD PRESSURE: 82 MMHG | WEIGHT: 209 LBS | BODY MASS INDEX: 31.78 KG/M2

## 2024-06-19 DIAGNOSIS — I10 ESSENTIAL HYPERTENSION: ICD-10-CM

## 2024-06-19 DIAGNOSIS — G89.29 CHRONIC LEFT-SIDED LOW BACK PAIN WITH LEFT-SIDED SCIATICA: ICD-10-CM

## 2024-06-19 DIAGNOSIS — M54.42 CHRONIC LEFT-SIDED LOW BACK PAIN WITH LEFT-SIDED SCIATICA: ICD-10-CM

## 2024-06-19 DIAGNOSIS — E03.9 ACQUIRED HYPOTHYROIDISM: ICD-10-CM

## 2024-06-19 DIAGNOSIS — E03.9 ACQUIRED HYPOTHYROIDISM: Primary | ICD-10-CM

## 2024-06-19 LAB — TSH SERPL DL<=0.005 MIU/L-ACNC: 1.87 UIU/ML (ref 0.27–4.2)

## 2024-06-19 PROCEDURE — 1123F ACP DISCUSS/DSCN MKR DOCD: CPT | Performed by: HOSPITALIST

## 2024-06-19 PROCEDURE — 3077F SYST BP >= 140 MM HG: CPT | Performed by: HOSPITALIST

## 2024-06-19 PROCEDURE — 3079F DIAST BP 80-89 MM HG: CPT | Performed by: HOSPITALIST

## 2024-06-19 PROCEDURE — 99214 OFFICE O/P EST MOD 30 MIN: CPT | Performed by: HOSPITALIST

## 2024-06-19 NOTE — PROGRESS NOTES
Follow Up Visit Established Patient Visit    Patient:  Atilio Oconnor                                               : 1950  Age: 74 y.o.  MRN: [id number]  Date : 2024    Atilio Oconnor is a 74 y.o. male who presents for : Follow-up appointment    Chief Complaint   Patient presents with    Hypertension   Checks his BP twice/ day; SBP ranges 114-137 mm Hg.  No CP/SOB; no nausea/ vomiting.  No lightheadedness.  Reports episodes of left-sided lower back pain with radiation down to his left lateral calf area.  No abdominal pain.  No dysuria.  Does not adhere to low-sodium diet.    Past Medical History:   Diagnosis Date    Cancer (HCC)     skin cancer    Hyperlipidemia     Hypertension 2024    Inguinal hernia bilateral, non-recurrent 2020    Kidney stone 2020    Scalp lesion 2017    squamous cell Ca    Sinus congestion     Thyroid disease     Umbilical hernia without obstruction and without gangrene 2020       Past Surgical History:   Procedure Laterality Date    COLONOSCOPY  2005    COLONOSCOPY  2018    small polyps 5-10 years adenomatousvs hyperplastic    CYSTOSCOPY Right 2020    CYSTOSCOPY, RIGHT RETROGRADE PYLOGRAM, RIGHT URETEROSCOPY, LASER, STONE MANIPULATION AND EXTRACTION AND RIGHT STENT INSERTION performed by Mina Gregory MD at [facility] OR    GLOSSECTOMY Left 2021    PARTIAL RESECTION OF LEFT ORAL TONGUE performed by Joe Forbes MD at [facility] OR    MOHS SURGERY  2016    Dr Love, scalp lesion , squamous cell cancer     MOUTH SURGERY Left 2021    EXCISE LESION LEFT ORAL TONGUE performed by Joe Forbes MD at [facility] OR    MOUTH SURGERY Left 2023    EXCISIONAL BIOPSY ANTERIOR THIRD OF LEFT SIDE OF TONGUE performed by Gasper Cavazos MD at [facility] OR    SINUS SURGERY      TONSILLECTOMY      TOTAL HIP ARTHROPLASTY Right        Current Outpatient Medications   Medication Sig Dispense Refill    sulfacetamide (BLEPH-10) 10 %

## 2024-06-24 DIAGNOSIS — I10 HYPERTENSION, UNSPECIFIED TYPE: ICD-10-CM

## 2024-06-24 RX ORDER — AMLODIPINE BESYLATE 5 MG/1
5 TABLET ORAL DAILY
Qty: 30 TABLET | Refills: 3 | Status: SHIPPED | OUTPATIENT
Start: 2024-06-24

## 2024-06-24 NOTE — TELEPHONE ENCOUNTER
REFILL    amLODIPine (NORVASC) 5 MG tablet     [facility]/pharmacy #[id number] - [place], [place] - [address] - P [phone] - F [phone]

## 2024-07-10 ENCOUNTER — OFFICE VISIT (OUTPATIENT)
Dept: ENT CLINIC | Age: 74
End: 2024-07-10
Payer: COMMERCIAL

## 2024-07-10 VITALS
DIASTOLIC BLOOD PRESSURE: 84 MMHG | TEMPERATURE: 98.6 F | SYSTOLIC BLOOD PRESSURE: 142 MMHG | HEIGHT: 68 IN | HEART RATE: 90 BPM | BODY MASS INDEX: 31.55 KG/M2 | WEIGHT: 208.2 LBS

## 2024-07-10 DIAGNOSIS — Z85.810 H/O TONGUE CANCER: Primary | ICD-10-CM

## 2024-07-10 DIAGNOSIS — Z12.81: ICD-10-CM

## 2024-07-10 DIAGNOSIS — J34.89 NASAL VESTIBULITIS: ICD-10-CM

## 2024-07-10 PROCEDURE — 3077F SYST BP >= 140 MM HG: CPT | Performed by: OTOLARYNGOLOGY

## 2024-07-10 PROCEDURE — 1123F ACP DISCUSS/DSCN MKR DOCD: CPT | Performed by: OTOLARYNGOLOGY

## 2024-07-10 PROCEDURE — 99212 OFFICE O/P EST SF 10 MIN: CPT | Performed by: OTOLARYNGOLOGY

## 2024-07-10 PROCEDURE — 3079F DIAST BP 80-89 MM HG: CPT | Performed by: OTOLARYNGOLOGY

## 2024-07-10 NOTE — PROGRESS NOTES
Lab Studies:  Lab Results   Component Value Date    WBC 7.7 11/30/2022    HGB 14.4 11/30/2022    HCT 42.7 11/30/2022    MCV 98.8 11/30/2022     11/30/2022     Lab Results   Component Value Date    GLUCOSE 85 11/30/2022    BUN 17 11/30/2022    CREATININE 0.7 (L) 11/30/2022    K 4.2 11/30/2022     11/30/2022     11/30/2022    CALCIUM 9.2 11/30/2022     Lab Results   Component Value Date    MG 2.20 07/14/2015     No results found for: PHOS  Lab Results   Component Value Date    ALKPHOS 91 11/30/2022    ALT 23 11/30/2022    AST 25 11/30/2022    BILITOT 0.5 11/30/2022    PROT 6.7 11/30/2022        Review of Systems   Constitutional:  Negative for activity change, appetite change, chills, fatigue and fever.   HENT:  Negative for congestion, ear discharge, ear pain, facial swelling, hearing loss, nosebleeds, postnasal drip, rhinorrhea, sinus pressure, sinus pain, sneezing, sore throat, tinnitus, trouble swallowing and voice change.    Eyes:  Negative for pain, redness, itching and visual disturbance.   Respiratory:  Negative for cough, choking and shortness of breath.    Gastrointestinal:  Negative for diarrhea and nausea.   Endocrine: Negative for cold intolerance and heat intolerance.     Objective:   Physical Exam  Constitutional:       General: He is not in acute distress.     Appearance: He is well-developed.   HENT:      Head: Not macrocephalic and not microcephalic. No abrasion or contusion.      Mouth/Throat:      Lips: No lesions.      Mouth: No oral lesions.      Dentition: Normal dentition.      Tongue: No lesions.      Palate: No mass.      Pharynx: No oropharyngeal exudate, posterior oropharyngeal erythema or uvula swelling.      Tonsils: No tonsillar abscesses.   Neck:      Thyroid: No thyroid mass or thyromegaly.      Trachea: No tracheal tenderness or tracheal deviation.   Cardiovascular:      Rate and Rhythm: Normal rate and regular rhythm.   Pulmonary:      Breath sounds: No

## 2024-07-26 ENCOUNTER — OFFICE VISIT (OUTPATIENT)
Dept: INTERNAL MEDICINE CLINIC | Age: 74
End: 2024-07-26
Payer: COMMERCIAL

## 2024-07-26 VITALS
BODY MASS INDEX: 31.84 KG/M2 | WEIGHT: 209.4 LBS | DIASTOLIC BLOOD PRESSURE: 90 MMHG | SYSTOLIC BLOOD PRESSURE: 154 MMHG | TEMPERATURE: 97.5 F | OXYGEN SATURATION: 97 % | HEART RATE: 86 BPM

## 2024-07-26 DIAGNOSIS — E03.9 ACQUIRED HYPOTHYROIDISM: ICD-10-CM

## 2024-07-26 DIAGNOSIS — G89.29 CHRONIC LEFT-SIDED LOW BACK PAIN WITH LEFT-SIDED SCIATICA: ICD-10-CM

## 2024-07-26 DIAGNOSIS — E78.5 DYSLIPIDEMIA: ICD-10-CM

## 2024-07-26 DIAGNOSIS — M54.42 CHRONIC LEFT-SIDED LOW BACK PAIN WITH LEFT-SIDED SCIATICA: ICD-10-CM

## 2024-07-26 DIAGNOSIS — I10 ESSENTIAL HYPERTENSION: Primary | ICD-10-CM

## 2024-07-26 PROCEDURE — 3080F DIAST BP >= 90 MM HG: CPT | Performed by: HOSPITALIST

## 2024-07-26 PROCEDURE — 99214 OFFICE O/P EST MOD 30 MIN: CPT | Performed by: HOSPITALIST

## 2024-07-26 PROCEDURE — 1123F ACP DISCUSS/DSCN MKR DOCD: CPT | Performed by: HOSPITALIST

## 2024-07-26 PROCEDURE — 3077F SYST BP >= 140 MM HG: CPT | Performed by: HOSPITALIST

## 2024-07-26 NOTE — PROGRESS NOTES
Follow Up Visit Established Patient Visit    Patient:  Atilio Oconnor                                               : 1950  Age: 74 y.o.  MRN: [id number]  Date : 2024    Atilio Oconnor is a 74 y.o. male who presents for :  follow up appointment    Chief Complaint   Patient presents with    Leg Pain     Follow-up on left leg pain,better      Doing well overall.   Checks his BP at home twice daily. SBP ranges 118-130 mm Hg.  No CP/SOB; no heart palpitations.  TSH on 24 was 1.87  + intermittent  left leg cramps and left mid-lateral shin discomfort. Shin pain usually occurs when he stands up after prolonged sitting.  Remains active; takes care of his ADLs. Exercises regularly.  Doesn't adhere to carb controlled diet; admits to overeating.    Past Medical History:   Diagnosis Date    Cancer (HCC)     skin cancer    Hyperlipidemia     Hypertension 2024    Inguinal hernia bilateral, non-recurrent 2020    Kidney stone 2020    Scalp lesion 2017    squamous cell Ca    Sinus congestion     Thyroid disease     Umbilical hernia without obstruction and without gangrene 2020       Past Surgical History:   Procedure Laterality Date    COLONOSCOPY  2005    COLONOSCOPY  2018    small polyps 5-10 years adenomatousvs hyperplastic    CYSTOSCOPY Right 2020    CYSTOSCOPY, RIGHT RETROGRADE PYLOGRAM, RIGHT URETEROSCOPY, LASER, STONE MANIPULATION AND EXTRACTION AND RIGHT STENT INSERTION performed by Mina Gregory MD at [facility] OR    GLOSSECTOMY Left 2021    PARTIAL RESECTION OF LEFT ORAL TONGUE performed by oJe Forbes MD at [facility] OR    MOHS SURGERY  2016    Dr Love, scalp lesion , squamous cell cancer     MOUTH SURGERY Left 2021    EXCISE LESION LEFT ORAL TONGUE performed by Joe Forbes MD at [facility] OR    MOUTH SURGERY Left 2023    EXCISIONAL BIOPSY ANTERIOR THIRD OF LEFT SIDE OF TONGUE performed by Gasper Cavazos MD at [facility] OR

## 2024-08-29 ENCOUNTER — OFFICE VISIT (OUTPATIENT)
Dept: ENT CLINIC | Age: 74
End: 2024-08-29
Payer: COMMERCIAL

## 2024-08-29 VITALS — DIASTOLIC BLOOD PRESSURE: 86 MMHG | HEART RATE: 76 BPM | SYSTOLIC BLOOD PRESSURE: 137 MMHG | TEMPERATURE: 97.7 F

## 2024-08-29 DIAGNOSIS — Z85.810 H/O TONGUE CANCER: Primary | ICD-10-CM

## 2024-08-29 DIAGNOSIS — E03.9 ACQUIRED HYPOTHYROIDISM: ICD-10-CM

## 2024-08-29 PROCEDURE — 1123F ACP DISCUSS/DSCN MKR DOCD: CPT | Performed by: OTOLARYNGOLOGY

## 2024-08-29 PROCEDURE — 99212 OFFICE O/P EST SF 10 MIN: CPT | Performed by: OTOLARYNGOLOGY

## 2024-08-29 PROCEDURE — 3075F SYST BP GE 130 - 139MM HG: CPT | Performed by: OTOLARYNGOLOGY

## 2024-08-29 PROCEDURE — 3079F DIAST BP 80-89 MM HG: CPT | Performed by: OTOLARYNGOLOGY

## 2024-08-29 RX ORDER — LEVOTHYROXINE SODIUM 75 UG/1
75 TABLET ORAL NIGHTLY
Qty: 90 TABLET | Refills: 1 | Status: SHIPPED | OUTPATIENT
Start: 2024-08-29

## 2024-08-29 NOTE — PROGRESS NOTES
Subjective:      Patient ID: Atilio Oconnor is a 73 y.o. male.    HPI  Chief Complaint   Patient presents with    surveillance       History of Present Illness  Head/Neck    Atilio is a(n) 73 y.o. male who presents with a 6 week follow up tongue cancer. Last surgery 3-2023. Doing well. Denies any issue with his tongue.   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)     Past Surgical History:   Procedure Laterality Date    COLONOSCOPY  04/04/2005    COLONOSCOPY  01/05/2018    small polyps 5-10 years adenomatousvs hyperplastic    CYSTOSCOPY Right 05/14/2020    CYSTOSCOPY, RIGHT RETROGRADE PYLOGRAM, RIGHT URETEROSCOPY, LASER, STONE MANIPULATION AND EXTRACTION AND RIGHT STENT INSERTION performed by Mina Gregory MD at [facility] OR    GLOSSECTOMY Left 9/30/2021    PARTIAL RESECTION OF LEFT ORAL TONGUE performed by Joe Forbes MD at [facility] OR    MOHS SURGERY  2016    Dr Love, scalp lesion , squamous cell cancer     MOUTH SURGERY Left 3/19/2021    EXCISE LESION LEFT ORAL TONGUE performed by Joe Forbes MD at [facility] OR    MOUTH SURGERY Left 3/31/2023    EXCISIONAL BIOPSY ANTERIOR THIRD OF LEFT SIDE OF TONGUE performed by Gasper Cavazos MD at [facility] OR    SINUS SURGERY      TONSILLECTOMY       Family History   Problem Relation Age of Onset    No Known  Results   Component Value Date    WBC 7.7 11/30/2022    HGB 14.4 11/30/2022    HCT 42.7 11/30/2022    MCV 98.8 11/30/2022     11/30/2022     Lab Results   Component Value Date    GLUCOSE 85 11/30/2022    BUN 17 11/30/2022    CREATININE 0.7 (L) 11/30/2022    K 4.2 11/30/2022     11/30/2022     11/30/2022    CALCIUM 9.2 11/30/2022     Lab Results   Component Value Date    MG 2.20 07/14/2015     No results found for: PHOS  Lab Results   Component Value Date    ALKPHOS 91 11/30/2022    ALT 23 11/30/2022    AST 25 11/30/2022    BILITOT 0.5 11/30/2022    PROT 6.7 11/30/2022        Review of Systems   Constitutional:  Negative for activity change, appetite change, chills, fatigue and fever.   HENT:  Negative for congestion, ear discharge, ear pain, facial swelling, hearing loss, nosebleeds, postnasal drip, rhinorrhea, sinus pressure, sinus pain, sneezing, sore throat, tinnitus, trouble swallowing and voice change.    Eyes:  Negative for pain, redness, itching and visual disturbance.   Respiratory:  Negative for cough, choking and shortness of breath.    Gastrointestinal:  Negative for diarrhea and nausea.   Endocrine: Negative for cold intolerance and heat intolerance.     Objective:   Physical Exam  Constitutional:       General: He is not in acute distress.     Appearance: He is well-developed.   HENT:      Head: Not macrocephalic and not microcephalic. No abrasion or contusion.      Mouth/Throat:      Lips: No lesions.      Mouth: No oral lesions.      Dentition: Normal dentition.      Tongue: No lesions.      Palate: No mass.      Pharynx: No oropharyngeal exudate, posterior oropharyngeal erythema or uvula swelling.      Tonsils: No tonsillar abscesses.   Neck:      Thyroid: No thyroid mass or thyromegaly.      Trachea: No tracheal tenderness or tracheal deviation.   Cardiovascular:      Rate and Rhythm: Normal rate and regular rhythm.   Pulmonary:      Breath sounds: No stridor.   Musculoskeletal:

## 2024-09-22 DIAGNOSIS — I10 HYPERTENSION, UNSPECIFIED TYPE: ICD-10-CM

## 2024-09-23 RX ORDER — AMLODIPINE BESYLATE 5 MG/1
5 TABLET ORAL DAILY
Qty: 90 TABLET | Refills: 0 | Status: SHIPPED | OUTPATIENT
Start: 2024-09-23

## 2024-10-10 ENCOUNTER — OFFICE VISIT (OUTPATIENT)
Dept: ENT CLINIC | Age: 74
End: 2024-10-10

## 2024-10-10 VITALS
RESPIRATION RATE: 16 BRPM | DIASTOLIC BLOOD PRESSURE: 83 MMHG | SYSTOLIC BLOOD PRESSURE: 133 MMHG | TEMPERATURE: 97.7 F | HEIGHT: 68 IN | WEIGHT: 209 LBS | HEART RATE: 78 BPM | BODY MASS INDEX: 31.67 KG/M2

## 2024-10-10 DIAGNOSIS — Z85.810 H/O TONGUE CANCER: Primary | ICD-10-CM

## 2024-10-10 NOTE — PROGRESS NOTES
Subjective:      Patient ID: Atilio Oconnor is a 73 y.o. male.    HPI  Chief Complaint   Patient presents with    surveillance       History of Present Illness  Head/Neck    Atilio is a(n) 73 y.o. male who presents with a 6 week follow up tongue cancer. Last surgery 3-2023. Doing well. Denies any issue with his tongue.   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)     Past Surgical History:   Procedure Laterality Date    COLONOSCOPY  04/04/2005    COLONOSCOPY  01/05/2018    small polyps 5-10 years adenomatousvs hyperplastic    CYSTOSCOPY Right 05/14/2020    CYSTOSCOPY, RIGHT RETROGRADE PYLOGRAM, RIGHT URETEROSCOPY, LASER, STONE MANIPULATION AND EXTRACTION AND RIGHT STENT INSERTION performed by Mina Gregory MD at [facility] OR    GLOSSECTOMY Left 9/30/2021    PARTIAL RESECTION OF LEFT ORAL TONGUE performed by Joe Forbes MD at [facility] OR    MOHS SURGERY  2016    Dr Love, scalp lesion , squamous cell cancer     MOUTH SURGERY Left 3/19/2021    EXCISE LESION LEFT ORAL TONGUE performed by Joe Forbes MD at [facility] OR    MOUTH SURGERY Left 3/31/2023    EXCISIONAL BIOPSY ANTERIOR THIRD OF LEFT SIDE OF TONGUE performed by Gasper Cavazos MD at [facility] OR    SINUS SURGERY      TONSILLECTOMY       Family History   Problem Relation Age of Onset    No Known

## 2024-11-21 ENCOUNTER — OFFICE VISIT (OUTPATIENT)
Dept: ENT CLINIC | Age: 74
End: 2024-11-21

## 2024-11-21 VITALS
HEIGHT: 68 IN | WEIGHT: 214.8 LBS | HEART RATE: 88 BPM | SYSTOLIC BLOOD PRESSURE: 138 MMHG | DIASTOLIC BLOOD PRESSURE: 92 MMHG | TEMPERATURE: 98.2 F | BODY MASS INDEX: 32.55 KG/M2

## 2024-11-21 DIAGNOSIS — R09.81 NASAL CONGESTION: ICD-10-CM

## 2024-11-21 DIAGNOSIS — J34.3 NASAL TURBINATE HYPERTROPHY: ICD-10-CM

## 2024-11-21 DIAGNOSIS — Z85.810 H/O TONGUE CANCER: Primary | ICD-10-CM

## 2024-11-21 RX ORDER — AZELASTINE 1 MG/ML
2 SPRAY, METERED NASAL 2 TIMES DAILY
Qty: 30 ML | Refills: 5 | Status: SHIPPED | OUTPATIENT
Start: 2024-11-21 | End: 2025-11-21

## 2024-11-21 NOTE — PROGRESS NOTES
Subjective:      Patient ID: Atilio Oconnor is a 73 y.o. male.    HPI  Chief Complaint   Patient presents with    surveillance       History of Present Illness  Head/Neck    Atilio is a(n) 73 y.o. male who presents with a 6 week follow up tongue cancer. Last surgery 3-2023. Doing well. Denies any issue with his tongue.   Otalgia: No  Odynophagia: No  Dysphagia: No  Voice Changes: No  Lumps in neck: No    Also complains of nasal obstruction. Alternates. At night is worse. Ok during day.   Uses benadryl at night. Helps. Has not used sprays.           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 OR    GLOSSECTOMY Left 9/30/2021    PARTIAL RESECTION OF LEFT ORAL TONGUE performed by Joe Forbes MD at [facility] OR    MOHS SURGERY  2016    Dr Love, scalp lesion , squamous cell cancer     MOUTH SURGERY Left 3/19/2021    EXCISE LESION LEFT ORAL TONGUE performed by Joe Forbes MD at [facility] OR    MOUTH SURGERY Left 3/31/2023    EXCISIONAL BIOPSY ANTERIOR THIRD OF LEFT SIDE OF TONGUE performed by

## 2024-12-18 DIAGNOSIS — I10 HYPERTENSION, UNSPECIFIED TYPE: ICD-10-CM

## 2024-12-18 RX ORDER — AMLODIPINE BESYLATE 5 MG/1
5 TABLET ORAL DAILY
Qty: 90 TABLET | Refills: 0 | Status: SHIPPED | OUTPATIENT
Start: 2024-12-18

## 2024-12-27 ENCOUNTER — OFFICE VISIT (OUTPATIENT)
Dept: INTERNAL MEDICINE CLINIC | Age: 74
End: 2024-12-27
Payer: MEDICARE

## 2024-12-27 VITALS
HEIGHT: 68 IN | DIASTOLIC BLOOD PRESSURE: 78 MMHG | BODY MASS INDEX: 32.89 KG/M2 | SYSTOLIC BLOOD PRESSURE: 138 MMHG | WEIGHT: 217 LBS

## 2024-12-27 DIAGNOSIS — I10 ESSENTIAL HYPERTENSION: ICD-10-CM

## 2024-12-27 DIAGNOSIS — Z12.5 PROSTATE CANCER SCREENING: ICD-10-CM

## 2024-12-27 DIAGNOSIS — M35.00 SICCA SYNDROME (HCC): ICD-10-CM

## 2024-12-27 DIAGNOSIS — R01.1 HEART MURMUR: ICD-10-CM

## 2024-12-27 DIAGNOSIS — R06.02 SHORTNESS OF BREATH: ICD-10-CM

## 2024-12-27 DIAGNOSIS — E03.9 ACQUIRED HYPOTHYROIDISM: ICD-10-CM

## 2024-12-27 DIAGNOSIS — E78.5 DYSLIPIDEMIA: ICD-10-CM

## 2024-12-27 DIAGNOSIS — Z00.00 MEDICARE ANNUAL WELLNESS VISIT, SUBSEQUENT: Primary | ICD-10-CM

## 2024-12-27 LAB
ALBUMIN SERPL-MCNC: 4.8 G/DL (ref 3.4–5)
ALBUMIN/GLOB SERPL: 2.2 {RATIO} (ref 1.1–2.2)
ALP SERPL-CCNC: 96 U/L (ref 40–129)
ALT SERPL-CCNC: 35 U/L (ref 10–40)
ANION GAP SERPL CALCULATED.3IONS-SCNC: 12 MMOL/L (ref 3–16)
AST SERPL-CCNC: 28 U/L (ref 15–37)
BASOPHILS # BLD: 0.1 K/UL (ref 0–0.2)
BASOPHILS NFR BLD: 0.7 %
BILIRUB SERPL-MCNC: 0.5 MG/DL (ref 0–1)
BUN SERPL-MCNC: 23 MG/DL (ref 7–20)
CALCIUM SERPL-MCNC: 9.5 MG/DL (ref 8.3–10.6)
CHLORIDE SERPL-SCNC: 104 MMOL/L (ref 99–110)
CHOLEST SERPL-MCNC: 156 MG/DL (ref 0–199)
CO2 SERPL-SCNC: 25 MMOL/L (ref 21–32)
CREAT SERPL-MCNC: 1.1 MG/DL (ref 0.8–1.3)
DEPRECATED RDW RBC AUTO: 14.4 % (ref 12.4–15.4)
EOSINOPHIL # BLD: 0.1 K/UL (ref 0–0.6)
EOSINOPHIL NFR BLD: 0.7 %
GFR SERPLBLD CREATININE-BSD FMLA CKD-EPI: 70 ML/MIN/{1.73_M2}
GLUCOSE SERPL-MCNC: 121 MG/DL (ref 70–99)
HCT VFR BLD AUTO: 43.5 % (ref 40.5–52.5)
HDLC SERPL-MCNC: 42 MG/DL (ref 40–60)
HGB BLD-MCNC: 14.8 G/DL (ref 13.5–17.5)
LDLC SERPL CALC-MCNC: 74 MG/DL
LYMPHOCYTES # BLD: 2.1 K/UL (ref 1–5.1)
LYMPHOCYTES NFR BLD: 29.6 %
MCH RBC QN AUTO: 33.8 PG (ref 26–34)
MCHC RBC AUTO-ENTMCNC: 34 G/DL (ref 31–36)
MCV RBC AUTO: 99.7 FL (ref 80–100)
MONOCYTES # BLD: 0.7 K/UL (ref 0–1.3)
MONOCYTES NFR BLD: 9.6 %
NEUTROPHILS # BLD: 4.2 K/UL (ref 1.7–7.7)
NEUTROPHILS NFR BLD: 59.4 %
PLATELET # BLD AUTO: 164 K/UL (ref 135–450)
PMV BLD AUTO: 8.9 FL (ref 5–10.5)
POTASSIUM SERPL-SCNC: 4.4 MMOL/L (ref 3.5–5.1)
PROT SERPL-MCNC: 7 G/DL (ref 6.4–8.2)
PSA SERPL DL<=0.01 NG/ML-MCNC: 3.25 NG/ML (ref 0–4)
RBC # BLD AUTO: 4.36 M/UL (ref 4.2–5.9)
SODIUM SERPL-SCNC: 141 MMOL/L (ref 136–145)
TRIGL SERPL-MCNC: 198 MG/DL (ref 0–150)
TSH SERPL DL<=0.005 MIU/L-ACNC: 2.81 UIU/ML (ref 0.27–4.2)
VLDLC SERPL CALC-MCNC: 40 MG/DL
WBC # BLD AUTO: 7.1 K/UL (ref 4–11)

## 2024-12-27 PROCEDURE — G0439 PPPS, SUBSEQ VISIT: HCPCS | Performed by: HOSPITALIST

## 2024-12-27 PROCEDURE — 3075F SYST BP GE 130 - 139MM HG: CPT | Performed by: HOSPITALIST

## 2024-12-27 PROCEDURE — 3078F DIAST BP <80 MM HG: CPT | Performed by: HOSPITALIST

## 2024-12-27 PROCEDURE — 3017F COLORECTAL CA SCREEN DOC REV: CPT | Performed by: HOSPITALIST

## 2024-12-27 PROCEDURE — 1159F MED LIST DOCD IN RCRD: CPT | Performed by: HOSPITALIST

## 2024-12-27 PROCEDURE — 1123F ACP DISCUSS/DSCN MKR DOCD: CPT | Performed by: HOSPITALIST

## 2024-12-27 PROCEDURE — G8484 FLU IMMUNIZE NO ADMIN: HCPCS | Performed by: HOSPITALIST

## 2024-12-27 ASSESSMENT — PATIENT HEALTH QUESTIONNAIRE - PHQ9
2. FEELING DOWN, DEPRESSED OR HOPELESS: NOT AT ALL
SUM OF ALL RESPONSES TO PHQ QUESTIONS 1-9: 0
SUM OF ALL RESPONSES TO PHQ9 QUESTIONS 1 & 2: 0
1. LITTLE INTEREST OR PLEASURE IN DOING THINGS: NOT AT ALL
SUM OF ALL RESPONSES TO PHQ QUESTIONS 1-9: 0

## 2024-12-27 ASSESSMENT — LIFESTYLE VARIABLES
HOW OFTEN DO YOU HAVE A DRINK CONTAINING ALCOHOL: NEVER
HOW MANY STANDARD DRINKS CONTAINING ALCOHOL DO YOU HAVE ON A TYPICAL DAY: PATIENT DOES NOT DRINK

## 2024-12-27 NOTE — PATIENT INSTRUCTIONS
realistic goals. Many people expect to lose much more weight than is likely. A weight loss of 5% to 10% of your body weight may be enough to improve your health.  Get family and friends involved to provide support. Talk to them about why you are trying to lose weight, and ask them to help. They can help by participating in exercise and having meals with you, even if they may be eating something different.  Find what works best for you. If you do not have time or do not like to cook, a program that offers meal replacement bars or shakes may be better for you. Or if you like to prepare meals, finding a plan that includes daily menus and recipes may be best.  Ask your doctor about other health professionals who can help you achieve your weight loss goals.  A dietitian can help you make healthy changes in your diet.  An exercise specialist or  can help you develop a safe and effective exercise program.  A counselor or psychiatrist can help you cope with issues such as depression, anxiety, or family problems that can make it hard to focus on weight loss.  Consider joining a support group for people who are trying to lose weight. Your doctor can suggest groups in your area.  Where can you learn more?  Go to https://www.[employer].net/patientEd and enter U357 to learn more about \"Starting a Weight Loss Plan: Care Instructions.\"  Current as of: September 20, 2023  Content Version: 14.2  © 2024 [employer]   Care instructions adapted under license by [employer]. If you have questions about a medical condition or this instruction, always ask your healthcare professional. [employer] disclaims any warranty or liability for your use of this information.           A Healthy Heart: Care Instructions  Overview     Coronary artery disease, also called heart disease, occurs when a substance called plaque builds up in the vessels that supply oxygen-rich blood to your heart muscle. This can

## 2024-12-27 NOTE — PROGRESS NOTES
Patient education provided.  not contributory. REVIEW OF SYSTEMS:  All pertinent positive and negative findings included in HPI. Otherwise, all other systems are reviewed and are negative    PHYSICAL EXAMINATION:   GENERAL: wdwn- no acute distress  RESPIRATORY: No stridor. COMMUNICATION :  Normal voice  MENTAL STATUS: Alert and oriented x3  HEAD AND FACE: No skin lesions of the face. EXTERNAL EARS AND NOSE: The external ears and nasal pyramid are normal.  FACIAL MUSCLES: All branches of the facial nerve intact. EXTRAOCULAR MUSCLES: Intact with full range of motion. LIPS, TEETH, GINGIVA:  Normal mucosa  PHARYNX: The left side of the oral tongue is completely healed. There is no visible leukoplakia. On palpation there is no induration. NECK:  No masses. IMPRESSION: Solved carcinoma of the tongue. PLAN: Continue surveillance every 3 months. FOLLOW-UP: 3 months.

## 2024-12-27 NOTE — PROGRESS NOTES
Medicare Annual Wellness Visit    Atilio Oconnor is here for Medicare AWV    Assessment & Plan   Medicare annual wellness visit, subsequent       -     Strongly encouraged the patient to adhere to reduced calorie carb controlled diet and increase regular exercise activity       -      Encouraged the patient to receive vaccination for tetanus and RSV    Essential hypertension  -     TSH; Future  -     CBC with Auto Differential; Future  -     Comprehensive Metabolic Panel; Future  -     Strongly encouraged to adhere to low-sodium diet and continue amlodipine 5 mg daily  -     Encouraged to lose 10-15 pound    Acquired hypothyroidism  -     TSH; Future  -      Continue levothyroxine 75 mcg nightly; will adjust dose as necessary    Heart murmur  -     Echo (TTE) limited (PRN contrast/bubble/strain/3D); Future    Shortness of breath  -     Echo (TTE) limited (PRN contrast/bubble/strain/3D); Future    Sicca syndrome (HCC)         -     Supportive care; keep scheduled follow-up ophthalmology appointments with Dr. Santamaria    Dyslipidemia  -     Lipid Panel; Future  -     TSH; Future  -      Strongly encouraged the patient to adhere to low-fat/low-cholesterol diet and try to lose 10-15 pounds    Prostate cancer screening  -     PSA Screening; Future    Recommendations for Preventive Services Due: see orders and patient instructions/AVS.  Recommended screening schedule for the next 5-10 years is provided to the patient in written form: see Patient Instructions/AVS.     Return in about 6 months (around 6/27/2025), or if symptoms worsen or fail to improve, for HTN, dyslipidemia, acquired hypothyroidism.     Subjective   Reports chronic R sided lower back pain.  Follows up with Dr Dacosta for history of R KEVIN.  Checks his BP daily; SBP range 118-145 mm Hg.  Exercise 3-4 times/ week ( rides his bike)  + some nasal congestion; uses Sudafed as needed.  Has dental exam and teeth cleaning twice/year      Patient's complete Health

## 2025-01-08 ENCOUNTER — OFFICE VISIT (OUTPATIENT)
Dept: ENT CLINIC | Age: 75
End: 2025-01-08
Payer: MEDICARE

## 2025-01-08 VITALS — DIASTOLIC BLOOD PRESSURE: 76 MMHG | HEART RATE: 95 BPM | TEMPERATURE: 98.4 F | SYSTOLIC BLOOD PRESSURE: 150 MMHG

## 2025-01-08 DIAGNOSIS — J34.3 NASAL TURBINATE HYPERTROPHY: ICD-10-CM

## 2025-01-08 DIAGNOSIS — R09.81 NASAL CONGESTION: ICD-10-CM

## 2025-01-08 DIAGNOSIS — H60.8X3 CHRONIC ECZEMATOUS OTITIS EXTERNA OF BOTH EARS: ICD-10-CM

## 2025-01-08 DIAGNOSIS — Z85.810 H/O TONGUE CANCER: Primary | ICD-10-CM

## 2025-01-08 PROCEDURE — 3077F SYST BP >= 140 MM HG: CPT | Performed by: OTOLARYNGOLOGY

## 2025-01-08 PROCEDURE — 1123F ACP DISCUSS/DSCN MKR DOCD: CPT | Performed by: OTOLARYNGOLOGY

## 2025-01-08 PROCEDURE — G8427 DOCREV CUR MEDS BY ELIG CLIN: HCPCS | Performed by: OTOLARYNGOLOGY

## 2025-01-08 PROCEDURE — 99212 OFFICE O/P EST SF 10 MIN: CPT | Performed by: OTOLARYNGOLOGY

## 2025-01-08 PROCEDURE — 1159F MED LIST DOCD IN RCRD: CPT | Performed by: OTOLARYNGOLOGY

## 2025-01-08 PROCEDURE — M1300 PR FLU IMM NO ADMIN DOC REA: HCPCS | Performed by: OTOLARYNGOLOGY

## 2025-01-08 PROCEDURE — 1036F TOBACCO NON-USER: CPT | Performed by: OTOLARYNGOLOGY

## 2025-01-08 PROCEDURE — G8417 CALC BMI ABV UP PARAM F/U: HCPCS | Performed by: OTOLARYNGOLOGY

## 2025-01-08 PROCEDURE — 3017F COLORECTAL CA SCREEN DOC REV: CPT | Performed by: OTOLARYNGOLOGY

## 2025-01-08 PROCEDURE — 1126F AMNT PAIN NOTED NONE PRSNT: CPT | Performed by: OTOLARYNGOLOGY

## 2025-01-08 PROCEDURE — 3078F DIAST BP <80 MM HG: CPT | Performed by: OTOLARYNGOLOGY

## 2025-01-08 NOTE — PROGRESS NOTES
capsule by mouth 2 times daily 7/21/23  Yes Historical Provider, MD   levothyroxine (SYNTHROID) 50 MCG tablet TAKE 1 TABLET BY MOUTH EVERY DAY AT NIGHT 1/3/23  Yes Kobe Joy MD   vitamin D 1000 UNITS CAPS Take 2 capsules by mouth nightly   Yes Historical Provider, MD       Lab Studies:  Lab Results   Component Value Date    WBC 7.7 11/30/2022    HGB 14.4 11/30/2022    HCT 42.7 11/30/2022    MCV 98.8 11/30/2022     11/30/2022     Lab Results   Component Value Date    GLUCOSE 85 11/30/2022    BUN 17 11/30/2022    CREATININE 0.7 (L) 11/30/2022    K 4.2 11/30/2022     11/30/2022     11/30/2022    CALCIUM 9.2 11/30/2022     Lab Results   Component Value Date    MG 2.20 07/14/2015     No results found for: PHOS  Lab Results   Component Value Date    ALKPHOS 91 11/30/2022    ALT 23 11/30/2022    AST 25 11/30/2022    BILITOT 0.5 11/30/2022    PROT 6.7 11/30/2022        Review of Systems   Constitutional:  Negative for activity change, appetite change, chills, fatigue and fever.   HENT:  Negative for congestion, ear discharge, ear pain, facial swelling, hearing loss, nosebleeds, postnasal drip, rhinorrhea, sinus pressure, sinus pain, sneezing, sore throat, tinnitus, trouble swallowing and voice change.    Eyes:  Negative for pain, redness, itching and visual disturbance.   Respiratory:  Negative for cough, choking and shortness of breath.    Gastrointestinal:  Negative for diarrhea and nausea.   Endocrine: Negative for cold intolerance and heat intolerance.     Objective:   Physical Exam  Constitutional:       General: He is not in acute distress.     Appearance: He is well-developed.   HENT:      Head: Not macrocephalic and not microcephalic. No abrasion or contusion.      Mouth/Throat:      Lips: No lesions.      Mouth: No oral lesions.      Dentition: Normal dentition.      Tongue: No lesions.      Palate: No mass.      Pharynx: No oropharyngeal exudate, posterior oropharyngeal erythema or uvula

## 2025-02-19 DIAGNOSIS — E03.9 ACQUIRED HYPOTHYROIDISM: ICD-10-CM

## 2025-02-19 RX ORDER — LEVOTHYROXINE SODIUM 75 UG/1
75 TABLET ORAL NIGHTLY
Qty: 90 TABLET | Refills: 1 | Status: SHIPPED | OUTPATIENT
Start: 2025-02-19

## 2025-02-24 ENCOUNTER — OFFICE VISIT (OUTPATIENT)
Dept: ENT CLINIC | Age: 75
End: 2025-02-24
Payer: MEDICARE

## 2025-02-24 VITALS — TEMPERATURE: 97.8 F | SYSTOLIC BLOOD PRESSURE: 144 MMHG | HEART RATE: 76 BPM | DIASTOLIC BLOOD PRESSURE: 82 MMHG

## 2025-02-24 DIAGNOSIS — H60.8X3 CHRONIC ECZEMATOUS OTITIS EXTERNA OF BOTH EARS: ICD-10-CM

## 2025-02-24 DIAGNOSIS — R09.81 NASAL CONGESTION: ICD-10-CM

## 2025-02-24 DIAGNOSIS — Z85.810 H/O TONGUE CANCER: Primary | ICD-10-CM

## 2025-02-24 PROCEDURE — 3079F DIAST BP 80-89 MM HG: CPT | Performed by: OTOLARYNGOLOGY

## 2025-02-24 PROCEDURE — 1036F TOBACCO NON-USER: CPT | Performed by: OTOLARYNGOLOGY

## 2025-02-24 PROCEDURE — G8427 DOCREV CUR MEDS BY ELIG CLIN: HCPCS | Performed by: OTOLARYNGOLOGY

## 2025-02-24 PROCEDURE — 1126F AMNT PAIN NOTED NONE PRSNT: CPT | Performed by: OTOLARYNGOLOGY

## 2025-02-24 PROCEDURE — 1159F MED LIST DOCD IN RCRD: CPT | Performed by: OTOLARYNGOLOGY

## 2025-02-24 PROCEDURE — G8417 CALC BMI ABV UP PARAM F/U: HCPCS | Performed by: OTOLARYNGOLOGY

## 2025-02-24 PROCEDURE — 99212 OFFICE O/P EST SF 10 MIN: CPT | Performed by: OTOLARYNGOLOGY

## 2025-02-24 PROCEDURE — 1123F ACP DISCUSS/DSCN MKR DOCD: CPT | Performed by: OTOLARYNGOLOGY

## 2025-02-24 PROCEDURE — 3017F COLORECTAL CA SCREEN DOC REV: CPT | Performed by: OTOLARYNGOLOGY

## 2025-02-24 PROCEDURE — 3077F SYST BP >= 140 MM HG: CPT | Performed by: OTOLARYNGOLOGY

## 2025-02-24 NOTE — PROGRESS NOTES
Subjective:      Patient ID: Atilio Oconnor is a 73 y.o. male.    HPI  Chief Complaint   Patient presents with    surveillance       History of Present Illness  Head/Neck    Atilio is a(n) 73 y.o. male who presents with a 6 week follow up tongue cancer. Last surgery 3-2023. Doing well. Denies any issue with his tongue.   Otalgia: No  Odynophagia: No  Dysphagia: No  Voice Changes: No  Lumps in neck: No    Also complains of nasal obstruction. Alternates. At night is worse. Ok during day.   Uses benadryl at night. Helps. Has not used sprays.   Patient here for 6 week follow up. No new concerns. Sufa drops working great for itchy ears.       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)     Past Surgical History:   Procedure Laterality Date    COLONOSCOPY  04/04/2005    COLONOSCOPY  01/05/2018    small polyps 5-10 years adenomatousvs hyperplastic    CYSTOSCOPY Right 05/14/2020    CYSTOSCOPY, RIGHT RETROGRADE PYLOGRAM, RIGHT URETEROSCOPY, LASER, STONE MANIPULATION AND EXTRACTION AND RIGHT STENT INSERTION performed by Mina Gregory MD at [facility] OR    GLOSSECTOMY Left 9/30/2021    PARTIAL RESECTION OF LEFT ORAL TONGUE performed by Joe Forbes MD at [facility] OR    MOHS SURGERY  2016    Dr Love, scalp lesion , squamous cell cancer     MOUTH SURGERY Left 3/19/2021    EXCISE LESION LEFT ORAL TONGUE performed by Joe Forbes MD at [facility] OR    MOUTH

## 2025-03-19 DIAGNOSIS — I10 HYPERTENSION, UNSPECIFIED TYPE: ICD-10-CM

## 2025-03-19 RX ORDER — AMLODIPINE BESYLATE 5 MG/1
5 TABLET ORAL DAILY
Qty: 90 TABLET | Refills: 1 | Status: SHIPPED | OUTPATIENT
Start: 2025-03-19

## 2025-04-07 ENCOUNTER — OFFICE VISIT (OUTPATIENT)
Dept: ENT CLINIC | Age: 75
End: 2025-04-07
Payer: MEDICARE

## 2025-04-07 VITALS — SYSTOLIC BLOOD PRESSURE: 131 MMHG | HEART RATE: 77 BPM | TEMPERATURE: 97.5 F | DIASTOLIC BLOOD PRESSURE: 83 MMHG

## 2025-04-07 DIAGNOSIS — Z85.810 H/O TONGUE CANCER: ICD-10-CM

## 2025-04-07 DIAGNOSIS — K13.21 LEUKOPLAKIA OF ORAL CAVITY: Primary | ICD-10-CM

## 2025-04-07 PROCEDURE — G8427 DOCREV CUR MEDS BY ELIG CLIN: HCPCS | Performed by: OTOLARYNGOLOGY

## 2025-04-07 PROCEDURE — 1126F AMNT PAIN NOTED NONE PRSNT: CPT | Performed by: OTOLARYNGOLOGY

## 2025-04-07 PROCEDURE — G8417 CALC BMI ABV UP PARAM F/U: HCPCS | Performed by: OTOLARYNGOLOGY

## 2025-04-07 PROCEDURE — 1123F ACP DISCUSS/DSCN MKR DOCD: CPT | Performed by: OTOLARYNGOLOGY

## 2025-04-07 PROCEDURE — 1036F TOBACCO NON-USER: CPT | Performed by: OTOLARYNGOLOGY

## 2025-04-07 PROCEDURE — 3075F SYST BP GE 130 - 139MM HG: CPT | Performed by: OTOLARYNGOLOGY

## 2025-04-07 PROCEDURE — 3017F COLORECTAL CA SCREEN DOC REV: CPT | Performed by: OTOLARYNGOLOGY

## 2025-04-07 PROCEDURE — 99212 OFFICE O/P EST SF 10 MIN: CPT | Performed by: OTOLARYNGOLOGY

## 2025-04-07 PROCEDURE — 1159F MED LIST DOCD IN RCRD: CPT | Performed by: OTOLARYNGOLOGY

## 2025-04-07 PROCEDURE — 3079F DIAST BP 80-89 MM HG: CPT | Performed by: OTOLARYNGOLOGY

## 2025-04-07 NOTE — PROGRESS NOTES
Subjective:      Patient ID: Atilio Oconnor is a 73 y.o. male.    HPI  Chief Complaint   Patient presents with    surveillance       History of Present Illness  Head/Neck    Atilio is a(n) 73 y.o. male who presents with a 6 week follow up tongue cancer. Last surgery 3-2023. Doing well. Denies any issue with his tongue.   Otalgia: No  Odynophagia: No  Dysphagia: No  Voice Changes: No  Lumps in neck: No      Patient here for 6 week follow up. Concern for spot under left anterior tongue. 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)     Past Surgical History:   Procedure Laterality Date    COLONOSCOPY  04/04/2005    COLONOSCOPY  01/05/2018    small polyps 5-10 years adenomatousvs hyperplastic    CYSTOSCOPY Right 05/14/2020    CYSTOSCOPY, RIGHT RETROGRADE PYLOGRAM, RIGHT URETEROSCOPY, LASER, STONE MANIPULATION AND EXTRACTION AND RIGHT STENT INSERTION performed by Mina Gregory MD at [facility] OR    GLOSSECTOMY Left 9/30/2021    PARTIAL RESECTION OF LEFT ORAL TONGUE performed by Joe Forbes MD at [facility] OR    MOHS SURGERY  2016    Dr Love, scalp lesion , squamous cell cancer     MOUTH SURGERY Left 3/19/2021    EXCISE LESION LEFT ORAL TONGUE performed by Joe Forbes MD at [facility] OR    MOUTH SURGERY Left 3/31/2023    EXCISIONAL BIOPSY ANTERIOR THIRD OF LEFT SIDE OF TONGUE performed by Gasper Cavazos MD at [facility] OR

## 2025-05-05 ENCOUNTER — OFFICE VISIT (OUTPATIENT)
Dept: ENT CLINIC | Age: 75
End: 2025-05-05
Payer: MEDICARE

## 2025-05-05 VITALS — HEART RATE: 77 BPM | TEMPERATURE: 97.7 F | DIASTOLIC BLOOD PRESSURE: 81 MMHG | SYSTOLIC BLOOD PRESSURE: 131 MMHG

## 2025-05-05 DIAGNOSIS — K13.21 LEUKOPLAKIA OF ORAL CAVITY: Primary | ICD-10-CM

## 2025-05-05 DIAGNOSIS — Z85.810 H/O TONGUE CANCER: ICD-10-CM

## 2025-05-05 PROCEDURE — 99212 OFFICE O/P EST SF 10 MIN: CPT | Performed by: OTOLARYNGOLOGY

## 2025-05-05 PROCEDURE — G8417 CALC BMI ABV UP PARAM F/U: HCPCS | Performed by: OTOLARYNGOLOGY

## 2025-05-05 PROCEDURE — 3074F SYST BP LT 130 MM HG: CPT | Performed by: OTOLARYNGOLOGY

## 2025-05-05 PROCEDURE — 3078F DIAST BP <80 MM HG: CPT | Performed by: OTOLARYNGOLOGY

## 2025-05-05 PROCEDURE — 1159F MED LIST DOCD IN RCRD: CPT | Performed by: OTOLARYNGOLOGY

## 2025-05-05 PROCEDURE — G8427 DOCREV CUR MEDS BY ELIG CLIN: HCPCS | Performed by: OTOLARYNGOLOGY

## 2025-05-05 PROCEDURE — 1123F ACP DISCUSS/DSCN MKR DOCD: CPT | Performed by: OTOLARYNGOLOGY

## 2025-05-05 PROCEDURE — 1036F TOBACCO NON-USER: CPT | Performed by: OTOLARYNGOLOGY

## 2025-05-05 PROCEDURE — 1126F AMNT PAIN NOTED NONE PRSNT: CPT | Performed by: OTOLARYNGOLOGY

## 2025-05-05 PROCEDURE — 3017F COLORECTAL CA SCREEN DOC REV: CPT | Performed by: OTOLARYNGOLOGY

## 2025-05-05 RX ORDER — SULFACETAMIDE SODIUM 100 MG/ML
1 SOLUTION/ DROPS OPHTHALMIC 2 TIMES DAILY
COMMUNITY
Start: 2025-03-03

## 2025-06-23 ENCOUNTER — OFFICE VISIT (OUTPATIENT)
Dept: ENT CLINIC | Age: 75
End: 2025-06-23
Payer: MEDICARE

## 2025-06-23 VITALS — HEART RATE: 83 BPM | DIASTOLIC BLOOD PRESSURE: 80 MMHG | TEMPERATURE: 97.5 F | SYSTOLIC BLOOD PRESSURE: 147 MMHG

## 2025-06-23 DIAGNOSIS — K13.21 LEUKOPLAKIA OF ORAL CAVITY: Primary | ICD-10-CM

## 2025-06-23 PROCEDURE — 3079F DIAST BP 80-89 MM HG: CPT | Performed by: OTOLARYNGOLOGY

## 2025-06-23 PROCEDURE — 3077F SYST BP >= 140 MM HG: CPT | Performed by: OTOLARYNGOLOGY

## 2025-06-23 PROCEDURE — 1159F MED LIST DOCD IN RCRD: CPT | Performed by: OTOLARYNGOLOGY

## 2025-06-23 PROCEDURE — G8427 DOCREV CUR MEDS BY ELIG CLIN: HCPCS | Performed by: OTOLARYNGOLOGY

## 2025-06-23 PROCEDURE — 1036F TOBACCO NON-USER: CPT | Performed by: OTOLARYNGOLOGY

## 2025-06-23 PROCEDURE — 99212 OFFICE O/P EST SF 10 MIN: CPT | Performed by: OTOLARYNGOLOGY

## 2025-06-23 PROCEDURE — 1126F AMNT PAIN NOTED NONE PRSNT: CPT | Performed by: OTOLARYNGOLOGY

## 2025-06-23 PROCEDURE — G8417 CALC BMI ABV UP PARAM F/U: HCPCS | Performed by: OTOLARYNGOLOGY

## 2025-06-23 PROCEDURE — 1123F ACP DISCUSS/DSCN MKR DOCD: CPT | Performed by: OTOLARYNGOLOGY

## 2025-06-23 NOTE — PROGRESS NOTES
Subjective:      Patient ID: Atilio Oconnor is a 73 y.o. male.    HPI  Chief Complaint   Patient presents with    surveillance       History of Present Illness  Head/Neck    Atilio is a(n) 73 y.o. male who presents with a 6 week follow up tongue cancer. Last surgery 3-2023. Doing well. Denies any issue with his tongue.   Otalgia: No  Odynophagia: No  Dysphagia: No  Voice Changes: No  Lumps in neck: No      Patient here for 4 week follow up tongue lesion. States no change or pain.. 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)     Past Surgical History:   Procedure Laterality Date    COLONOSCOPY  04/04/2005    COLONOSCOPY  01/05/2018    small polyps 5-10 years adenomatousvs hyperplastic    CYSTOSCOPY Right 05/14/2020    CYSTOSCOPY, RIGHT RETROGRADE PYLOGRAM, RIGHT URETEROSCOPY, LASER, STONE MANIPULATION AND EXTRACTION AND RIGHT STENT INSERTION performed by Mina Gregory MD at [facility] OR    GLOSSECTOMY Left 9/30/2021    PARTIAL RESECTION OF LEFT ORAL TONGUE performed by Joe Forbes MD at [facility] OR    MOHS SURGERY  2016    Dr Love, scalp lesion , squamous cell cancer     MOUTH SURGERY Left 3/19/2021    EXCISE LESION LEFT ORAL TONGUE performed by Joe Forbes MD at [facility] OR    MOUTH SURGERY Left 3/31/2023    EXCISIONAL BIOPSY ANTERIOR THIRD OF LEFT SIDE OF TONGUE performed by Gasper Cavazos MD at [facility] OR    SINUS

## 2025-07-21 ENCOUNTER — OFFICE VISIT (OUTPATIENT)
Dept: ENT CLINIC | Age: 75
End: 2025-07-21
Payer: MEDICARE

## 2025-07-21 VITALS — SYSTOLIC BLOOD PRESSURE: 154 MMHG | DIASTOLIC BLOOD PRESSURE: 83 MMHG | TEMPERATURE: 96.9 F | HEART RATE: 82 BPM

## 2025-07-21 DIAGNOSIS — J34.89 NASAL VESTIBULITIS: ICD-10-CM

## 2025-07-21 DIAGNOSIS — Z85.810 H/O TONGUE CANCER: ICD-10-CM

## 2025-07-21 DIAGNOSIS — K13.21 LEUKOPLAKIA OF ORAL CAVITY: Primary | ICD-10-CM

## 2025-07-21 PROCEDURE — G8427 DOCREV CUR MEDS BY ELIG CLIN: HCPCS | Performed by: OTOLARYNGOLOGY

## 2025-07-21 PROCEDURE — 1036F TOBACCO NON-USER: CPT | Performed by: OTOLARYNGOLOGY

## 2025-07-21 PROCEDURE — 1159F MED LIST DOCD IN RCRD: CPT | Performed by: OTOLARYNGOLOGY

## 2025-07-21 PROCEDURE — 1123F ACP DISCUSS/DSCN MKR DOCD: CPT | Performed by: OTOLARYNGOLOGY

## 2025-07-21 PROCEDURE — 99214 OFFICE O/P EST MOD 30 MIN: CPT | Performed by: OTOLARYNGOLOGY

## 2025-07-21 PROCEDURE — 1126F AMNT PAIN NOTED NONE PRSNT: CPT | Performed by: OTOLARYNGOLOGY

## 2025-07-21 PROCEDURE — 3078F DIAST BP <80 MM HG: CPT | Performed by: OTOLARYNGOLOGY

## 2025-07-21 PROCEDURE — G8417 CALC BMI ABV UP PARAM F/U: HCPCS | Performed by: OTOLARYNGOLOGY

## 2025-07-21 PROCEDURE — 3077F SYST BP >= 140 MM HG: CPT | Performed by: OTOLARYNGOLOGY

## 2025-07-21 RX ORDER — MUPIROCIN 2 %
OINTMENT (GRAM) TOPICAL
Qty: 1 EACH | Refills: 1 | Status: SHIPPED | OUTPATIENT
Start: 2025-07-21 | End: 2025-07-28

## 2025-07-21 NOTE — PROGRESS NOTES
Subjective:      Patient ID: Atilio Oconnor is a 73 y.o. male.    HPI  Chief Complaint   Patient presents with    surveillance       History of Present Illness  Head/Neck    Atilio is a(n) 73 y.o. male who presents with a 6 week follow up tongue cancer. Last surgery 3-2023. Doing well. Denies any issue with his tongue.   Otalgia: No  Odynophagia: No  Dysphagia: No  Voice Changes: No  Lumps in neck: No      Patient here for 4 week follow up tongue lesion. States no change or pain.. No other complaints. {Atient very anxious and does not want further apart appointments.     Also with sore inside of nose. Has had vestibulitis. Responds to mupirocin.     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)     Past Surgical History:   Procedure Laterality Date    COLONOSCOPY  04/04/2005    COLONOSCOPY  01/05/2018    small polyps 5-10 years adenomatousvs hyperplastic    CYSTOSCOPY Right 05/14/2020    CYSTOSCOPY, RIGHT RETROGRADE PYLOGRAM, RIGHT URETEROSCOPY, LASER, STONE MANIPULATION AND EXTRACTION AND RIGHT STENT INSERTION performed by Mina Gregory MD at [facility] OR    GLOSSECTOMY Left 9/30/2021    PARTIAL RESECTION OF LEFT ORAL TONGUE performed by Joe Forbes MD at [facility] OR    MOHS SURGERY  2016    Dr Love, scalp lesion , squamous cell cancer     MOUTH SURGERY Left 3/19/2021    EXCISE LESION LEFT ORAL TONGUE performed by Joe Forbes MD at [facility] OR

## 2025-08-26 ENCOUNTER — OFFICE VISIT (OUTPATIENT)
Dept: ENT CLINIC | Age: 75
End: 2025-08-26
Payer: MEDICARE

## 2025-08-26 VITALS — SYSTOLIC BLOOD PRESSURE: 129 MMHG | DIASTOLIC BLOOD PRESSURE: 79 MMHG | TEMPERATURE: 97.5 F | HEART RATE: 76 BPM

## 2025-08-26 DIAGNOSIS — Z85.810 H/O TONGUE CANCER: Primary | ICD-10-CM

## 2025-08-26 PROCEDURE — 1036F TOBACCO NON-USER: CPT | Performed by: OTOLARYNGOLOGY

## 2025-08-26 PROCEDURE — 99212 OFFICE O/P EST SF 10 MIN: CPT | Performed by: OTOLARYNGOLOGY

## 2025-08-26 PROCEDURE — 3074F SYST BP LT 130 MM HG: CPT | Performed by: OTOLARYNGOLOGY

## 2025-08-26 PROCEDURE — 1123F ACP DISCUSS/DSCN MKR DOCD: CPT | Performed by: OTOLARYNGOLOGY

## 2025-08-26 PROCEDURE — 1159F MED LIST DOCD IN RCRD: CPT | Performed by: OTOLARYNGOLOGY

## 2025-08-26 PROCEDURE — 1126F AMNT PAIN NOTED NONE PRSNT: CPT | Performed by: OTOLARYNGOLOGY

## 2025-08-26 PROCEDURE — 3078F DIAST BP <80 MM HG: CPT | Performed by: OTOLARYNGOLOGY

## 2025-08-26 PROCEDURE — G8417 CALC BMI ABV UP PARAM F/U: HCPCS | Performed by: OTOLARYNGOLOGY

## 2025-08-26 PROCEDURE — G8427 DOCREV CUR MEDS BY ELIG CLIN: HCPCS | Performed by: OTOLARYNGOLOGY

## (undated) DEVICE — E-Z CLEAN, NON-STICK, PTFE COATED, ELECTROSURGICAL NEEDLE ELECTRODE, MODIFIED EXTENDED INSULATION, 2.75 INCH (7 CM): Brand: MEGADYNE

## (undated) DEVICE — GARMENT,MEDLINE,DVT,INT,CALF,MED, GEN2: Brand: MEDLINE

## (undated) DEVICE — TOWEL,OR,DSP,ST,BLUE,DLX,8/PK,10PK/CS: Brand: MEDLINE

## (undated) DEVICE — GAUZE,SPONGE,4"X4",16PLY,XRAY,STRL,LF: Brand: MEDLINE

## (undated) DEVICE — GLOVE ORANGE PI 7   MSG9070

## (undated) DEVICE — SOLUTION IV 1000ML 0.9% SOD CHL

## (undated) DEVICE — STANDARD HYPODERMIC NEEDLE,POLYPROPYLENE HUB: Brand: MONOJECT

## (undated) DEVICE — OPEN-END FLEXI-TIP URETERAL CATHETER: Brand: FLEXI-TIP

## (undated) DEVICE — FIBER LASER EXCALIBUR HOLM

## (undated) DEVICE — SET IRRIG L94IN ID0.281IN W/ 4.5IN DST FLX CONN 2 LD ON OFF

## (undated) DEVICE — COVER LT HNDL BLU PLAS

## (undated) DEVICE — TUBING SUCT 10FR MAL ALUM SHFT FN CAP VENT UNIV CONN W/ OBT

## (undated) DEVICE — GLOVE ORANGE PI 7 1/2   MSG9075

## (undated) DEVICE — GOWN,SIRUS,POLYRNF,BRTHSLV,XL,30/CS: Brand: MEDLINE

## (undated) DEVICE — COVER,MAYO STAND,XL,STERILE: Brand: MEDLINE

## (undated) DEVICE — Device

## (undated) DEVICE — COAGULATOR SUCT 10FR LAIN FTSWCH ACTIVATION DISP VALLEYLAB

## (undated) DEVICE — INTENDED FOR TISSUE SEPARATION, AND OTHER PROCEDURES THAT REQUIRE A SHARP SURGICAL BLADE TO PUNCTURE OR CUT.: Brand: BARD-PARKER ® CARBON RIB-BACK BLADES

## (undated) DEVICE — PENCIL ES ULT VAC W TELSCP NOSE EZ CLN BLDE 10FT

## (undated) DEVICE — PACK,CYSTOSCOPY,PK III,AURORA: Brand: MEDLINE

## (undated) DEVICE — PREP TRAY 10X5X2: Brand: MEDLINE INDUSTRIES, INC.

## (undated) DEVICE — SUTURE ETHLN SZ 3-0 L18IN NONABSORBABLE BLK L24MM FS-1 3/8 663G

## (undated) DEVICE — SUTURE PERMAHAND SZ 2-0 L30IN NONABSORBABLE BLK SILK W/O A305H

## (undated) DEVICE — TUBING, SUCTION, 1/4" X 12', STRAIGHT: Brand: MEDLINE

## (undated) DEVICE — SUTURE VCRL SZ 3-0 L27IN ABSRB UD L26MM SH 1/2 CIR J416H

## (undated) DEVICE — SOLUTION ST WATER 1500ML W/H

## (undated) DEVICE — DRAPE,INSTRUMENT,MAGNETIC,10X16: Brand: MEDLINE

## (undated) DEVICE — CONTAINER,SPECIMEN,PNEU TUBE,3OZ,OR STRL: Brand: MEDLINE

## (undated) DEVICE — GUIDEWIRE ENDOSCP L150CM DIA0.035IN TIP 3CM PTFE NIT

## (undated) DEVICE — NITINOL STONE RETRIEVAL BASKET: Brand: ZERO TIP

## (undated) DEVICE — SYRINGE MED 10ML TRNSLUC BRL PLUNG BLK MRK POLYPR CTRL

## (undated) DEVICE — Z INACTIVE USE 2660664 SOLUTION IRRIG 3000ML 0.9% SOD CHL USP UROMATIC PLAS CONT

## (undated) DEVICE — COAGULATOR SUCT TEF FT DISP STRL 6IN ULTRACLEAN

## (undated) DEVICE — ELECTROSURGICAL PENCIL ROCKER SWITCH NON COATED BLADE ELECTRODE 10 FT (3 M) CORD HOLSTER: Brand: MEGADYNE

## (undated) DEVICE — Z DISCONTINUED BY MEDLINE USE 2711682 TRAY SKIN PREP DRY W/ PREM GLV

## (undated) DEVICE — MARKER,SKIN,WI/RULER AND LABELS: Brand: MEDLINE

## (undated) DEVICE — JEWISH HOSPITAL TURNOVER KIT: Brand: MEDLINE INDUSTRIES, INC.

## (undated) DEVICE — TOWEL,STOP FLAG GOLD N-W: Brand: MEDLINE

## (undated) DEVICE — NASAL FESS: Brand: MEDLINE INDUSTRIES, INC.

## (undated) DEVICE — SOLUTION IV IRRIG WATER 1000ML POUR BRL 2F7114

## (undated) DEVICE — MEDICINE CUP, GRADUATED, STER: Brand: MEDLINE

## (undated) DEVICE — ELECTRODE PT RET AD L9FT HI MOIST COND ADH HYDRGEL CORDED

## (undated) DEVICE — PAD,NON-ADHERENT,3X8,STERILE,LF,1/PK: Brand: MEDLINE

## (undated) DEVICE — SURGICAL SET UP - SURE SET: Brand: MEDLINE INDUSTRIES, INC.

## (undated) DEVICE — GLOVE SURG BEAD CUF 7 STD PF WHT STRL TRIUMPH LT LTX

## (undated) DEVICE — SURE SET-DOUBLE BASIN-LF: Brand: MEDLINE INDUSTRIES, INC.

## (undated) DEVICE — PLATE ES AD W 9FT CRD 2

## (undated) DEVICE — PACK,EENT,TURBAN DRAPE,PK II: Brand: MEDLINE

## (undated) DEVICE — SUTURE VCRL SZ 2-0 L27IN ABSRB VLT L26MM CT-2 1/2 CIR J333H

## (undated) DEVICE — SYRINGE, LUER LOCK, 10ML: Brand: MEDLINE